# Patient Record
Sex: FEMALE | Race: ASIAN | NOT HISPANIC OR LATINO | Employment: FULL TIME | URBAN - METROPOLITAN AREA
[De-identification: names, ages, dates, MRNs, and addresses within clinical notes are randomized per-mention and may not be internally consistent; named-entity substitution may affect disease eponyms.]

---

## 2017-04-13 ENCOUNTER — HOSPITAL ENCOUNTER (OUTPATIENT)
Facility: MEDICAL CENTER | Age: 57
End: 2017-04-13
Attending: FAMILY MEDICINE
Payer: COMMERCIAL

## 2017-04-13 ENCOUNTER — HOSPITAL ENCOUNTER (OUTPATIENT)
Dept: LAB | Facility: MEDICAL CENTER | Age: 57
End: 2017-04-13
Payer: COMMERCIAL

## 2017-04-13 DIAGNOSIS — R73.01 IMPAIRED FASTING BLOOD SUGAR: ICD-10-CM

## 2017-04-13 DIAGNOSIS — I10 ESSENTIAL HYPERTENSION: ICD-10-CM

## 2017-04-13 DIAGNOSIS — D47.Z2 CASTLEMAN DISEASE (HCC): ICD-10-CM

## 2017-04-13 DIAGNOSIS — E78.5 DYSLIPIDEMIA: ICD-10-CM

## 2017-04-13 LAB
ALBUMIN SERPL BCP-MCNC: 4.9 G/DL (ref 3.2–4.9)
ALBUMIN/GLOB SERPL: 1.2 G/DL
ALP SERPL-CCNC: 139 U/L (ref 30–99)
ALT SERPL-CCNC: 22 U/L (ref 2–50)
ANION GAP SERPL CALC-SCNC: 6 MMOL/L (ref 0–11.9)
AST SERPL-CCNC: 22 U/L (ref 12–45)
BDY FAT % MEASURED: 31.6 %
BILIRUB SERPL-MCNC: 0.6 MG/DL (ref 0.1–1.5)
BP DIAS: 74 MMHG
BP SYS: 124 MMHG
BUN SERPL-MCNC: 11 MG/DL (ref 8–22)
CALCIUM SERPL-MCNC: 10.1 MG/DL (ref 8.5–10.5)
CHLORIDE SERPL-SCNC: 103 MMOL/L (ref 96–112)
CHOLEST SERPL-MCNC: 153 MG/DL (ref 100–199)
CHOLEST SERPL-MCNC: 154 MG/DL (ref 100–199)
CO2 SERPL-SCNC: 30 MMOL/L (ref 20–33)
CREAT SERPL-MCNC: 0.58 MG/DL (ref 0.5–1.4)
DIABETES HTDIA: NO
EST. AVERAGE GLUCOSE BLD GHB EST-MCNC: 117 MG/DL
EVENT NAME HTEVT: NORMAL
FASTING HTFAS: YES
GFR SERPL CREATININE-BSD FRML MDRD: >60 ML/MIN/1.73 M 2
GLOBULIN SER CALC-MCNC: 4 G/DL (ref 1.9–3.5)
GLUCOSE SERPL-MCNC: 105 MG/DL (ref 65–99)
GLUCOSE SERPL-MCNC: 105 MG/DL (ref 65–99)
HBA1C MFR BLD: 5.7 % (ref 0–5.6)
HDLC SERPL-MCNC: 55 MG/DL
HDLC SERPL-MCNC: 55 MG/DL
HYPERTENSION HTHYP: NO
LDLC SERPL CALC-MCNC: 72 MG/DL
LDLC SERPL CALC-MCNC: 73 MG/DL
POTASSIUM SERPL-SCNC: 4.5 MMOL/L (ref 3.6–5.5)
PROT SERPL-MCNC: 8.9 G/DL (ref 6–8.2)
SCREENING LOC CITY HTCIT: NORMAL
SCREENING LOC STATE HTSTA: NORMAL
SCREENING LOCATION HTLOC: NORMAL
SMOKING HTSMO: NO
SODIUM SERPL-SCNC: 139 MMOL/L (ref 135–145)
SUBSCRIBER ID HTSID: NORMAL
TRIGL SERPL-MCNC: 128 MG/DL (ref 0–149)
TRIGL SERPL-MCNC: 129 MG/DL (ref 0–149)

## 2017-04-13 PROCEDURE — 80061 LIPID PANEL: CPT | Mod: 91

## 2017-04-13 PROCEDURE — 80053 COMPREHEN METABOLIC PANEL: CPT

## 2017-04-13 PROCEDURE — 83036 HEMOGLOBIN GLYCOSYLATED A1C: CPT

## 2017-04-13 PROCEDURE — 80061 LIPID PANEL: CPT

## 2017-04-13 PROCEDURE — S5190 WELLNESS ASSESSMENT BY NONPH: HCPCS

## 2017-04-13 PROCEDURE — 36415 COLL VENOUS BLD VENIPUNCTURE: CPT

## 2017-04-13 PROCEDURE — 82947 ASSAY GLUCOSE BLOOD QUANT: CPT

## 2017-04-20 ENCOUNTER — OFFICE VISIT (OUTPATIENT)
Dept: MEDICAL GROUP | Facility: PHYSICIAN GROUP | Age: 57
End: 2017-04-20
Payer: COMMERCIAL

## 2017-04-20 VITALS
SYSTOLIC BLOOD PRESSURE: 132 MMHG | TEMPERATURE: 98.2 F | OXYGEN SATURATION: 97 % | DIASTOLIC BLOOD PRESSURE: 78 MMHG | HEART RATE: 73 BPM | WEIGHT: 117 LBS | HEIGHT: 61 IN | BODY MASS INDEX: 22.09 KG/M2 | RESPIRATION RATE: 14 BRPM

## 2017-04-20 DIAGNOSIS — D47.Z2 CASTLEMAN DISEASE (HCC): ICD-10-CM

## 2017-04-20 DIAGNOSIS — R73.01 IMPAIRED FASTING BLOOD SUGAR: ICD-10-CM

## 2017-04-20 DIAGNOSIS — I10 ESSENTIAL HYPERTENSION: ICD-10-CM

## 2017-04-20 DIAGNOSIS — E78.5 DYSLIPIDEMIA: ICD-10-CM

## 2017-04-20 PROCEDURE — 99214 OFFICE O/P EST MOD 30 MIN: CPT | Performed by: FAMILY MEDICINE

## 2017-04-20 RX ORDER — SIMVASTATIN 20 MG
TABLET ORAL
Qty: 90 TAB | Refills: 1 | Status: SHIPPED | OUTPATIENT
Start: 2017-04-20 | End: 2018-01-02 | Stop reason: SDUPTHER

## 2017-04-20 ASSESSMENT — PATIENT HEALTH QUESTIONNAIRE - PHQ9: CLINICAL INTERPRETATION OF PHQ2 SCORE: 0

## 2017-04-20 NOTE — MR AVS SNAPSHOT
"        Martha Apodacamaren   2017 1:00 PM   Office Visit   MRN: [id number]    Department:  [facility]   Dept Phone:  [phone]    Description:  Female : 1960   Provider:  Carol Juarez M.D.           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   [facility] (--)    [address]  [address]  [facility] [id number] [phone]           You will be receiving a confirmation call a few days before your appointment from our automated call confirmation system.              Problem List              ICD-10-CM Priority Class Noted - Resolved    HTN (hypertension) I10   Unknown - Present    Dyslipidemia E78.5   Unknown - Present    Impaired fasting blood sugar R73.01   2014 - Present    Dysarthria R47.1   Unknown - Present    Enlarged lymph nodes R59.9   2014 - Present    Castleman disease D47.Z2   2015 - Present    Essential hypertension I10   10/21/2015 - Present    Ataxia R27.0   10/21/2015 - Present      Health Maintenance        Date Due Completion Dates    MAMMOGRAM 2017, 2015, 2014, 2009, 2009, 2008, 2008    PAP SMEAR 2017    IMM DTaP/Tdap/Td Vaccine (2 - Td) 2024    COLONOSCOPY " 9/25/2024 9/25/2014            Current Immunizations     Hepatitis B Vaccine Non-Recombivax (Ped/Adol) 1/28/2000, 8/28/1999, 7/28/1999    Influenza TIV (IM) 4/22/2014  2:10 PM    Influenza Vaccine Quad Inj (Pf) 10/3/2016 10:10 AM, 10/5/2015 11:11 AM, 10/23/2014    Influenza Vaccine Quad Inj (Preserved) 10/3/2016, 10/5/2015    Tdap Vaccine 7/17/2014    Tuberculin Skin Test 4/30/2014 10:20 AM, 4/22/2014  2:10 PM      Below and/or attached are the medications your provider expects you to take. Review all of your home medications and newly ordered medications with your provider and/or pharmacist. Follow medication instructions as directed by your provider and/or pharmacist. Please keep your medication list with you and share with your provider. Update the information when medications are discontinued, doses are changed, or new medications (including over-the-counter products) are added; and carry medication information at all times in the event of emergency situations     Allergies:  No Known Allergies          Medications  Valid as of: April 20, 2017 -  1:19 PM    Generic Name Brand Name Tablet Size Instructions for use    Ascorbic Acid (Tab) ascorbic acid 500 MG Take 500 mg by mouth every day.        Aspirin (Chew Tab) ASA 81 MG Take 81 mg by mouth every day.        Cholecalciferol (Tab) cholecalciferol 1000 UNIT Take 2,000 Units by mouth every day.        NIFEdipine (TABLET SR 24 HR) ADALAT CC 60 MG TAKE ONE TABLET BY MOUTH ONCE DAILY        Omega-3 Fatty Acids (Cap) fish oil 1000 MG Take 1,000 mg by mouth 3 times a day, with meals.        Simvastatin (Tab) ZOCOR 20 MG TAKE ONE TABLET BY MOUTH ONCE DAILY IN THE EVENING        .                 Medicines prescribed today were sent to:     [facility] [address] [place] [address] [address]    Phone: [phone] Fax: [phone]    Open 24 Hours?: No      Medication refill instructions:       If your prescription  bottle indicates you have medication refills left, it is not necessary to call your provider’s office. Please contact your pharmacy and they will refill your medication.    If your prescription bottle indicates you do not have any refills left, you may request refills at any time through one of the following ways: The online [employer] system (except Urgent Care), by calling your provider’s office, or by asking your pharmacy to contact your provider’s office with a refill request. Medication refills are processed only during regular business hours and may not be available until the next business day. Your provider may request additional information or to have a follow-up visit with you prior to refilling your medication.   *Please Note: Medication refills are assigned a new Rx number when refilled electronically. Your pharmacy may indicate that no refills were authorized even though a new prescription for the same medication is available at the pharmacy. Please request the medicine by name with the pharmacy before contacting your provider for a refill.        Your To Do List     Future Labs/Procedures Complete By Expires    CBC WITH DIFFERENTIAL  As directed 4/21/2018    COMP METABOLIC PANEL  As directed 4/21/2018    LIPID PROFILE  As directed 4/21/2018         [employer] Access Code: [email or web address]  Expires: 5/20/2017 12:41 PM    [employer]  A secure, online tool to manage your health information     [employer]’s Mobile [employer]® is a secure, online tool that connects you to your personalized health information from the privacy of your home -- day or night - making it very easy for you to manage your healthcare. Once the activation process is completed, you can even access your medical information using the [employer]se justine, which is available for free in the Apple Justine store or Google Play store.     [employer] provides the following levels of access (as shown below):   My Chart Features   [facility] Primary Care Doctor  [facility]  Specialists [facility]  Urgent  Care Non-[facility]  Primary Care  Doctor   Email your healthcare team securely and privately 24/7 X X X    Manage appointments: schedule your next appointment; view details of past/upcoming appointments X      Request prescription refills. X      View recent personal medical records, including lab and immunizations X X X X   View health record, including health history, allergies, medications X X X X   Read reports about your outpatient visits, procedures, consult and ER notes X X X X   See your discharge summary, which is a recap of your hospital and/or ER visit that includes your diagnosis, lab results, and care plan. X X       How to register for [employer]  1. Go to  [email or web address].  2. Click on the Sign Up Now box, which takes you to the New Member Sign Up page. You will need to provide the following information:  a. Enter your [employer] Access Code exactly as it appears at the top of this page. (You will not need to use this code after you’ve completed the sign-up process. If you do not sign up before the expiration date, you must request a new code.)   b. Enter your date of birth.   c. Enter your home email address.   d. Click Submit, and follow the next screen’s instructions.  3. Create a [employer] ID. This will be your [employer] login ID and cannot be changed, so think of one that is secure and easy to remember.  4. Create a [employer] password. You can change your password at any time.  5. Enter your Password Reset Question and Answer. This can be used at a later time if you forget your password.   6. Enter your e-mail address. This allows you to receive e-mail notifications when new information is available in [employer]  7. Click Sign Up. You can now view your health information.    For assistance activating your [employer] account, call [phone]

## 2017-04-20 NOTE — PROGRESS NOTES
"Subjective:      Martha Oliver is a 57 y.o. female who presents with Follow-Up and Medication Refill            HPI     Patient returns for follow-up of her medical problems.    In terms of her shin, the blood pressure is better compared to last visit and this is now down to goal. She's had home blood pressure readings are in the 130s over 70s. She continues to take nifedipine.    For her dyslipidemia, she continues to take simvastatin. She denies any myalgias.    We continue to follow her for impaired fasting blood sugar. She manages this by watching her diet.    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 Her oncologist Dr. Dallas referred her to Dr. Ferguson, ENT specialist who recommended FNA. FNA was done in November 2016 neck and back no evidence of malignancy and consistent with reactive lymph node. She was supposed to have follow-up with her oncologist last December and will have a follow-up PET scan at that time. She has not been back to the oncologist.    Past medical history, past surgical history, family history reviewed-no changes    Social history reviewed-no changes    Allergies reviewed-no changes    Medications reviewed-no changes        ROS     Review of systems as per history of present illness, the rest are negative.       Objective:     /78 mmHg  Pulse 73  Temp(Src) 36.8 °C (98.2 °F)  Resp 14  Ht 1.549 m (5' 1\")  Wt 53.071 kg (117 lb)  BMI 22.12 kg/m2  SpO2 97%     Physical Exam     Examined alert, awake, oriented, not in distress    Neck-supple,  no thyromegaly, palpable enlarged lymph node 1 x 1 cm in size nontender left submandibular area  Lungs-clear to auscultation, no rales, no wheezes  Heart-regular rate and rhythm, no murmur  Extremities-no edema, clubbing, cyanosis     Hospital Outpatient Visit on 04/13/2017   Component Date Value   • " Hypertension? 04/13/2017 No    • Height 04/13/2017 61    • Weight 04/13/2017 112    • Body Fat % 04/13/2017 31.6    • Systolic BP 04/13/2017 124*   • Diastolic BP 04/13/2017 74    • Fasting? 04/13/2017 Yes    • Cholesterol,Tot 04/13/2017 154    • Triglycerides 04/13/2017 129    • HDL 04/13/2017 55    • LDL 04/13/2017 73    • Glucose 04/13/2017 105*   • Subscriber ID 04/13/2017 [facility]    • Event Name 04/13/2017 Patient Visit    • Location of Screening 04/13/2017 [place]    • City 04/13/2017 [place]    • State 04/13/2017 [place]    • Tobacco Use? 04/13/2017 No    • Diabetes? 04/13/2017 No    Hospital Outpatient Visit on 04/13/2017   Component Date Value   • Cholesterol,Tot 04/13/2017 153    • Triglycerides 04/13/2017 128    • HDL 04/13/2017 55    • LDL 04/13/2017 72    • Co2 04/13/2017 30    • Glucose 04/13/2017 105*   • Bun 04/13/2017 11    • Creatinine 04/13/2017 0.58    • Calcium 04/13/2017 10.1    • AST(SGOT) 04/13/2017 22    • ALT(SGPT) 04/13/2017 22    • Alkaline Phosphatase 04/13/2017 139*   • Total Bilirubin 04/13/2017 0.6    • Albumin 04/13/2017 4.9    • Total Protein 04/13/2017 8.9*   • Globulin 04/13/2017 4.0*   • A-G Ratio 04/13/2017 1.2    • Sodium 04/13/2017 139    • Potassium 04/13/2017 4.5    • Chloride 04/13/2017 103    • Anion Gap 04/13/2017 6.0    • Glycohemoglobin 04/13/2017 5.7*   • Est Avg Glucose 04/13/2017 117    • GFR If  04/13/2017 >60    • GFR If Non  [employer]meri* 04/13/2017 >60       Assessment/Plan:     1. Essential hypertension  Controlled on nifedipine.  - LIPID PROFILE; Future  - COMP METABOLIC PANEL; Future  - CBC WITH DIFFERENTIAL; Future  - NIFEdipine (ADALAT CC) 60 MG CR tablet; TAKE ONE TABLET BY MOUTH ONCE DAILY  Dispense: 90 Tab; Refill: 1    2. Dyslipidemia   All atTARGET on simvastatin.  - LIPID PROFILE; Future  - COMP METABOLIC PANEL; Future  - CBC WITH DIFFERENTIAL; Future  - simvastatin (ZOCOR) 20 MG Tab; TAKE ONE TABLET BY MOUTH ONCE DAILY IN THE EVENING  Dispense: 90  Tab; Refill: 1    3. Impaired fasting blood sugar  Fasting blood sugar is still borderline elevated but lower than before. She still has increased risk for diabetes so she will continue to avoid sweets and decrease the carbohydrates.  - LIPID PROFILE; Future  - COMP METABOLIC PANEL; Future  - CBC WITH DIFFERENTIAL; Future    4. Castleman disease  FNA of suspicious enlarged lymph node left side of the neck came back no evidence of malignancy and consistent with reactive lymph node. Advise the patient to call her oncologist office because she will need a follow-up PET scan. She was supposed to have this done last December.  - LIPID PROFILE; Future  - COMP METABOLIC PANEL; Future  - CBC WITH DIFFERENTIAL; Future    We will do a GYN exam/Pap smear when she returns in 6 months.    Please note that this dictation was created using voice recognition software. I have worked with consultants from the vendor as well as technical experts from [facility] to optimize the interface. I have made every reasonable attempt to correct obvious errors, but I expect that there are errors of grammar and possibly content I did not discover before finalizing the note.

## 2017-07-19 ENCOUNTER — HOSPITAL ENCOUNTER (OUTPATIENT)
Dept: RADIOLOGY | Facility: MEDICAL CENTER | Age: 57
End: 2017-07-19
Attending: FAMILY MEDICINE
Payer: COMMERCIAL

## 2017-07-19 ENCOUNTER — TELEPHONE (OUTPATIENT)
Dept: MEDICAL GROUP | Facility: PHYSICIAN GROUP | Age: 57
End: 2017-07-19

## 2017-07-19 DIAGNOSIS — Z12.31 VISIT FOR SCREENING MAMMOGRAM: ICD-10-CM

## 2017-07-19 PROCEDURE — 77063 BREAST TOMOSYNTHESIS BI: CPT

## 2017-07-19 NOTE — TELEPHONE ENCOUNTER
Phone Number Called: [phone] (home)     Message: LVM to call back.     Left Message for patient to call back: yes

## 2017-07-19 NOTE — TELEPHONE ENCOUNTER
----- Message from Carol Juarez M.D. sent at 7/19/2017  2:53 PM PDT -----  Mammogram within normal limits.

## 2017-09-15 ENCOUNTER — HOSPITAL ENCOUNTER (OUTPATIENT)
Facility: MEDICAL CENTER | Age: 57
End: 2017-09-15
Attending: PREVENTIVE MEDICINE
Payer: COMMERCIAL

## 2017-09-15 ENCOUNTER — EH NON-PROVIDER (OUTPATIENT)
Dept: OCCUPATIONAL MEDICINE | Facility: CLINIC | Age: 57
End: 2017-09-15

## 2017-09-15 DIAGNOSIS — Z29.89 NEED FOR ISOLATION: ICD-10-CM

## 2017-09-15 DIAGNOSIS — Z02.89 ENCOUNTER FOR OCCUPATIONAL HEALTH EXAMINATION: ICD-10-CM

## 2017-09-15 PROCEDURE — 94375 RESPIRATORY FLOW VOLUME LOOP: CPT

## 2017-09-15 PROCEDURE — 86480 TB TEST CELL IMMUN MEASURE: CPT | Performed by: PREVENTIVE MEDICINE

## 2017-09-19 LAB
M TB TUBERC IFN-G BLD QL: NEGATIVE
M TB TUBERC IFN-G/MITOGEN IGNF BLD: 0.02
M TB TUBERC IGNF/MITOGEN IGNF CONTROL: 10.57 [IU]/ML
MITOGEN IGNF BCKGRD COR BLD-ACNC: 0.05 [IU]/ML

## 2017-10-03 ENCOUNTER — IMMUNIZATION (OUTPATIENT)
Dept: OCCUPATIONAL MEDICINE | Facility: CLINIC | Age: 57
End: 2017-10-03

## 2017-10-03 DIAGNOSIS — Z23 NEED FOR VACCINATION: ICD-10-CM

## 2017-10-03 PROCEDURE — 90686 IIV4 VACC NO PRSV 0.5 ML IM: CPT | Performed by: PREVENTIVE MEDICINE

## 2017-10-17 ENCOUNTER — HOSPITAL ENCOUNTER (OUTPATIENT)
Dept: LAB | Facility: MEDICAL CENTER | Age: 57
End: 2017-10-17
Attending: FAMILY MEDICINE
Payer: COMMERCIAL

## 2017-10-17 DIAGNOSIS — D47.Z2 CASTLEMAN DISEASE (HCC): ICD-10-CM

## 2017-10-17 DIAGNOSIS — R73.01 IMPAIRED FASTING BLOOD SUGAR: ICD-10-CM

## 2017-10-17 DIAGNOSIS — E78.5 DYSLIPIDEMIA: ICD-10-CM

## 2017-10-17 DIAGNOSIS — I10 ESSENTIAL HYPERTENSION: ICD-10-CM

## 2017-10-17 LAB
ALBUMIN SERPL BCP-MCNC: 4.6 G/DL (ref 3.2–4.9)
ALBUMIN/GLOB SERPL: 1.2 G/DL
ALP SERPL-CCNC: 154 U/L (ref 30–99)
ALT SERPL-CCNC: 23 U/L (ref 2–50)
ANION GAP SERPL CALC-SCNC: 7 MMOL/L (ref 0–11.9)
AST SERPL-CCNC: 25 U/L (ref 12–45)
BASOPHILS # BLD AUTO: 0.7 % (ref 0–1.8)
BASOPHILS # BLD: 0.04 K/UL (ref 0–0.12)
BILIRUB SERPL-MCNC: 0.5 MG/DL (ref 0.1–1.5)
BUN SERPL-MCNC: 12 MG/DL (ref 8–22)
CALCIUM SERPL-MCNC: 10 MG/DL (ref 8.5–10.5)
CHLORIDE SERPL-SCNC: 101 MMOL/L (ref 96–112)
CHOLEST SERPL-MCNC: 146 MG/DL (ref 100–199)
CO2 SERPL-SCNC: 30 MMOL/L (ref 20–33)
CREAT SERPL-MCNC: 0.51 MG/DL (ref 0.5–1.4)
EOSINOPHIL # BLD AUTO: 0.26 K/UL (ref 0–0.51)
EOSINOPHIL NFR BLD: 4.8 % (ref 0–6.9)
ERYTHROCYTE [DISTWIDTH] IN BLOOD BY AUTOMATED COUNT: 43.5 FL (ref 35.9–50)
GFR SERPL CREATININE-BSD FRML MDRD: >60 ML/MIN/1.73 M 2
GLOBULIN SER CALC-MCNC: 3.7 G/DL (ref 1.9–3.5)
GLUCOSE SERPL-MCNC: 91 MG/DL (ref 65–99)
HCT VFR BLD AUTO: 44.1 % (ref 37–47)
HDLC SERPL-MCNC: 51 MG/DL
HGB BLD-MCNC: 14.7 G/DL (ref 12–16)
IMM GRANULOCYTES # BLD AUTO: 0.01 K/UL (ref 0–0.11)
IMM GRANULOCYTES NFR BLD AUTO: 0.2 % (ref 0–0.9)
LDLC SERPL CALC-MCNC: 66 MG/DL
LYMPHOCYTES # BLD AUTO: 1.9 K/UL (ref 1–4.8)
LYMPHOCYTES NFR BLD: 35.1 % (ref 22–41)
MCH RBC QN AUTO: 29.5 PG (ref 27–33)
MCHC RBC AUTO-ENTMCNC: 33.3 G/DL (ref 33.6–35)
MCV RBC AUTO: 88.6 FL (ref 81.4–97.8)
MONOCYTES # BLD AUTO: 0.53 K/UL (ref 0–0.85)
MONOCYTES NFR BLD AUTO: 9.8 % (ref 0–13.4)
NEUTROPHILS # BLD AUTO: 2.67 K/UL (ref 2–7.15)
NEUTROPHILS NFR BLD: 49.4 % (ref 44–72)
NRBC # BLD AUTO: 0 K/UL
NRBC BLD AUTO-RTO: 0 /100 WBC
PLATELET # BLD AUTO: 299 K/UL (ref 164–446)
PMV BLD AUTO: 10 FL (ref 9–12.9)
POTASSIUM SERPL-SCNC: 4.5 MMOL/L (ref 3.6–5.5)
PROT SERPL-MCNC: 8.3 G/DL (ref 6–8.2)
RBC # BLD AUTO: 4.98 M/UL (ref 4.2–5.4)
SODIUM SERPL-SCNC: 138 MMOL/L (ref 135–145)
TRIGL SERPL-MCNC: 143 MG/DL (ref 0–149)
WBC # BLD AUTO: 5.4 K/UL (ref 4.8–10.8)

## 2017-10-17 PROCEDURE — 80053 COMPREHEN METABOLIC PANEL: CPT

## 2017-10-17 PROCEDURE — 85025 COMPLETE CBC W/AUTO DIFF WBC: CPT

## 2017-10-17 PROCEDURE — 80061 LIPID PANEL: CPT

## 2017-10-17 PROCEDURE — 36415 COLL VENOUS BLD VENIPUNCTURE: CPT

## 2017-10-31 ENCOUNTER — TELEPHONE (OUTPATIENT)
Dept: MEDICAL GROUP | Facility: PHYSICIAN GROUP | Age: 57
End: 2017-10-31

## 2017-10-31 NOTE — TELEPHONE ENCOUNTER
ESTABLISHED PATIENT PRE-VISIT PLANNING     Note: Patient will not be contacted if there is no indication to call.     1.  Reviewed notes from the last few office visits within the medical group: Yes    2.  If any orders were placed at last visit or intended to be done for this visit (i.e. 6 mos follow-up), do we have Results/Consult Notes?        •  Labs - Labs ordered, completed on 10/17/17 and results are in chart.   Note: If patient appointment is for lab review and patient did not complete labs,                check with provider if OK to reschedule patient until labs completed.       •  Imaging - Imaging ordered, completed and results are in chart.       •  Referrals - No referrals were ordered at last office visit.    3. Is this appointment scheduled as a Hospital Follow-Up? No    4.  Immunizations were updated in [employer] using WebIZ?: Yes       •  Web [employer] Recommendations: HEPATITIS A , HEPATITIS B, MMR , TD and VARICELLA (Chicken Pox)     5.  Patient is due for the following Health Maintenance Topics:   Health Maintenance Due   Topic Date Due   • PAP SMEAR  07/17/2017       - Patient has completed FLU, HEPATITIS B and TDAP Immunization(s) per WebIZ. Chart has been updated.      6.  Patient was NOT informed to arrive 15 min prior to their scheduled appointment and bring in their medication bottles.

## 2017-11-01 ENCOUNTER — OFFICE VISIT (OUTPATIENT)
Dept: MEDICAL GROUP | Facility: PHYSICIAN GROUP | Age: 57
End: 2017-11-01
Payer: COMMERCIAL

## 2017-11-01 ENCOUNTER — HOSPITAL ENCOUNTER (OUTPATIENT)
Facility: MEDICAL CENTER | Age: 57
End: 2017-11-01
Attending: FAMILY MEDICINE
Payer: COMMERCIAL

## 2017-11-01 VITALS
WEIGHT: 116 LBS | DIASTOLIC BLOOD PRESSURE: 74 MMHG | SYSTOLIC BLOOD PRESSURE: 132 MMHG | OXYGEN SATURATION: 95 % | HEIGHT: 61 IN | BODY MASS INDEX: 21.9 KG/M2 | TEMPERATURE: 97.2 F | HEART RATE: 92 BPM | RESPIRATION RATE: 16 BRPM

## 2017-11-01 DIAGNOSIS — R04.0 EPISTAXIS: ICD-10-CM

## 2017-11-01 DIAGNOSIS — R73.01 IMPAIRED FASTING BLOOD SUGAR: ICD-10-CM

## 2017-11-01 DIAGNOSIS — Z11.51 SCREENING FOR HUMAN PAPILLOMAVIRUS (HPV): ICD-10-CM

## 2017-11-01 DIAGNOSIS — Z01.419 ENCOUNTER FOR ROUTINE GYNECOLOGICAL EXAMINATION WITH PAPANICOLAOU SMEAR OF CERVIX: ICD-10-CM

## 2017-11-01 DIAGNOSIS — E78.5 DYSLIPIDEMIA: ICD-10-CM

## 2017-11-01 DIAGNOSIS — I10 ESSENTIAL HYPERTENSION: ICD-10-CM

## 2017-11-01 DIAGNOSIS — D47.Z2 CASTLEMAN DISEASE (HCC): ICD-10-CM

## 2017-11-01 PROCEDURE — 88175 CYTOPATH C/V AUTO FLUID REDO: CPT

## 2017-11-01 PROCEDURE — 87624 HPV HI-RISK TYP POOLED RSLT: CPT

## 2017-11-01 PROCEDURE — 99000 SPECIMEN HANDLING OFFICE-LAB: CPT | Performed by: FAMILY MEDICINE

## 2017-11-01 PROCEDURE — 99396 PREV VISIT EST AGE 40-64: CPT | Performed by: FAMILY MEDICINE

## 2017-11-01 ASSESSMENT — PAIN SCALES - GENERAL: PAINLEVEL: NO PAIN

## 2017-11-02 ENCOUNTER — TELEPHONE (OUTPATIENT)
Dept: MEDICAL GROUP | Facility: PHYSICIAN GROUP | Age: 57
End: 2017-11-02

## 2017-11-02 LAB
CYTOLOGY REG CYTOL: NORMAL
HPV HR 12 DNA CVX QL NAA+PROBE: NEGATIVE
HPV16 DNA SPEC QL NAA+PROBE: NEGATIVE
HPV18 DNA SPEC QL NAA+PROBE: NEGATIVE
SPECIMEN SOURCE: NORMAL

## 2017-11-02 NOTE — PROGRESS NOTES
Subjective:      Martha Oliver is a 57 y.o. female who presents with Annual Exam            HPI     Patient is here for routine GYN exam/Pap smear. Her last Pap smear was in  and was normal. No history of abnormal Pap smears. No history of STDs. Patient is  3 para 3. She has been in menopause. Her last mammogram was in  and was within normal limits. She already had colonoscopy in 9/15. Flu shot was done on 10/3/17. Tdap was received in 2014.    In terms of her hypertension, this is under control on nifedipine.    For her dyslipidemia, she continues to take simvastatin. She denies myalgias.    We follow her for impaired fasting blood sugar. She manages this by watching her diet.    In terms of her couple months the disease, she has not been back to her oncologist with the last visit and 2016. She is supposed to have yearly PET scan for surveillance purposes. She has not felt any swollen glands or nodes.    She said she has been having nose bleeds from both nostrils 6 times already in the last 2 weeks. They resolve spontaneously with pressure.    I reviewed the following    Past Medical History:   Diagnosis Date   • Ataxia     no neurologic etiology found   • Dysarthria     no neurologic etiology found   • Dyslipidemia    • HTN (hypertension)    • Hypertension         Past Surgical History:   Procedure Laterality Date   • LYMPH NODE EXCISION  2014    Performed by Ирина Arcos M.D. at SURGERY SAME DAY [facility] ORS   • COLONOSCOPY      internal and external hemorrhoids   • TUBAL COAGULATION LAPAROSCOPIC BILATERAL         No Known Allergies    Current Outpatient Prescriptions   Medication Sig Dispense Refill   • simvastatin (ZOCOR) 20 MG Tab TAKE ONE TABLET BY MOUTH ONCE DAILY IN THE EVENING 90 Tab 1   • NIFEdipine (ADALAT CC) 60 MG CR tablet TAKE ONE TABLET BY MOUTH ONCE DAILY 90 Tab 1   • Omega-3 Fatty Acids (FISH OIL) 1000 MG CAPS capsule Take 1,000 mg by mouth 3 times a day,  "with meals.     • ascorbic acid (ASCORBIC ACID) 500 MG TABS Take 500 mg by mouth every day.     • vitamin D (CHOLECALCIFEROL) 1000 UNIT TABS Take 2,000 Units by mouth every day.     • aspirin (ASA) 81 MG CHEW Take 81 mg by mouth every day.       No current facility-administered medications for this visit.         Family History   Problem Relation Age of Onset   • Hypertension Mother    • Hyperlipidemia Mother    • Hypertension Sister    • Hyperlipidemia Sister    • Hypertension Sister    • Hyperlipidemia Sister        Social History     Social History   • Marital status:      Spouse name: N/A   • Number of children: 3   • Years of education: N/A     Occupational History   • sample handling [employer]     Social History Main Topics   • Smoking status: Never Smoker   • Smokeless tobacco: Never Used   • Alcohol use 0.0 oz/week      Comment: occasional   • Drug use: No   • Sexual activity: Yes     Partners: Male     Birth control/ protection: Surgical     Other Topics Concern   • Not on file     Social History Narrative   • No narrative on file        ROS     Review of systems as per history of present illness, the rest are negative.     Objective:     /74   Pulse 92   Temp 36.2 °C (97.2 °F)   Resp 16   Ht 1.549 m (5' 1\")   Wt 52.6 kg (116 lb)   SpO2 95%   Breastfeeding? No   BMI 21.92 kg/m²      Physical Exam   Constitutional: She is oriented to person, place, and time. She appears well-developed and well-nourished. No distress.   HENT:   Head: Normocephalic and atraumatic.   Right Ear: External ear normal.   Left Ear: External ear normal.   Nose: Nose normal.   Mouth/Throat: Oropharynx is clear and moist. No oropharyngeal exudate.   Eyes: Conjunctivae and EOM are normal. Pupils are equal, round, and reactive to light. Right eye exhibits no discharge. Left eye exhibits no discharge. No scleral icterus.   Neck: Normal range of motion. Neck supple. No tracheal deviation present. No thyromegaly " present.   Cardiovascular: Normal rate, regular rhythm and normal heart sounds.  Exam reveals no gallop.    No murmur heard.  Pulmonary/Chest: Effort normal and breath sounds normal. No stridor. No respiratory distress. She has no wheezes. She has no rales. Right breast exhibits no inverted nipple, no mass, no nipple discharge, no skin change and no tenderness. Left breast exhibits no inverted nipple, no mass, no nipple discharge, no skin change and no tenderness. Breasts are symmetrical.   Abdominal: Soft. Bowel sounds are normal. She exhibits no distension and no mass. There is no tenderness. There is no rebound and no guarding. Hernia confirmed negative in the right inguinal area and confirmed negative in the left inguinal area.   Genitourinary: Vagina normal and uterus normal. Rectal exam shows no internal hemorrhoid, no fissure, no mass, no tenderness, anal tone normal and guaiac negative stool. No breast tenderness, discharge or bleeding. No labial fusion. There is no rash, tenderness, lesion or injury on the right labia. There is no rash, tenderness, lesion or injury on the left labia. Uterus is not deviated, not enlarged, not fixed and not tender. Cervix exhibits no motion tenderness, no discharge and no friability. Right adnexum displays no mass, no tenderness and no fullness. Left adnexum displays no mass, no tenderness and no fullness. No erythema, tenderness or bleeding in the vagina. No foreign body in the vagina. No signs of injury around the vagina. No vaginal discharge found.   Musculoskeletal: Normal range of motion. She exhibits no edema or tenderness.   Lymphadenopathy:     She has no cervical adenopathy.        Right: No inguinal adenopathy present.        Left: No inguinal adenopathy present.   Neurological: She is alert and oriented to person, place, and time. She displays normal reflexes. No cranial nerve deficit. She exhibits normal muscle tone. Coordination normal.   Skin: Skin is warm. No  rash noted. She is not diaphoretic. No erythema. No pallor.   Psychiatric: She has a normal mood and affect. Her behavior is normal. Thought content normal.      Results for MARIA ANTONIA RICE (MRN [id number]) as of 11/2/2017 06:50   Ref. Range 7/28/2016 10:00 10/17/2017 10:04   WBC Latest Ref Range: 4.8 - 10.8 K/uL 5.5 5.4   RBC Latest Ref Range: 4.20 - 5.40 M/uL 4.89 4.98   Hemoglobin Latest Ref Range: 12.0 - 16.0 g/dL 14.3 14.7   Hematocrit Latest Ref Range: 37.0 - 47.0 % 43.0 44.1   MCV Latest Ref Range: 81.4 - 97.8 fL 87.9 88.6   MCH Latest Ref Range: 27.0 - 33.0 pg 29.2 29.5   MCHC Latest Ref Range: 33.6 - 35.0 g/dL 33.3 (L) 33.3 (L)   RDW Latest Ref Range: 35.9 - 50.0 fL 41.5 43.5   Platelet Count Latest Ref Range: 164 - 446 K/uL 276 299   MPV Latest Ref Range: 9.0 - 12.9 fL 10.2 10.0   Neutrophils-Polys Latest Ref Range: 44.00 - 72.00 % 52.90 49.40   Neutrophils (Absolute) Latest Ref Range: 2.00 - 7.15 K/uL 2.91 2.67   Lymphocytes Latest Ref Range: 22.00 - 41.00 % 33.10 35.10   Lymphs (Absolute) Latest Ref Range: 1.00 - 4.80 K/uL 1.82 1.90   Monocytes Latest Ref Range: 0.00 - 13.40 % 8.50 9.80   Monos (Absolute) Latest Ref Range: 0.00 - 0.85 K/uL 0.47 0.53   Eosinophils Latest Ref Range: 0.00 - 6.90 % 4.00 4.80   Eos (Absolute) Latest Ref Range: 0.00 - 0.51 K/uL 0.22 0.26   Basophils Latest Ref Range: 0.00 - 1.80 % 1.30 0.70   Baso (Absolute) Latest Ref Range: 0.00 - 0.12 K/uL 0.07 0.04   Immature Granulocytes Latest Ref Range: 0.00 - 0.90 % 0.20 0.20   Immature Granulocytes (abs) Latest Ref Range: 0.00 - 0.11 K/uL 0.01 0.01   Nucleated RBC Latest Units: /100 WBC 0.00 0.00   NRBC (Absolute) Latest Units: K/uL 0.00 0.00     Results for MARIA ANTONIA RICE (MRN [id number]) as of 11/2/2017 06:50   Ref. Range 4/13/2017 11:40 10/17/2017 10:04   Sodium Latest Ref Range: 135 - 145 mmol/L  138   Potassium Latest Ref Range: 3.6 - 5.5 mmol/L  4.5   Chloride Latest Ref Range: 96 - 112 mmol/L  101   Co2 Latest Ref Range: 20 - 33  mmol/L  30   Anion Gap Latest Ref Range: 0.0 - 11.9   7.0   Glucose Latest Ref Range: 65 - 99 mg/dL 105 (H) 91   Bun Latest Ref Range: 8 - 22 mg/dL  12   Creatinine Latest Ref Range: 0.50 - 1.40 mg/dL  0.51   GFR If  Latest Ref Range: >60 mL/min/1.73 m 2  >60   GFR If Non  Latest Ref Range: >60 mL/min/1.73 m 2  >60   Calcium Latest Ref Range: 8.5 - 10.5 mg/dL  10.0   AST(SGOT) Latest Ref Range: 12 - 45 U/L  25   ALT(SGPT) Latest Ref Range: 2 - 50 U/L  23   Alkaline Phosphatase Latest Ref Range: 30 - 99 U/L  154 (H)   Total Bilirubin Latest Ref Range: 0.1 - 1.5 mg/dL  0.5   Albumin Latest Ref Range: 3.2 - 4.9 g/dL  4.6   Total Protein Latest Ref Range: 6.0 - 8.2 g/dL  8.3 (H)   Globulin Latest Ref Range: 1.9 - 3.5 g/dL  3.7 (H)   A-G Ratio Latest Units: g/dL  1.2   Cholesterol,Tot Latest Ref Range: 100 - 199 mg/dL 154 146   Triglycerides Latest Ref Range: 0 - 149 mg/dL 129 143   HDL Latest Ref Range: >=40 mg/dL 55 51   LDL Latest Ref Range: <100 mg/dL 73 66               Assessment/Plan:     1. Encounter for routine gynecological examination with Papanicolaou smear of cervix  Complete exam was done. Pap smear was done. The specimen was packaged and sent to the lab. Up-to-date with immunizations, mammogram, colonoscopy.  - THINPREP PAP WITH HPV; Future    2. Screening for human papillomavirus (HPV) screening  Screening HPV was done.  - THINPREP PAP WITH HPV; Future    3. Essential hypertension  Controlled on her medication.  - LIPID PROFILE; Future  - COMP METABOLIC PANEL; Future  - HEMOGLOBIN A1C; Future    4. Dyslipidemia  All at target on simvastatin.  - LIPID PROFILE; Future  - COMP METABOLIC PANEL; Future  - HEMOGLOBIN A1C; Future    5. Impaired fasting blood sugar  Blood sugars normal at this time and she will continue to manage this by watching her diet.  - LIPID PROFILE; Future  - COMP METABOLIC PANEL; Future  - HEMOGLOBIN A1C; Future    6. Castleman disease (CMS-HCC)  CBC  within normal limits. She still has elevation of the alkaline phosphatase most likely from Castleman's disease. Advised to make an appointment for follow-up with her oncologist.    7. Epistaxis  CBC within normal limits per chest normal platelet count. Most likely from the dryness due to our dry weather. Advised humidifier. Advised over-the-counter nasal saline spray.      Please note that this dictation was created using voice recognition software. I have worked with consultants from the vendor as well as technical experts from [facility]  [employer] to optimize the interface. I have made every reasonable attempt to correct obvious errors, but I expect that there are errors of grammar and possibly content I did not discover before finalizing the note.

## 2017-11-03 NOTE — TELEPHONE ENCOUNTER
----- Message from Carol Juarez M.D. sent at 11/2/2017  5:57 PM PDT -----  Pap smear came back no evidence of malignancy. HPV test which is the test for the virus that can cause cervical cancer came back negative.

## 2018-02-28 ENCOUNTER — HOSPITAL ENCOUNTER (OUTPATIENT)
Dept: LAB | Facility: MEDICAL CENTER | Age: 58
End: 2018-02-28
Payer: COMMERCIAL

## 2018-02-28 LAB
BDY FAT % MEASURED: 32.8 %
BP DIAS: 77 MMHG
BP SYS: 137 MMHG
CHOLEST SERPL-MCNC: 142 MG/DL (ref 100–199)
DIABETES HTDIA: NO
EVENT NAME HTEVT: NORMAL
FASTING HTFAS: YES
GLUCOSE SERPL-MCNC: 97 MG/DL (ref 65–99)
HDLC SERPL-MCNC: 51 MG/DL
HYPERTENSION HTHYP: YES
LDLC SERPL CALC-MCNC: 68 MG/DL
SCREENING LOC CITY HTCIT: NORMAL
SCREENING LOC STATE HTSTA: NORMAL
SCREENING LOCATION HTLOC: NORMAL
SMOKING HTSMO: NO
SUBSCRIBER ID HTSID: NORMAL
TRIGL SERPL-MCNC: 115 MG/DL (ref 0–149)

## 2018-02-28 PROCEDURE — 36415 COLL VENOUS BLD VENIPUNCTURE: CPT

## 2018-02-28 PROCEDURE — 80061 LIPID PANEL: CPT

## 2018-02-28 PROCEDURE — S5190 WELLNESS ASSESSMENT BY NONPH: HCPCS

## 2018-02-28 PROCEDURE — 82947 ASSAY GLUCOSE BLOOD QUANT: CPT

## 2018-03-01 ENCOUNTER — TELEPHONE (OUTPATIENT)
Dept: MEDICAL GROUP | Facility: PHYSICIAN GROUP | Age: 58
End: 2018-03-01

## 2018-03-01 NOTE — TELEPHONE ENCOUNTER
----- Message from Carol Juarez M.D. sent at 3/1/2018  6:33 AM PST -----  [facility] blood work came back normal blood sugar and normal cholesterol panel.

## 2018-03-01 NOTE — TELEPHONE ENCOUNTER
VOICEMAIL  1. Caller Name: Martha Oliver                        Call Back Number: [phone] (home) [phone] (work)      2. Message: Called and left patient a message to call back to get lab results. LM     3. Patient approves office to leave a detailed voicemail/MyChart message: N\A

## 2018-04-25 ENCOUNTER — HOSPITAL ENCOUNTER (OUTPATIENT)
Dept: LAB | Facility: MEDICAL CENTER | Age: 58
End: 2018-04-25
Attending: FAMILY MEDICINE
Payer: COMMERCIAL

## 2018-04-25 DIAGNOSIS — I10 ESSENTIAL HYPERTENSION: ICD-10-CM

## 2018-04-25 DIAGNOSIS — E78.5 DYSLIPIDEMIA: ICD-10-CM

## 2018-04-25 DIAGNOSIS — R73.01 IMPAIRED FASTING BLOOD SUGAR: ICD-10-CM

## 2018-04-25 LAB
ALBUMIN SERPL BCP-MCNC: 4.7 G/DL (ref 3.2–4.9)
ALBUMIN/GLOB SERPL: 1.3 G/DL
ALP SERPL-CCNC: 156 U/L (ref 30–99)
ALT SERPL-CCNC: 25 U/L (ref 2–50)
ANION GAP SERPL CALC-SCNC: 9 MMOL/L (ref 0–11.9)
AST SERPL-CCNC: 23 U/L (ref 12–45)
BILIRUB SERPL-MCNC: 0.6 MG/DL (ref 0.1–1.5)
BUN SERPL-MCNC: 15 MG/DL (ref 8–22)
CALCIUM SERPL-MCNC: 9.7 MG/DL (ref 8.5–10.5)
CHLORIDE SERPL-SCNC: 105 MMOL/L (ref 96–112)
CHOLEST SERPL-MCNC: 135 MG/DL (ref 100–199)
CO2 SERPL-SCNC: 28 MMOL/L (ref 20–33)
CREAT SERPL-MCNC: 0.61 MG/DL (ref 0.5–1.4)
EST. AVERAGE GLUCOSE BLD GHB EST-MCNC: 134 MG/DL
GLOBULIN SER CALC-MCNC: 3.6 G/DL (ref 1.9–3.5)
GLUCOSE SERPL-MCNC: 99 MG/DL (ref 65–99)
HBA1C MFR BLD: 6.3 % (ref 0–5.6)
HDLC SERPL-MCNC: 57 MG/DL
LDLC SERPL CALC-MCNC: 64 MG/DL
POTASSIUM SERPL-SCNC: 3.7 MMOL/L (ref 3.6–5.5)
PROT SERPL-MCNC: 8.3 G/DL (ref 6–8.2)
SODIUM SERPL-SCNC: 142 MMOL/L (ref 135–145)
TRIGL SERPL-MCNC: 69 MG/DL (ref 0–149)

## 2018-04-25 PROCEDURE — 80053 COMPREHEN METABOLIC PANEL: CPT

## 2018-04-25 PROCEDURE — 36415 COLL VENOUS BLD VENIPUNCTURE: CPT

## 2018-04-25 PROCEDURE — 80061 LIPID PANEL: CPT

## 2018-04-25 PROCEDURE — 83036 HEMOGLOBIN GLYCOSYLATED A1C: CPT

## 2018-05-02 ENCOUNTER — OFFICE VISIT (OUTPATIENT)
Dept: MEDICAL GROUP | Facility: PHYSICIAN GROUP | Age: 58
End: 2018-05-02
Payer: COMMERCIAL

## 2018-05-02 VITALS
HEART RATE: 73 BPM | DIASTOLIC BLOOD PRESSURE: 80 MMHG | SYSTOLIC BLOOD PRESSURE: 130 MMHG | TEMPERATURE: 98.6 F | OXYGEN SATURATION: 97 % | WEIGHT: 118.61 LBS | BODY MASS INDEX: 22.39 KG/M2 | HEIGHT: 61 IN

## 2018-05-02 DIAGNOSIS — I10 ESSENTIAL HYPERTENSION: ICD-10-CM

## 2018-05-02 DIAGNOSIS — E78.5 DYSLIPIDEMIA: ICD-10-CM

## 2018-05-02 DIAGNOSIS — R73.01 IMPAIRED FASTING BLOOD SUGAR: ICD-10-CM

## 2018-05-02 DIAGNOSIS — D47.Z2 CASTLEMAN DISEASE (HCC): ICD-10-CM

## 2018-05-02 PROCEDURE — 99214 OFFICE O/P EST MOD 30 MIN: CPT | Performed by: FAMILY MEDICINE

## 2018-05-02 RX ORDER — SIMVASTATIN 20 MG
TABLET ORAL
Qty: 90 TAB | Refills: 1 | Status: SHIPPED | OUTPATIENT
Start: 2018-05-02 | End: 2018-11-29 | Stop reason: SDUPTHER

## 2018-05-02 ASSESSMENT — PATIENT HEALTH QUESTIONNAIRE - PHQ9: CLINICAL INTERPRETATION OF PHQ2 SCORE: 0

## 2018-05-02 NOTE — PROGRESS NOTES
"Subjective:      Martha Oliver is a 58 y.o. female who presents with Follow-Up; Referral Needed (Dr. Dallas); and Medication Refill (simvastatin)            HPI     Returns for follow-up of her medical problems.    Blood pressure is under control on nifedipine.    She continues to take simvastatin for dyslipidemia without myalgias. Blood work was done before this visit.    She continues to manage her impaired fasting blood sugar by watching her diet. She also did a follow-up blood work.    For Castleman's disease, she has not been back to see her oncologist Dr. Dallas's in September 2016. She was supposed to have a follow-up CAT scan for surveillance of her disease last July 2017 which she has not done. She has not felt any lymph nodes anywhere in her body.    Past medical history, past surgical history, family history reviewed-no changes    Social history reviewed-no changes    Allergies reviewed-no changes    Medications reviewed-no changes    ROS    As per history of present illness, the rest are negative.     Objective:     /80   Pulse 73   Temp 37 °C (98.6 °F)   Ht 1.549 m (5' 1\")   Wt 53.8 kg (118 lb 9.7 oz)   SpO2 97%   BMI 22.41 kg/m²      Physical Exam     Examined alert, awake, oriented, not in distress    Neck-supple, no lymphadenopathy, no thyromegaly  Lungs-clear to auscultation, no rales, no wheezes  Heart-regular rate and rhythm, no murmur  Extremities-no edema, clubbing, cyanosis       Results for MARTHA OLIVER (MRN [id number]) as of 5/2/2018 14:03   Ref. Range 2/28/2018 10:55 4/25/2018 10:24   Sodium Latest Ref Range: 135 - 145 mmol/L  142   Potassium Latest Ref Range: 3.6 - 5.5 mmol/L  3.7   Chloride Latest Ref Range: 96 - 112 mmol/L  105   Co2 Latest Ref Range: 20 - 33 mmol/L  28   Anion Gap Latest Ref Range: 0.0 - 11.9   9.0   Glucose Latest Ref Range: 65 - 99 mg/dL 97 99   Bun Latest Ref Range: 8 - 22 mg/dL  15   Creatinine Latest Ref Range: 0.50 - 1.40 mg/dL  0.61   GFR If African " American Latest Ref Range: >60 mL/min/1.73 m 2  >60   GFR If Non  Latest Ref Range: >60 mL/min/1.73 m 2  >60   Calcium Latest Ref Range: 8.5 - 10.5 mg/dL  9.7   AST(SGOT) Latest Ref Range: 12 - 45 U/L  23   ALT(SGPT) Latest Ref Range: 2 - 50 U/L  25   Alkaline Phosphatase Latest Ref Range: 30 - 99 U/L  156 (H)   Total Bilirubin Latest Ref Range: 0.1 - 1.5 mg/dL  0.6   Albumin Latest Ref Range: 3.2 - 4.9 g/dL  4.7   Total Protein Latest Ref Range: 6.0 - 8.2 g/dL  8.3 (H)   Globulin Latest Ref Range: 1.9 - 3.5 g/dL  3.6 (H)   A-G Ratio Latest Units: g/dL  1.3   Glycohemoglobin Latest Ref Range: 0.0 - 5.6 %  6.3 (H)   Estim. Avg Glu Latest Units: mg/dL  134   Cholesterol,Tot Latest Ref Range: 100 - 199 mg/dL 142 135   Triglycerides Latest Ref Range: 0 - 149 mg/dL 115 69   HDL Latest Ref Range: >=40 mg/dL 51 57   LDL Latest Ref Range: <100 mg/dL 68 64        Assessment/Plan:     1. Essential hypertension  Controlled on nifedipine.  - LIPID PROFILE; Future  - COMP METABOLIC PANEL; Future  - HEMOGLOBIN A1C; Future  - CBC WITH DIFFERENTIAL; Future    2. Dyslipidemia  All at target on simvastatin.  - LIPID PROFILE; Future  - COMP METABOLIC PANEL; Future  - HEMOGLOBIN A1C; Future  - CBC WITH DIFFERENTIAL; Future    3. Impaired fasting blood sugar  Fasting blood sugar is normal but hemoglobin A1c is slightly elevated putting her at risk for diabetes. She will continue to avoid sweets and decrease the carbohydrates. Advised regular exercises.  - LIPID PROFILE; Future  - COMP METABOLIC PANEL; Future  - HEMOGLOBIN A1C; Future  - CBC WITH DIFFERENTIAL; Future    4. Castleman disease (HCC)  New referral placed to Dr. Dallas, hematology/oncology for follow-up. Currently without symptoms and without any lymphadenopathy.  - REFERRAL TO HEMATOLOGY ONCOLOGY Referral to? [facility]      Please note that this dictation was created using voice recognition software. I have worked with consultants from the vendor  as well as technical experts from [facility] to optimize the interface. I have made every reasonable attempt to correct obvious errors, but I expect that there are errors of grammar and possibly content I did not discover before finalizing the note.

## 2018-07-06 ENCOUNTER — HOSPITAL ENCOUNTER (OUTPATIENT)
Dept: RADIOLOGY | Facility: MEDICAL CENTER | Age: 58
End: 2018-07-06
Attending: INTERNAL MEDICINE
Payer: COMMERCIAL

## 2018-07-06 DIAGNOSIS — D47.Z2 ANGIOLYMPHOID HYPERPLASIA (HCC): ICD-10-CM

## 2018-07-06 PROCEDURE — A9552 F18 FDG: HCPCS

## 2018-07-27 ENCOUNTER — HOSPITAL ENCOUNTER (OUTPATIENT)
Dept: RADIOLOGY | Facility: MEDICAL CENTER | Age: 58
End: 2018-07-27
Attending: FAMILY MEDICINE
Payer: COMMERCIAL

## 2018-07-27 ENCOUNTER — TELEPHONE (OUTPATIENT)
Dept: MEDICAL GROUP | Facility: PHYSICIAN GROUP | Age: 58
End: 2018-07-27

## 2018-07-27 DIAGNOSIS — Z12.31 VISIT FOR SCREENING MAMMOGRAM: ICD-10-CM

## 2018-07-27 PROCEDURE — 77067 SCR MAMMO BI INCL CAD: CPT

## 2018-07-27 NOTE — LETTER
August 8, 2018        Martha Apodacaud  [address] [place] [address]        Dear Martha:    Our office was unable to reach you by phone.  Please review your recent results below and contact us if you have any questions.        Sincerely,        Carol Juarez M.D.    Electronically Signed      ----- Message from Carol Juarez M.D. sent at 7/27/2018  4:37 PM PDT -----  Mammogram came back no evidence of malignancy.  Continue yearly screening mammogram.

## 2018-07-28 NOTE — TELEPHONE ENCOUNTER
----- Message from Carol Juarez M.D. sent at 7/27/2018  4:37 PM PDT -----  Mammogram came back no evidence of malignancy.  Continue yearly screening mammogram.

## 2018-08-26 ENCOUNTER — EH NON-PROVIDER (OUTPATIENT)
Dept: OCCUPATIONAL MEDICINE | Facility: CLINIC | Age: 58
End: 2018-08-26

## 2018-08-26 DIAGNOSIS — Z02.89 ENCOUNTER FOR OCCUPATIONAL HEALTH ASSESSMENT: ICD-10-CM

## 2018-08-29 ENCOUNTER — HOSPITAL ENCOUNTER (OUTPATIENT)
Facility: MEDICAL CENTER | Age: 58
End: 2018-08-29
Attending: PREVENTIVE MEDICINE
Payer: COMMERCIAL

## 2018-08-31 LAB
M TB TUBERC IFN-G BLD QL: NEGATIVE
M TB TUBERC IFN-G/MITOGEN IGNF BLD: -0
M TB TUBERC IGNF/MITOGEN IGNF CONTROL: 43.19 [IU]/ML
MITOGEN IGNF BCKGRD COR BLD-ACNC: 0.08 [IU]/ML

## 2018-09-02 ENCOUNTER — DOCUMENTATION (OUTPATIENT)
Dept: OCCUPATIONAL MEDICINE | Facility: CLINIC | Age: 58
End: 2018-09-02

## 2018-09-20 ENCOUNTER — EH NON-PROVIDER (OUTPATIENT)
Dept: OCCUPATIONAL MEDICINE | Facility: CLINIC | Age: 58
End: 2018-09-20

## 2018-09-20 DIAGNOSIS — Z02.89 ENCOUNTER FOR OCCUPATIONAL HEALTH EXAMINATION INVOLVING RESPIRATOR: Primary | ICD-10-CM

## 2018-09-20 PROCEDURE — 94375 RESPIRATORY FLOW VOLUME LOOP: CPT | Performed by: PREVENTIVE MEDICINE

## 2018-09-24 ENCOUNTER — IMMUNIZATION (OUTPATIENT)
Dept: OCCUPATIONAL MEDICINE | Facility: CLINIC | Age: 58
End: 2018-09-24

## 2018-09-24 DIAGNOSIS — Z23 NEED FOR VACCINATION: ICD-10-CM

## 2018-09-24 PROCEDURE — 90686 IIV4 VACC NO PRSV 0.5 ML IM: CPT | Performed by: PREVENTIVE MEDICINE

## 2018-11-16 ENCOUNTER — HOSPITAL ENCOUNTER (OUTPATIENT)
Dept: LAB | Facility: MEDICAL CENTER | Age: 58
End: 2018-11-16
Attending: FAMILY MEDICINE
Payer: COMMERCIAL

## 2018-11-16 DIAGNOSIS — I10 ESSENTIAL HYPERTENSION: ICD-10-CM

## 2018-11-16 DIAGNOSIS — R73.01 IMPAIRED FASTING BLOOD SUGAR: ICD-10-CM

## 2018-11-16 DIAGNOSIS — E78.5 DYSLIPIDEMIA: ICD-10-CM

## 2018-11-16 LAB
ALBUMIN SERPL BCP-MCNC: 4.8 G/DL (ref 3.2–4.9)
ALBUMIN/GLOB SERPL: 1.3 G/DL
ALP SERPL-CCNC: 143 U/L (ref 30–99)
ALT SERPL-CCNC: 26 U/L (ref 2–50)
ANION GAP SERPL CALC-SCNC: 7 MMOL/L (ref 0–11.9)
AST SERPL-CCNC: 25 U/L (ref 12–45)
BASOPHILS # BLD AUTO: 1.6 % (ref 0–1.8)
BASOPHILS # BLD: 0.09 K/UL (ref 0–0.12)
BILIRUB SERPL-MCNC: 0.5 MG/DL (ref 0.1–1.5)
BUN SERPL-MCNC: 12 MG/DL (ref 8–22)
CALCIUM SERPL-MCNC: 10.3 MG/DL (ref 8.5–10.5)
CHLORIDE SERPL-SCNC: 100 MMOL/L (ref 96–112)
CHOLEST SERPL-MCNC: 157 MG/DL (ref 100–199)
CO2 SERPL-SCNC: 31 MMOL/L (ref 20–33)
CREAT SERPL-MCNC: 0.58 MG/DL (ref 0.5–1.4)
EOSINOPHIL # BLD AUTO: 0.18 K/UL (ref 0–0.51)
EOSINOPHIL NFR BLD: 3.1 % (ref 0–6.9)
ERYTHROCYTE [DISTWIDTH] IN BLOOD BY AUTOMATED COUNT: 43.6 FL (ref 35.9–50)
EST. AVERAGE GLUCOSE BLD GHB EST-MCNC: 140 MG/DL
FASTING STATUS PATIENT QL REPORTED: NORMAL
GLOBULIN SER CALC-MCNC: 3.7 G/DL (ref 1.9–3.5)
GLUCOSE SERPL-MCNC: 101 MG/DL (ref 65–99)
HBA1C MFR BLD: 6.5 % (ref 0–5.6)
HCT VFR BLD AUTO: 44.4 % (ref 37–47)
HDLC SERPL-MCNC: 57 MG/DL
HGB BLD-MCNC: 15 G/DL (ref 12–16)
IMM GRANULOCYTES # BLD AUTO: 0.01 K/UL (ref 0–0.11)
IMM GRANULOCYTES NFR BLD AUTO: 0.2 % (ref 0–0.9)
LDLC SERPL CALC-MCNC: 73 MG/DL
LYMPHOCYTES # BLD AUTO: 1.63 K/UL (ref 1–4.8)
LYMPHOCYTES NFR BLD: 28.4 % (ref 22–41)
MCH RBC QN AUTO: 29.9 PG (ref 27–33)
MCHC RBC AUTO-ENTMCNC: 33.8 G/DL (ref 33.6–35)
MCV RBC AUTO: 88.6 FL (ref 81.4–97.8)
MONOCYTES # BLD AUTO: 0.55 K/UL (ref 0–0.85)
MONOCYTES NFR BLD AUTO: 9.6 % (ref 0–13.4)
NEUTROPHILS # BLD AUTO: 3.27 K/UL (ref 2–7.15)
NEUTROPHILS NFR BLD: 57.1 % (ref 44–72)
NRBC # BLD AUTO: 0 K/UL
NRBC BLD-RTO: 0 /100 WBC
PLATELET # BLD AUTO: 305 K/UL (ref 164–446)
PMV BLD AUTO: 10 FL (ref 9–12.9)
POTASSIUM SERPL-SCNC: 4 MMOL/L (ref 3.6–5.5)
PROT SERPL-MCNC: 8.5 G/DL (ref 6–8.2)
RBC # BLD AUTO: 5.01 M/UL (ref 4.2–5.4)
SODIUM SERPL-SCNC: 138 MMOL/L (ref 135–145)
TRIGL SERPL-MCNC: 137 MG/DL (ref 0–149)
WBC # BLD AUTO: 5.7 K/UL (ref 4.8–10.8)

## 2018-11-16 PROCEDURE — 85025 COMPLETE CBC W/AUTO DIFF WBC: CPT

## 2018-11-16 PROCEDURE — 80061 LIPID PANEL: CPT

## 2018-11-16 PROCEDURE — 83036 HEMOGLOBIN GLYCOSYLATED A1C: CPT

## 2018-11-16 PROCEDURE — 80053 COMPREHEN METABOLIC PANEL: CPT

## 2018-11-16 PROCEDURE — 36415 COLL VENOUS BLD VENIPUNCTURE: CPT

## 2018-11-29 ENCOUNTER — OFFICE VISIT (OUTPATIENT)
Dept: MEDICAL GROUP | Facility: PHYSICIAN GROUP | Age: 58
End: 2018-11-29
Payer: COMMERCIAL

## 2018-11-29 VITALS
DIASTOLIC BLOOD PRESSURE: 80 MMHG | HEIGHT: 61 IN | TEMPERATURE: 98.5 F | SYSTOLIC BLOOD PRESSURE: 146 MMHG | HEART RATE: 88 BPM | BODY MASS INDEX: 22.98 KG/M2 | OXYGEN SATURATION: 95 % | WEIGHT: 121.69 LBS

## 2018-11-29 DIAGNOSIS — I10 ESSENTIAL HYPERTENSION: ICD-10-CM

## 2018-11-29 DIAGNOSIS — E78.5 DYSLIPIDEMIA: ICD-10-CM

## 2018-11-29 DIAGNOSIS — D47.Z2 CASTLEMAN DISEASE (HCC): ICD-10-CM

## 2018-11-29 DIAGNOSIS — R73.01 IMPAIRED FASTING BLOOD SUGAR: ICD-10-CM

## 2018-11-29 PROCEDURE — 99214 OFFICE O/P EST MOD 30 MIN: CPT | Performed by: FAMILY MEDICINE

## 2018-11-29 RX ORDER — SIMVASTATIN 20 MG
TABLET ORAL
Qty: 90 TAB | Refills: 1 | Status: SHIPPED | OUTPATIENT
Start: 2018-11-29 | End: 2019-07-01 | Stop reason: SDUPTHER

## 2018-11-29 NOTE — PROGRESS NOTES
"Subjective:      Martha Oliver is a 58 y.o. female who presents with Hypertension        HPI:    Patient is being followed for hypertension for which she is on Nifedipine. She denies any side effects with this medication.  Today her blood pressure slightly elevated but previously under control.    Her cholesterol is controlled with Simvastatin without any side effects. Patient is feeling well overall. She does not report any associated symptoms or complaints at this time. No complaints of shortness of breath, chest pain, vision changes, or dizziness.    We follow her for impaired fasting blood sugar and she manages this by watching her diet.  Blood work was done before this visit.    Patient has history of Castleman's disease for which she follows with Dr. Dallas (hematology/oncology). She had a PET scan in July 2018 which showed new uptake of small lymph nodes in the neck and supraclavicular area. Rest of lymph nodes were stable. She states she has another CT evaluation scheduled on 12/28/18.  She denies any B symptoms.          Past medical history, past surgical history, family history reviewed-no changes    Social history reviewed-no changes    Allergies reviewed-no changes    Medications reviewed-no changes     ROS:  As per the HPI as shown above, the rest are negative.       Objective:     /80 (BP Location: Left arm, Patient Position: Sitting, BP Cuff Size: Adult)   Pulse 88   Temp 36.9 °C (98.5 °F) (Temporal)   Ht 1.549 m (5' 1\")   Wt 55.2 kg (121 lb 11.1 oz)   SpO2 95%   BMI 22.99 kg/m²     Physical Exam  Examined alert, awake, oriented, not in distress    Neck-supple, no lymphadenopathy, no thyromegaly  Lungs-clear to auscultation, no rales, no wheezes  Heart-regular rate and rhythm, no murmur  Extremities-no edema, clubbing, cyanosis      Labs:  Hospital Outpatient Visit on 11/16/2018   Component Date Value Ref Range Status   • Cholesterol,Tot 11/16/2018 157  100 - 199 mg/dL Final   • " Triglycerides 11/16/2018 137  0 - 149 mg/dL Final   • HDL 11/16/2018 57  >=40 mg/dL Final   • LDL 11/16/2018 73  <100 mg/dL Final   • Sodium 11/16/2018 138  135 - 145 mmol/L Final   • Potassium 11/16/2018 4.0  3.6 - 5.5 mmol/L Final   • Chloride 11/16/2018 100  96 - 112 mmol/L Final   • Co2 11/16/2018 31  20 - 33 mmol/L Final   • Anion Gap 11/16/2018 7.0  0.0 - 11.9 Final   • Glucose 11/16/2018 101* 65 - 99 mg/dL Final   • Bun 11/16/2018 12  8 - 22 mg/dL Final   • Creatinine 11/16/2018 0.58  0.50 - 1.40 mg/dL Final   • Calcium 11/16/2018 10.3  8.5 - 10.5 mg/dL Final   • AST(SGOT) 11/16/2018 25  12 - 45 U/L Final   • ALT(SGPT) 11/16/2018 26  2 - 50 U/L Final   • Alkaline Phosphatase 11/16/2018 143* 30 - 99 U/L Final   • Total Bilirubin 11/16/2018 0.5  0.1 - 1.5 mg/dL Final   • Albumin 11/16/2018 4.8  3.2 - 4.9 g/dL Final   • Total Protein 11/16/2018 8.5* 6.0 - 8.2 g/dL Final   • Globulin 11/16/2018 3.7* 1.9 - 3.5 g/dL Final   • A-G Ratio 11/16/2018 1.3  g/dL Final   • Glycohemoglobin 11/16/2018 6.5* 0.0 - 5.6 % Final    Comment: Increased risk for diabetes:  5.7 -6.4%  Diabetes:  >6.4%  Glycemic control for adults with diabetes:  <7.0%  The above interpretations are per ADA guidelines.  Diagnosis  of diabetes mellitus on the basis of elevated Hemoglobin A1c  should be confirmed by repeating the Hb A1c test.     • Est Avg Glucose 11/16/2018 140  mg/dL Final    Comment: The eAG calculation is based on the A1c-Derived Daily Glucose  (ADAG) study.  See the ADA's website for additional information.     • WBC 11/16/2018 5.7  4.8 - 10.8 K/uL Final   • RBC 11/16/2018 5.01  4.20 - 5.40 M/uL Final   • Hemoglobin 11/16/2018 15.0  12.0 - 16.0 g/dL Final   • Hematocrit 11/16/2018 44.4  37.0 - 47.0 % Final   • MCV 11/16/2018 88.6  81.4 - 97.8 fL Final   • MCH 11/16/2018 29.9  27.0 - 33.0 pg Final   • MCHC 11/16/2018 33.8  33.6 - 35.0 g/dL Final   • RDW 11/16/2018 43.6  35.9 - 50.0 fL Final   • Platelet Count 11/16/2018 305  164 -  446 K/uL Final   • MPV 11/16/2018 10.0  9.0 - 12.9 fL Final   • Neutrophils-Polys 11/16/2018 57.10  44.00 - 72.00 % Final   • Lymphocytes 11/16/2018 28.40  22.00 - 41.00 % Final   • Monocytes 11/16/2018 9.60  0.00 - 13.40 % Final   • Eosinophils 11/16/2018 3.10  0.00 - 6.90 % Final   • Basophils 11/16/2018 1.60  0.00 - 1.80 % Final   • Immature Granulocytes 11/16/2018 0.20  0.00 - 0.90 % Final   • Nucleated RBC 11/16/2018 0.00  /100 WBC Final   • Neutrophils (Absolute) 11/16/2018 3.27  2.00 - 7.15 K/uL Final    Includes immature neutrophils, if present.   • Lymphs (Absolute) 11/16/2018 1.63  1.00 - 4.80 K/uL Final   • Monos (Absolute) 11/16/2018 0.55  0.00 - 0.85 K/uL Final   • Eos (Absolute) 11/16/2018 0.18  0.00 - 0.51 K/uL Final   • Baso (Absolute) 11/16/2018 0.09  0.00 - 0.12 K/uL Final   • Immature Granulocytes (abs) 11/16/2018 0.01  0.00 - 0.11 K/uL Final   • NRBC (Absolute) 11/16/2018 0.00  K/uL Final   • Fasting Status 11/16/2018 Fasting   Final   • GFR If  11/16/2018 >60  >60 mL/min/1.73 m 2 Final   • GFR If Non  11/16/2018 >60  >60 mL/min/1.73 m 2 Final             Assessment/Plan:   1. Essential hypertension  -.  Blood pressure is slightly elevated this visit but previously under control.  We will keep her on the same dose of nifedipine but advised to monitor blood pressure at home and she will let me know if blood pressure runs 130/80 or higher consistently so we can adjust the dose.  - Refilling Nifedipine. Continue current plan of care.   - Labs ordered  - Lipid Profile; Future  - COMP METABOLIC PANEL; Future  - HEMOGLOBIN A1C; Future  - NIFEdipine (ADALAT CC) 60 MG CR tablet; TAKE 1 TABLET BY MOUTH ONCE DAILY  Dispense: 90 Tab; Refill: 1    2. Dyslipidemia  All at target on Simvastatin. Continue medication. Ordering labs.  - Lipid Profile; Future  - COMP METABOLIC PANEL; Future  - HEMOGLOBIN A1C; Future  - simvastatin (ZOCOR) 20 MG Tab; TAKE ONE TABLET BY MOUTH ONCE  DAILY IN THE EVENING  Dispense: 90 Tab; Refill: 1    3. Impaired fasting blood sugar  Fasting blood sugar borderline elevated but nondiabetic level.  Hemoglobin A1c however is already in the diabetic level at 6.5.  This is the first time that the hemoglobin A1c went up this high.  Advised to work harder on avoidance of sweets, decreasing the carbs, regular exercise and weight loss.  I will not label her as diabetic it unless we have to blood work results that is consistent with diabetes.  Recheck blood work next visit.  - Lipid Profile; Future  - COMP METABOLIC PANEL; Future  - HEMOGLOBIN A1C; Future    4. Castleman disease (HCC)  Continue follow-up with Dr. Dallas.  She has PET scan that she will due this coming December and we will follow along.  Currently asymptomatic.  - Lipid Profile; Future  - COMP METABOLIC PANEL; Future  - HEMOGLOBIN A1C; Future         I, Beck Ceja (Scribe), am scribing for, and in the presence of, Carol Juarez MD     Electronically signed by: Beck Ceja (Myrnaibe), 11/29/2018    I, Carol Juarez MD personally performed the services described in this documentation, as scribed by Beck Ceja in my presence, and it is both accurate and complete.

## 2018-12-28 ENCOUNTER — HOSPITAL ENCOUNTER (OUTPATIENT)
Dept: RADIOLOGY | Facility: MEDICAL CENTER | Age: 58
End: 2018-12-28
Attending: INTERNAL MEDICINE
Payer: COMMERCIAL

## 2018-12-28 DIAGNOSIS — D47.Z2 CASTLEMAN DISEASE (HCC): ICD-10-CM

## 2018-12-28 PROCEDURE — A9552 F18 FDG: HCPCS

## 2019-04-26 ENCOUNTER — HOSPITAL ENCOUNTER (OUTPATIENT)
Dept: LAB | Facility: MEDICAL CENTER | Age: 59
End: 2019-04-26
Payer: COMMERCIAL

## 2019-04-26 ENCOUNTER — HOSPITAL ENCOUNTER (OUTPATIENT)
Dept: LAB | Facility: MEDICAL CENTER | Age: 59
End: 2019-04-26
Attending: FAMILY MEDICINE
Payer: COMMERCIAL

## 2019-04-26 DIAGNOSIS — E78.5 DYSLIPIDEMIA: ICD-10-CM

## 2019-04-26 DIAGNOSIS — D47.Z2 CASTLEMAN DISEASE (HCC): ICD-10-CM

## 2019-04-26 DIAGNOSIS — R73.01 IMPAIRED FASTING BLOOD SUGAR: ICD-10-CM

## 2019-04-26 DIAGNOSIS — I10 ESSENTIAL HYPERTENSION: ICD-10-CM

## 2019-04-26 LAB
ALBUMIN SERPL BCP-MCNC: 4.8 G/DL (ref 3.2–4.9)
ALBUMIN/GLOB SERPL: 1.7 G/DL
ALP SERPL-CCNC: 108 U/L (ref 30–99)
ALT SERPL-CCNC: 25 U/L (ref 2–50)
ANION GAP SERPL CALC-SCNC: 8 MMOL/L (ref 0–11.9)
AST SERPL-CCNC: 21 U/L (ref 12–45)
BDY FAT % MEASURED: 29 %
BILIRUB SERPL-MCNC: 0.5 MG/DL (ref 0.1–1.5)
BP DIAS: 69 MMHG
BP SYS: 135 MMHG
BUN SERPL-MCNC: 15 MG/DL (ref 8–22)
CALCIUM SERPL-MCNC: 9.5 MG/DL (ref 8.5–10.5)
CHLORIDE SERPL-SCNC: 106 MMOL/L (ref 96–112)
CHOLEST SERPL-MCNC: 120 MG/DL (ref 100–199)
CHOLEST SERPL-MCNC: 121 MG/DL (ref 100–199)
CO2 SERPL-SCNC: 28 MMOL/L (ref 20–33)
CREAT SERPL-MCNC: 0.57 MG/DL (ref 0.5–1.4)
DIABETES HTDIA: NO
EST. AVERAGE GLUCOSE BLD GHB EST-MCNC: 137 MG/DL
EVENT NAME HTEVT: NORMAL
FASTING HTFAS: YES
FASTING STATUS PATIENT QL REPORTED: NORMAL
FASTING STATUS PATIENT QL REPORTED: NORMAL
GLOBULIN SER CALC-MCNC: 2.9 G/DL (ref 1.9–3.5)
GLUCOSE SERPL-MCNC: 101 MG/DL (ref 65–99)
GLUCOSE SERPL-MCNC: 103 MG/DL (ref 65–99)
HBA1C MFR BLD: 6.4 % (ref 0–5.6)
HDLC SERPL-MCNC: 48 MG/DL
HDLC SERPL-MCNC: 49 MG/DL
HYPERTENSION HTHYP: YES
LDLC SERPL CALC-MCNC: 54 MG/DL
LDLC SERPL CALC-MCNC: 55 MG/DL
POTASSIUM SERPL-SCNC: 3.6 MMOL/L (ref 3.6–5.5)
PROT SERPL-MCNC: 7.7 G/DL (ref 6–8.2)
SCREENING LOC CITY HTCIT: NORMAL
SCREENING LOC STATE HTSTA: NORMAL
SCREENING LOCATION HTLOC: NORMAL
SMOKING HTSMO: NO
SODIUM SERPL-SCNC: 142 MMOL/L (ref 135–145)
SUBSCRIBER ID HTSID: NORMAL
TRIGL SERPL-MCNC: 87 MG/DL (ref 0–149)
TRIGL SERPL-MCNC: 89 MG/DL (ref 0–149)

## 2019-04-26 PROCEDURE — 80061 LIPID PANEL: CPT | Mod: 91

## 2019-04-26 PROCEDURE — 82947 ASSAY GLUCOSE BLOOD QUANT: CPT

## 2019-04-26 PROCEDURE — 80053 COMPREHEN METABOLIC PANEL: CPT

## 2019-04-26 PROCEDURE — 36415 COLL VENOUS BLD VENIPUNCTURE: CPT

## 2019-04-26 PROCEDURE — S5190 WELLNESS ASSESSMENT BY NONPH: HCPCS

## 2019-04-26 PROCEDURE — 80061 LIPID PANEL: CPT

## 2019-04-26 PROCEDURE — 83036 HEMOGLOBIN GLYCOSYLATED A1C: CPT

## 2019-04-30 NOTE — PROGRESS NOTES
Offsite mask fit event   normal sinus rhythm, Normal axis, Normal OH interval and QRS complex. There are no acute ischemic ST or T-wave changes.

## 2019-05-13 ENCOUNTER — OFFICE VISIT (OUTPATIENT)
Dept: MEDICAL GROUP | Facility: PHYSICIAN GROUP | Age: 59
End: 2019-05-13
Payer: COMMERCIAL

## 2019-05-13 VITALS
SYSTOLIC BLOOD PRESSURE: 124 MMHG | WEIGHT: 111.77 LBS | TEMPERATURE: 98.9 F | HEIGHT: 61 IN | HEART RATE: 82 BPM | DIASTOLIC BLOOD PRESSURE: 60 MMHG | OXYGEN SATURATION: 95 % | BODY MASS INDEX: 21.1 KG/M2

## 2019-05-13 DIAGNOSIS — I10 ESSENTIAL HYPERTENSION: ICD-10-CM

## 2019-05-13 DIAGNOSIS — R73.01 IMPAIRED FASTING BLOOD SUGAR: ICD-10-CM

## 2019-05-13 DIAGNOSIS — E78.5 DYSLIPIDEMIA: ICD-10-CM

## 2019-05-13 DIAGNOSIS — D47.Z2 CASTLEMAN DISEASE (HCC): ICD-10-CM

## 2019-05-13 PROCEDURE — 99214 OFFICE O/P EST MOD 30 MIN: CPT | Performed by: FAMILY MEDICINE

## 2019-05-13 ASSESSMENT — PATIENT HEALTH QUESTIONNAIRE - PHQ9: CLINICAL INTERPRETATION OF PHQ2 SCORE: 0

## 2019-05-13 NOTE — PROGRESS NOTES
"Subjective:      Martha Oliver is a 59 y.o. female who presents with Hypertension and Medication Refill (simvastatin,ADALAT)        HPI:    Patient is here for follow-up of her medical problems.    Essential hypertension  Patient continues to take Nifedipine 60 mg without side effects. Her blood pressure is 124/60 in the office today. Her blood pressure has been running in the 120s-130s/70s-80s at home.    Dyslipidemia  She takes simvastatin 20 mg. She denies any myalgias.    Impaired fasting blood sugar  We follow her for impaired fasting blood sugar. She manages this by watching her diet. She has stopped eating rice and now eats cauliflower rice. She lost 10 pounds since November 2018.    Castleman disease  Patient has history of Castleman's disease diagnosed in 2014 with radiation treatments in 2015. This is followed by Dr. Dallas, Hematology. Her PET scan from 12/28/2018 demonstrated slight progression of disease in the neck with increase in size and number of several level 2 and level 3 nodes predominantly which demonstrate increased uptake. The normal-sized supraclavicular nodes no longer demonstrate uptake. Patient underwent biopsies of neck lymph node performed by Dr. Kirkpatrick, which were negative for any malignancy. Per patient, Dr. Dallas told her that she will not require any treatments or a repeat PET scan. She is scheduled for follow up in September of 2018, but intends to reschedule for October.    Past medical history, past surgical history, family history reviewed-no changes    Social history reviewed-no changes    Allergies reviewed-no changes    Medications reviewed-no changes    ROS:  As per the HPI as shown above, the rest are negative.       Objective:     /60 (BP Location: Left arm, Patient Position: Sitting, BP Cuff Size: Adult)   Pulse 82   Temp 37.2 °C (98.9 °F) (Temporal)   Ht 1.549 m (5' 1\")   Wt 50.7 kg (111 lb 12.4 oz)   SpO2 95%   BMI 21.12 kg/m²     Physical " Exam    Examined alert, awake, oriented, not in distress    Neck-supple, no lymphadenopathy, no thyromegaly  Lungs-clear to auscultation, no rales, no wheezes  Heart-regular rate and rhythm, no murmur  Extremities-no edema, clubbing, cyanosis     Labs:  No visits with results within 1 Week(s) from this visit.   Latest known visit with results is:   Hospital Outpatient Visit on 04/26/2019   Component Date Value Ref Range Status   • Cholesterol,Tot 04/26/2019 120  100 - 199 mg/dL Final   • Triglycerides 04/26/2019 87  0 - 149 mg/dL Final   • HDL 04/26/2019 48  >=40 mg/dL Final   • LDL 04/26/2019 55  <100 mg/dL Final   • Glucose 04/26/2019 103* 65 - 99 mg/dL Final   • Fasting? 04/26/2019 Yes   Final   • Hypertension? 04/26/2019 Yes   Final   • Height 04/26/2019 61  in Final   • Weight 04/26/2019 108  lbs Final   • Body Fat % 04/26/2019 29.0  % Final   • Systolic BP 04/26/2019 135* <120 mmHg Final   • Diastolic BP 04/26/2019 69  <80 mmHg Final   • Fasting Status 04/26/2019 Fasting   Final   • Subscriber ID 04/26/2019 [facility]   Final   • Event Name 04/26/2019 Patient Visit   Final   • Location of Screening 04/26/2019 [facility]   Final   • City 04/26/2019 [place]   Final   • State 04/26/2019 [place]   Final   • Tobacco Use? 04/26/2019 No   Final   • Diabetes? 04/26/2019 No   Final          Assessment/Plan:     1. Essential hypertension  Well controlled on current medications. Continue current regimen.  - Lipid Profile; Future  - Comp Metabolic Panel; Future  - HEMOGLOBIN A1C; Future  - CBC WITH DIFFERENTIAL; Future    2. Dyslipidemia  Lipid panel is all within normal limits. Continue same regimen.  - Lipid Profile; Future  - Comp Metabolic Panel; Future  - HEMOGLOBIN A1C; Future  - CBC WITH DIFFERENTIAL; Future    3. Impaired fasting blood sugar  Hemoglobin A1c is improved from 6.5 to 6.4 on most recent blood work. This is still  prediabetic range. I advised to make diet changes to improve the glucose levels. Advised to avoid sweets,  decrease the carbs, exercise regularly and keep a healthy weight.  - Lipid Profile; Future  - Comp Metabolic Panel; Future  - HEMOGLOBIN A1C; Future  - CBC WITH DIFFERENTIAL; Future    4. Castleman disease (HCC)  Stable. PET scan from 12/28/2018 demonstrated slight progression of disease in the neck with increase in size and number of several level 2 and level 3 nodes predominantly which demonstrate increased uptake.  Stable to slight improvement of right hemipelvis disease.  The normal-sized supraclavicular nodes no longer demonstrate uptake. The record provided is incomplete. I requested her full visit note from her last visit with Dr. Dallas.  - Lipid Profile; Future  - Comp Metabolic Panel; Future  - HEMOGLOBIN A1C; Future  - CBC WITH DIFFERENTIAL; Future    I, Franko Peterson (Scribe), am scribing for, and in the presence of, Carol Juarez MD     Electronically signed by: Franko Peterson (Scribe), 5/13/2019    I, Carol Juarez MD personally performed the services described in this documentation, as scribed by Franko Peterson in my presence, and it is both accurate and complete.

## 2019-05-13 NOTE — LETTER
[facility]  Carol Juarez M.D.  [address]  [place] [address]  Fax: [phone]   Authorization for Release/Disclosure of   Protected Health Information   Name: MARTHA OLIVER : 1960 SSN: [id number]   Address: [address] [place] [address]   [place] [address] Phone:    [phone] (home) [phone] (work)   I authorize the entity listed below to release/disclose the PHI below to:   [facility]/Carol Juarez M.D. and Carol Juarez M.D.   Provider or Entity Name:    Dr. Dallas-hematology/oncology   Address   City, [facility], [facility]   Phone:      Fax:     Reason for request: continuity of care   Information to be released:    [  ] LAST COLONOSCOPY,  including any PATH REPORT and follow-up  [  ] LAST FIT/COLOGUARD RESULT [  ] LAST DEXA  [  ] LAST MAMMOGRAM  [  ] LAST PAP  [  ] LAST LABS [  ] RETINA EXAM REPORT  [  ] IMMUNIZATION RECORDS  [  ] Release all info      [  ] Check here and initial the line next to each item to release ALL health information INCLUDING  _____ Care and treatment for drug and / or alcohol abuse  _____ HIV testing, infection status, or AIDS  _____ Genetic Testing    DATES OF SERVICE OR TIME PERIOD TO BE DISCLOSED: _____________  I understand and acknowledge that:  * This Authorization may be revoked at any time by you in writing, except if your health information has already been used or disclosed.  * Your health information that will be used or disclosed as a result of you signing this authorization could be re-disclosed by the recipient. If this occurs, your re-disclosed health information may no longer be protected by State or Federal laws.  * You may refuse to sign this Authorization. Your refusal will not affect your ability to obtain treatment.  * This Authorization becomes effective upon signing and will  on (date) __________.      If no date is indicated, this Authorization will  one (1) year from the signature date.    Name: Martha Oliver    Signature:   Date:      5/13/2019       PLEASE FAX REQUESTED RECORDS BACK TO: [phone]

## 2019-07-01 DIAGNOSIS — E78.5 DYSLIPIDEMIA: ICD-10-CM

## 2019-07-01 RX ORDER — SIMVASTATIN 20 MG
TABLET ORAL
Qty: 90 TAB | Refills: 0 | Status: SHIPPED | OUTPATIENT
Start: 2019-07-01 | End: 2019-10-01 | Stop reason: SDUPTHER

## 2019-08-21 ENCOUNTER — HOSPITAL ENCOUNTER (OUTPATIENT)
Dept: RADIOLOGY | Facility: MEDICAL CENTER | Age: 59
End: 2019-08-21
Attending: FAMILY MEDICINE
Payer: COMMERCIAL

## 2019-08-21 DIAGNOSIS — Z12.31 VISIT FOR SCREENING MAMMOGRAM: ICD-10-CM

## 2019-08-21 PROCEDURE — 77063 BREAST TOMOSYNTHESIS BI: CPT

## 2019-08-27 DIAGNOSIS — I10 ESSENTIAL HYPERTENSION: ICD-10-CM

## 2019-09-02 NOTE — PATIENT INSTRUCTIONS
Patient instructions given regarding labs, x-rays, medications, referral and followup appointment.   [phone]

## 2019-09-16 ENCOUNTER — EH NON-PROVIDER (OUTPATIENT)
Dept: OCCUPATIONAL MEDICINE | Facility: CLINIC | Age: 59
End: 2019-09-16

## 2019-09-16 ENCOUNTER — HOSPITAL ENCOUNTER (OUTPATIENT)
Facility: MEDICAL CENTER | Age: 59
End: 2019-09-16
Attending: PREVENTIVE MEDICINE
Payer: COMMERCIAL

## 2019-09-16 DIAGNOSIS — Z02.89 ENCOUNTER FOR OCCUPATIONAL HEALTH EXAMINATION: Primary | ICD-10-CM

## 2019-09-16 PROCEDURE — 86480 TB TEST CELL IMMUN MEASURE: CPT | Performed by: PREVENTIVE MEDICINE

## 2019-09-18 ENCOUNTER — DOCUMENTATION (OUTPATIENT)
Dept: OCCUPATIONAL MEDICINE | Facility: CLINIC | Age: 59
End: 2019-09-18

## 2019-09-18 LAB
GAMMA INTERFERON BACKGROUND BLD IA-ACNC: 0.05 IU/ML
M TB IFN-G BLD-IMP: NEGATIVE
M TB IFN-G CD4+ BCKGRND COR BLD-ACNC: 0.02 IU/ML
MITOGEN IGNF BCKGRD COR BLD-ACNC: >10 IU/ML
QFT TB2 - NIL TBQ2: 0.04 IU/ML

## 2019-09-25 ENCOUNTER — IMMUNIZATION (OUTPATIENT)
Dept: OCCUPATIONAL MEDICINE | Facility: CLINIC | Age: 59
End: 2019-09-25

## 2019-09-25 DIAGNOSIS — Z23 NEED FOR VACCINATION: ICD-10-CM

## 2019-10-06 PROCEDURE — 90686 IIV4 VACC NO PRSV 0.5 ML IM: CPT | Performed by: NURSE PRACTITIONER

## 2019-10-22 ENCOUNTER — EH NON-PROVIDER (OUTPATIENT)
Dept: OCCUPATIONAL MEDICINE | Facility: CLINIC | Age: 59
End: 2019-10-22

## 2019-10-22 DIAGNOSIS — Z02.89 ENCOUNTER FOR OCCUPATIONAL HEALTH EXAMINATION INVOLVING RESPIRATOR: ICD-10-CM

## 2019-10-22 PROCEDURE — 94375 RESPIRATORY FLOW VOLUME LOOP: CPT | Performed by: NURSE PRACTITIONER

## 2019-11-16 ENCOUNTER — HOSPITAL ENCOUNTER (OUTPATIENT)
Dept: LAB | Facility: MEDICAL CENTER | Age: 59
End: 2019-11-16
Attending: FAMILY MEDICINE
Payer: COMMERCIAL

## 2019-11-16 DIAGNOSIS — R73.01 IMPAIRED FASTING BLOOD SUGAR: ICD-10-CM

## 2019-11-16 DIAGNOSIS — I10 ESSENTIAL HYPERTENSION: ICD-10-CM

## 2019-11-16 DIAGNOSIS — D47.Z2 CASTLEMAN DISEASE (HCC): ICD-10-CM

## 2019-11-16 DIAGNOSIS — E78.5 DYSLIPIDEMIA: ICD-10-CM

## 2019-11-16 LAB
ALBUMIN SERPL BCP-MCNC: 5 G/DL (ref 3.2–4.9)
ALBUMIN/GLOB SERPL: 1.5 G/DL
ALP SERPL-CCNC: 118 U/L (ref 30–99)
ALT SERPL-CCNC: 27 U/L (ref 2–50)
ANION GAP SERPL CALC-SCNC: 8 MMOL/L (ref 0–11.9)
AST SERPL-CCNC: 23 U/L (ref 12–45)
BASOPHILS # BLD AUTO: 1.6 % (ref 0–1.8)
BASOPHILS # BLD: 0.07 K/UL (ref 0–0.12)
BILIRUB SERPL-MCNC: 0.5 MG/DL (ref 0.1–1.5)
BUN SERPL-MCNC: 15 MG/DL (ref 8–22)
CALCIUM SERPL-MCNC: 9.8 MG/DL (ref 8.5–10.5)
CHLORIDE SERPL-SCNC: 105 MMOL/L (ref 96–112)
CHOLEST SERPL-MCNC: 135 MG/DL (ref 100–199)
CO2 SERPL-SCNC: 29 MMOL/L (ref 20–33)
CREAT SERPL-MCNC: 0.58 MG/DL (ref 0.5–1.4)
EOSINOPHIL # BLD AUTO: 0.21 K/UL (ref 0–0.51)
EOSINOPHIL NFR BLD: 4.8 % (ref 0–6.9)
ERYTHROCYTE [DISTWIDTH] IN BLOOD BY AUTOMATED COUNT: 42.6 FL (ref 35.9–50)
EST. AVERAGE GLUCOSE BLD GHB EST-MCNC: 128 MG/DL
GLOBULIN SER CALC-MCNC: 3.4 G/DL (ref 1.9–3.5)
GLUCOSE SERPL-MCNC: 104 MG/DL (ref 65–99)
HBA1C MFR BLD: 6.1 % (ref 0–5.6)
HCT VFR BLD AUTO: 43.9 % (ref 37–47)
HDLC SERPL-MCNC: 56 MG/DL
HGB BLD-MCNC: 14.6 G/DL (ref 12–16)
IMM GRANULOCYTES # BLD AUTO: 0.01 K/UL (ref 0–0.11)
IMM GRANULOCYTES NFR BLD AUTO: 0.2 % (ref 0–0.9)
LDLC SERPL CALC-MCNC: 64 MG/DL
LYMPHOCYTES # BLD AUTO: 1.45 K/UL (ref 1–4.8)
LYMPHOCYTES NFR BLD: 32.8 % (ref 22–41)
MCH RBC QN AUTO: 29.3 PG (ref 27–33)
MCHC RBC AUTO-ENTMCNC: 33.3 G/DL (ref 33.6–35)
MCV RBC AUTO: 88.2 FL (ref 81.4–97.8)
MONOCYTES # BLD AUTO: 0.45 K/UL (ref 0–0.85)
MONOCYTES NFR BLD AUTO: 10.2 % (ref 0–13.4)
NEUTROPHILS # BLD AUTO: 2.23 K/UL (ref 2–7.15)
NEUTROPHILS NFR BLD: 50.4 % (ref 44–72)
NRBC # BLD AUTO: 0 K/UL
NRBC BLD-RTO: 0 /100 WBC
PLATELET # BLD AUTO: 272 K/UL (ref 164–446)
PMV BLD AUTO: 10.2 FL (ref 9–12.9)
POTASSIUM SERPL-SCNC: 4 MMOL/L (ref 3.6–5.5)
PROT SERPL-MCNC: 8.4 G/DL (ref 6–8.2)
RBC # BLD AUTO: 4.98 M/UL (ref 4.2–5.4)
SODIUM SERPL-SCNC: 142 MMOL/L (ref 135–145)
TRIGL SERPL-MCNC: 74 MG/DL (ref 0–149)
WBC # BLD AUTO: 4.4 K/UL (ref 4.8–10.8)

## 2019-11-16 PROCEDURE — 36415 COLL VENOUS BLD VENIPUNCTURE: CPT

## 2019-11-16 PROCEDURE — 85025 COMPLETE CBC W/AUTO DIFF WBC: CPT

## 2019-11-16 PROCEDURE — 80061 LIPID PANEL: CPT

## 2019-11-16 PROCEDURE — 80053 COMPREHEN METABOLIC PANEL: CPT

## 2019-11-16 PROCEDURE — 83036 HEMOGLOBIN GLYCOSYLATED A1C: CPT

## 2019-11-25 ENCOUNTER — OFFICE VISIT (OUTPATIENT)
Dept: MEDICAL GROUP | Facility: PHYSICIAN GROUP | Age: 59
End: 2019-11-25
Payer: COMMERCIAL

## 2019-11-25 ENCOUNTER — HOSPITAL ENCOUNTER (OUTPATIENT)
Dept: RADIOLOGY | Facility: MEDICAL CENTER | Age: 59
End: 2019-11-25
Attending: INTERNAL MEDICINE
Payer: COMMERCIAL

## 2019-11-25 VITALS
HEIGHT: 61 IN | TEMPERATURE: 98.2 F | DIASTOLIC BLOOD PRESSURE: 70 MMHG | WEIGHT: 106.26 LBS | OXYGEN SATURATION: 95 % | BODY MASS INDEX: 20.06 KG/M2 | SYSTOLIC BLOOD PRESSURE: 120 MMHG | HEART RATE: 80 BPM

## 2019-11-25 DIAGNOSIS — Z11.59 NEED FOR HEPATITIS C SCREENING TEST: ICD-10-CM

## 2019-11-25 DIAGNOSIS — E78.5 DYSLIPIDEMIA: ICD-10-CM

## 2019-11-25 DIAGNOSIS — I10 ESSENTIAL HYPERTENSION: ICD-10-CM

## 2019-11-25 DIAGNOSIS — R73.01 IMPAIRED FASTING BLOOD SUGAR: ICD-10-CM

## 2019-11-25 DIAGNOSIS — D47.Z2 CASTLEMAN'S DISEASE (HCC): ICD-10-CM

## 2019-11-25 DIAGNOSIS — D47.Z2 CASTLEMAN DISEASE (HCC): ICD-10-CM

## 2019-11-25 PROCEDURE — A9552 F18 FDG: HCPCS

## 2019-11-25 PROCEDURE — 99214 OFFICE O/P EST MOD 30 MIN: CPT | Performed by: FAMILY MEDICINE

## 2019-11-25 NOTE — PROGRESS NOTES
"Subjective:      Martha Oliver is a 59 y.o. female who presents with Hypertension and Medication Refill (adalat, simvastatin)        HPI:    The patient is here for follow up on her chronic medical problems and medication refill.    Essential Hypertension  She takes Nifedipine 60 mg for her hypertension without any side effects. Blood pressure in the office today is within goal at 120/70.     Dyslipidemia  She has been taking Simvastatin 20 mg as prescribed for her dyslipidemia without myalgias or other side effects. Blood work was done before this visit.    Impaired fasting blood sugar  She continues to manage this through her own efforts.    Castleman disease  Patient has a history of Castleman's disease. She was diagnosed in 2014 and underwent radiation treatments in 2015. Patient had a PET scan done earlier this morning which showed slight improvement lymph nodes in her neck and right groin with decreased FDG uptake.  Additionally she has follow up with Dr. Dallas (hematology) on 12/12/19. Denies any new lumps or swollen glands.    Past medical history, past surgical history, family history reviewed-no changes    Social history reviewed-no changes    Allergies reviewed-no changes    Medications reviewed-no changes    ROS:  As per the HPI as shown above, the rest are negative.       Objective:     /70 (BP Location: Left arm, Patient Position: Sitting, BP Cuff Size: Adult)   Pulse 80   Temp 36.8 °C (98.2 °F) (Temporal)   Ht 1.549 m (5' 1\")   Wt 48.2 kg (106 lb 4.2 oz)   SpO2 95%   BMI 20.08 kg/m²     Physical Exam  Examined alert, awake, oriented, not in distress    Neck-supple, no lymphadenopathy, no thyromegaly  Lungs-clear to auscultation, no rales, no wheezes  Heart-regular rate and rhythm, no murmur  Extremities-no edema, clubbing, cyanosis     Labs:  Hospital Outpatient Visit on 11/16/2019   Component Date Value Ref Range Status   • WBC 11/16/2019 4.4* 4.8 - 10.8 K/uL Final   • RBC 11/16/2019 " 4.98  4.20 - 5.40 M/uL Final   • Hemoglobin 11/16/2019 14.6  12.0 - 16.0 g/dL Final   • Hematocrit 11/16/2019 43.9  37.0 - 47.0 % Final   • MCV 11/16/2019 88.2  81.4 - 97.8 fL Final   • MCH 11/16/2019 29.3  27.0 - 33.0 pg Final   • MCHC 11/16/2019 33.3* 33.6 - 35.0 g/dL Final   • RDW 11/16/2019 42.6  35.9 - 50.0 fL Final   • Platelet Count 11/16/2019 272  164 - 446 K/uL Final   • MPV 11/16/2019 10.2  9.0 - 12.9 fL Final   • Neutrophils-Polys 11/16/2019 50.40  44.00 - 72.00 % Final   • Lymphocytes 11/16/2019 32.80  22.00 - 41.00 % Final   • Monocytes 11/16/2019 10.20  0.00 - 13.40 % Final   • Eosinophils 11/16/2019 4.80  0.00 - 6.90 % Final   • Basophils 11/16/2019 1.60  0.00 - 1.80 % Final   • Immature Granulocytes 11/16/2019 0.20  0.00 - 0.90 % Final   • Nucleated RBC 11/16/2019 0.00  /100 WBC Final   • Neutrophils (Absolute) 11/16/2019 2.23  2.00 - 7.15 K/uL Final    Includes immature neutrophils, if present.   • Lymphs (Absolute) 11/16/2019 1.45  1.00 - 4.80 K/uL Final   • Monos (Absolute) 11/16/2019 0.45  0.00 - 0.85 K/uL Final   • Eos (Absolute) 11/16/2019 0.21  0.00 - 0.51 K/uL Final   • Baso (Absolute) 11/16/2019 0.07  0.00 - 0.12 K/uL Final   • Immature Granulocytes (abs) 11/16/2019 0.01  0.00 - 0.11 K/uL Final   • NRBC (Absolute) 11/16/2019 0.00  K/uL Final   • Glycohemoglobin 11/16/2019 6.1* 0.0 - 5.6 % Final    Comment: Increased risk for diabetes:  5.7 -6.4%  Diabetes:  >6.4%  Glycemic control for adults with diabetes:  <7.0%  The above interpretations are per ADA guidelines.  Diagnosis  of diabetes mellitus on the basis of elevated Hemoglobin A1c  should be confirmed by repeating the Hb A1c test.     • Est Avg Glucose 11/16/2019 128  mg/dL Final    Comment: The eAG calculation is based on the A1c-Derived Daily Glucose  (ADAG) study.  See the ADA's website for additional information.     • Sodium 11/16/2019 142  135 - 145 mmol/L Final   • Potassium 11/16/2019 4.0  3.6 - 5.5 mmol/L Final   • Chloride  11/16/2019 105  96 - 112 mmol/L Final   • Co2 11/16/2019 29  20 - 33 mmol/L Final   • Anion Gap 11/16/2019 8.0  0.0 - 11.9 Final   • Glucose 11/16/2019 104* 65 - 99 mg/dL Final   • Bun 11/16/2019 15  8 - 22 mg/dL Final   • Creatinine 11/16/2019 0.58  0.50 - 1.40 mg/dL Final   • Calcium 11/16/2019 9.8  8.5 - 10.5 mg/dL Final   • AST(SGOT) 11/16/2019 23  12 - 45 U/L Final   • ALT(SGPT) 11/16/2019 27  2 - 50 U/L Final   • Alkaline Phosphatase 11/16/2019 118* 30 - 99 U/L Final   • Total Bilirubin 11/16/2019 0.5  0.1 - 1.5 mg/dL Final   • Albumin 11/16/2019 5.0* 3.2 - 4.9 g/dL Final   • Total Protein 11/16/2019 8.4* 6.0 - 8.2 g/dL Final   • Globulin 11/16/2019 3.4  1.9 - 3.5 g/dL Final   • A-G Ratio 11/16/2019 1.5  g/dL Final   • Cholesterol,Tot 11/16/2019 135  100 - 199 mg/dL Final   • Triglycerides 11/16/2019 74  0 - 149 mg/dL Final   • HDL 11/16/2019 56  >=40 mg/dL Final   • LDL 11/16/2019 64  <100 mg/dL Final   • GFR If  11/16/2019 >60  >60 mL/min/1.73 m 2 Final   • GFR If Non  11/16/2019 >60  >60 mL/min/1.73 m 2 Final          Assessment/Plan:     1. Essential hypertension  Blood pressure is stable and within goal on current medications. We will continue the current dosages. I have advised her to avoid salty foods and exercise regularly.  - Lipid Profile; Future  - Comp Metabolic Panel; Future  - HEMOGLOBIN A1C; Future  - HEP C VIRUS ANTIBODY; Future  - CBC WITH DIFFERENTIAL; Future    2. Dyslipidemia  Her lipid panel values were all within goal. Stable on current medication dosage. I have advised the patient to increase her exercise regimen and avoid fatty foods. Labs have been ordered for the next follow up visit.   - Lipid Profile; Future  - Comp Metabolic Panel; Future  - HEMOGLOBIN A1C; Future  - HEP C VIRUS ANTIBODY; Future  - CBC WITH DIFFERENTIAL; Future    3. Impaired fasting blood sugar  This continues to be at prediabetic level with hemoglobin A1c of 6.1.  She will continue  to manage this with avoidance of sweets, decreasing the carbs, regular exercise and keeping a healthy weight.  - Lipid Profile; Future  - Comp Metabolic Panel; Future  - HEMOGLOBIN A1C; Future  - HEP C VIRUS ANTIBODY; Future  - CBC WITH DIFFERENTIAL; Future    4. Castleman disease (HCC)  I discussed the results of her PET scan this morning which showed slight improvement of the lymph nodes in the neck and the right inguinal area with decrease in the FDG uptake. Additionally I advised the Dr. Dallas will go more into depth with her on these results at her follow up on December.   - Lipid Profile; Future  - Comp Metabolic Panel; Future  - HEMOGLOBIN A1C; Future  - HEP C VIRUS ANTIBODY; Future  - CBC WITH DIFFERENTIAL; Future    5. Need for hepatitis C screening test  Because the patient was born between 1943 and 1963 the CDC recommends a one time hepatitis C screening. The patient agrees to have this completed.  - Lipid Profile; Future  - Comp Metabolic Panel; Future  - HEMOGLOBIN A1C; Future  - HEP C VIRUS ANTIBODY; Future  - CBC WITH DIFFERENTIAL; Future      IMinda (Too), am scribing for, and in the presence of, Carol Juarez MD     Electronically signed by: Minda Jeffries (Too), 11/25/2019    I, Carol Juarez MD personally performed the services described in this documentation, as scribed by Minda Jeffries in my presence, and it is both accurate and complete.

## 2020-01-22 ENCOUNTER — OFFICE VISIT (OUTPATIENT)
Dept: MEDICAL GROUP | Facility: PHYSICIAN GROUP | Age: 60
End: 2020-01-22
Payer: COMMERCIAL

## 2020-01-22 ENCOUNTER — HOSPITAL ENCOUNTER (OUTPATIENT)
Dept: LAB | Facility: MEDICAL CENTER | Age: 60
End: 2020-01-22
Attending: FAMILY MEDICINE
Payer: COMMERCIAL

## 2020-01-22 VITALS
BODY MASS INDEX: 20.81 KG/M2 | WEIGHT: 110.23 LBS | TEMPERATURE: 97.7 F | OXYGEN SATURATION: 98 % | DIASTOLIC BLOOD PRESSURE: 70 MMHG | HEIGHT: 61 IN | SYSTOLIC BLOOD PRESSURE: 120 MMHG | HEART RATE: 89 BPM

## 2020-01-22 DIAGNOSIS — R04.0 RECURRENT EPISTAXIS: ICD-10-CM

## 2020-01-22 DIAGNOSIS — R04.0 EPISTAXIS: ICD-10-CM

## 2020-01-22 LAB
ALBUMIN SERPL BCP-MCNC: 4.6 G/DL (ref 3.2–4.9)
ALBUMIN/GLOB SERPL: 1.3 G/DL
ALP SERPL-CCNC: 116 U/L (ref 30–99)
ALT SERPL-CCNC: 26 U/L (ref 2–50)
ANION GAP SERPL CALC-SCNC: 9 MMOL/L (ref 0–11.9)
APTT PPP: 31.6 SEC (ref 24.7–36)
AST SERPL-CCNC: 24 U/L (ref 12–45)
BASOPHILS # BLD AUTO: 0.9 % (ref 0–1.8)
BASOPHILS # BLD: 0.04 K/UL (ref 0–0.12)
BILIRUB SERPL-MCNC: 0.3 MG/DL (ref 0.1–1.5)
BUN SERPL-MCNC: 11 MG/DL (ref 8–22)
CALCIUM SERPL-MCNC: 9.8 MG/DL (ref 8.5–10.5)
CHLORIDE SERPL-SCNC: 104 MMOL/L (ref 96–112)
CO2 SERPL-SCNC: 29 MMOL/L (ref 20–33)
CREAT SERPL-MCNC: 0.62 MG/DL (ref 0.5–1.4)
EOSINOPHIL # BLD AUTO: 0.09 K/UL (ref 0–0.51)
EOSINOPHIL NFR BLD: 2 % (ref 0–6.9)
ERYTHROCYTE [DISTWIDTH] IN BLOOD BY AUTOMATED COUNT: 46.2 FL (ref 35.9–50)
GLOBULIN SER CALC-MCNC: 3.5 G/DL (ref 1.9–3.5)
GLUCOSE SERPL-MCNC: 99 MG/DL (ref 65–99)
HCT VFR BLD AUTO: 34.9 % (ref 37–47)
HGB BLD-MCNC: 11.4 G/DL (ref 12–16)
IMM GRANULOCYTES # BLD AUTO: 0.01 K/UL (ref 0–0.11)
IMM GRANULOCYTES NFR BLD AUTO: 0.2 % (ref 0–0.9)
INR PPP: 0.9 (ref 0.87–1.13)
LYMPHOCYTES # BLD AUTO: 0.92 K/UL (ref 1–4.8)
LYMPHOCYTES NFR BLD: 20.6 % (ref 22–41)
MCH RBC QN AUTO: 30.2 PG (ref 27–33)
MCHC RBC AUTO-ENTMCNC: 32.7 G/DL (ref 33.6–35)
MCV RBC AUTO: 92.3 FL (ref 81.4–97.8)
MONOCYTES # BLD AUTO: 0.42 K/UL (ref 0–0.85)
MONOCYTES NFR BLD AUTO: 9.4 % (ref 0–13.4)
NEUTROPHILS # BLD AUTO: 2.99 K/UL (ref 2–7.15)
NEUTROPHILS NFR BLD: 66.9 % (ref 44–72)
NRBC # BLD AUTO: 0 K/UL
NRBC BLD-RTO: 0 /100 WBC
PLATELET # BLD AUTO: 364 K/UL (ref 164–446)
PMV BLD AUTO: 10.4 FL (ref 9–12.9)
POTASSIUM SERPL-SCNC: 3.3 MMOL/L (ref 3.6–5.5)
PROT SERPL-MCNC: 8.1 G/DL (ref 6–8.2)
PROTHROMBIN TIME: 12.3 SEC (ref 12–14.6)
RBC # BLD AUTO: 3.78 M/UL (ref 4.2–5.4)
SODIUM SERPL-SCNC: 142 MMOL/L (ref 135–145)
WBC # BLD AUTO: 4.5 K/UL (ref 4.8–10.8)

## 2020-01-22 PROCEDURE — 80053 COMPREHEN METABOLIC PANEL: CPT

## 2020-01-22 PROCEDURE — 36415 COLL VENOUS BLD VENIPUNCTURE: CPT

## 2020-01-22 PROCEDURE — 85730 THROMBOPLASTIN TIME PARTIAL: CPT

## 2020-01-22 PROCEDURE — 85025 COMPLETE CBC W/AUTO DIFF WBC: CPT

## 2020-01-22 PROCEDURE — 85610 PROTHROMBIN TIME: CPT

## 2020-01-22 PROCEDURE — 99213 OFFICE O/P EST LOW 20 MIN: CPT | Performed by: FAMILY MEDICINE

## 2020-01-22 ASSESSMENT — PATIENT HEALTH QUESTIONNAIRE - PHQ9: CLINICAL INTERPRETATION OF PHQ2 SCORE: 0

## 2020-01-22 NOTE — PROGRESS NOTES
"Subjective:      Martha Oliver is a 60 y.o. female who presents with Epistaxis    HPI:    Recurrent epistaxis  The patient is here with acute complaints of epistaxis. She states that these have been ongoing for the past 4 weeks. These typically occur out of her left nostril and occasionally from the right. These worsened last week and began occurring almost every day. Her last episode was this morning from the left and only lasted for about 2 minutes. She usually has epistaxis at work and occasionally at home. They occur for about 20 minutes at home and about 30 minutes at work. They begin randomly without any sneezing or nose blowing.  She pinches her nose to stop the bleeding. She has been using a humidifier at home, and she has been applying Vaseline to her nose. She has called into work twice in the past week for this and has left work early once, and she would like [place] paperwork to be completed.     She also has a history of Castleman disease diagnosed in 2014. She has previously undergone radiation treatments in 2015. Her most recent PET scan done in November 2019 showed overall slight improvement and partial response to therapy with similar size of bilateral neck lymph nodes and right pelvic lymph nodes demonstrating slight decrease in the FDG uptake. She is followed by Dr. Dallas for this issue. Castleman's has the potential for a low platelet count, but her prior lab results have not shown evidence of this.       Past medical history, past surgical history, family history reviewed-no changes    Social history reviewed-no changes    Allergies reviewed-no changes    Medications reviewed-no changes      ROS:  As per the HPI as shown above, the rest are negative.       Objective:     /70 (BP Location: Left arm, Patient Position: Sitting, BP Cuff Size: Adult)   Pulse 89   Temp 36.5 °C (97.7 °F) (Temporal)   Ht 1.549 m (5' 1\")   Wt 50 kg (110 lb 3.7 oz)   SpO2 98%   BMI 20.83 kg/m²     Physical Exam "     Examined alert, awake, oriented, not in distress  Nose-Very small amount of blood in the left nostril  Neck-supple, no lymphadenopathy, no thyromegaly  Lungs-clear to auscultation, no rales, no wheezes  Heart-regular rate and rhythm, no murmur  Extremities-no edema, clubbing, cyanosis  Skin- mild pallor of the skin, conjunctiva, palms, and nailbeds     Assessment/Plan:     1. Recurrent epistaxis  This is a new issue that has been ongoing for the past 1 month. She has been having recurrent epistaxis with the episodes becoming almost daily for the past week. We will evaluate if she has any bleeding disorder related to her Castleman's disease. We will check her CBC for platelet count and PT/PTT to rule out other bleeding disorders. We will also make sure she is not anemic. We will check CMP to rule out liver or kidney disease. In the meantime, we will have her do Ponaris nasal spray to lubricate the nose and continue with the humidifier every night while sleeping. Advised against nose blowing or nose picking. ER precautions discussed including bleeding for greater than 30 minutes. Consider ENT referral depending on the blood work results.   - CBC WITH DIFFERENTIAL; Future  - Comp Metabolic Panel; Future  - Prothrombin Time; Future  - APTT; Future       IAlex (Scribe), am scribing for, and in the presence of, Carol Juarez MD     Electronically signed by: Alex Mott (Scribe), 1/22/2020    ICarol MD personally performed the services described in this documentation, as scribed by Alex Mott in my presence, and it is both accurate and complete.

## 2020-01-30 ENCOUNTER — TELEPHONE (OUTPATIENT)
Dept: MEDICAL GROUP | Facility: PHYSICIAN GROUP | Age: 60
End: 2020-01-30

## 2020-01-30 NOTE — TELEPHONE ENCOUNTER
Pt  came in to drop off [facility] paperwork for  to fill out. He stated that  knows he is dropping it off and asked if he could get a phone call once it is faxed . Thank you. Please call pt with any questions.  I left the paperwork on Matthieu desk.

## 2020-05-02 ENCOUNTER — HOSPITAL ENCOUNTER (OUTPATIENT)
Dept: LAB | Facility: MEDICAL CENTER | Age: 60
End: 2020-05-02
Attending: FAMILY MEDICINE
Payer: COMMERCIAL

## 2020-05-02 ENCOUNTER — HOSPITAL ENCOUNTER (OUTPATIENT)
Dept: LAB | Facility: MEDICAL CENTER | Age: 60
End: 2020-05-02
Payer: COMMERCIAL

## 2020-05-02 DIAGNOSIS — R73.01 IMPAIRED FASTING BLOOD SUGAR: ICD-10-CM

## 2020-05-02 DIAGNOSIS — Z11.59 NEED FOR HEPATITIS C SCREENING TEST: ICD-10-CM

## 2020-05-02 DIAGNOSIS — I10 ESSENTIAL HYPERTENSION: ICD-10-CM

## 2020-05-02 DIAGNOSIS — D47.Z2 CASTLEMAN DISEASE (HCC): ICD-10-CM

## 2020-05-02 DIAGNOSIS — E78.5 DYSLIPIDEMIA: ICD-10-CM

## 2020-05-02 LAB
ALBUMIN SERPL BCP-MCNC: 5 G/DL (ref 3.2–4.9)
ALBUMIN/GLOB SERPL: 1.4 G/DL
ALP SERPL-CCNC: 146 U/L (ref 30–99)
ALT SERPL-CCNC: 33 U/L (ref 2–50)
ANION GAP SERPL CALC-SCNC: 14 MMOL/L (ref 7–16)
AST SERPL-CCNC: 32 U/L (ref 12–45)
BASOPHILS # BLD AUTO: 1.1 % (ref 0–1.8)
BASOPHILS # BLD: 0.05 K/UL (ref 0–0.12)
BDY FAT % MEASURED: 28.5 %
BILIRUB SERPL-MCNC: 0.5 MG/DL (ref 0.1–1.5)
BP DIAS: 78 MMHG
BP SYS: 136 MMHG
BUN SERPL-MCNC: 12 MG/DL (ref 8–22)
CALCIUM SERPL-MCNC: 9.6 MG/DL (ref 8.5–10.5)
CHLORIDE SERPL-SCNC: 101 MMOL/L (ref 96–112)
CHOLEST SERPL-MCNC: 149 MG/DL (ref 100–199)
CHOLEST SERPL-MCNC: 149 MG/DL (ref 100–199)
CO2 SERPL-SCNC: 26 MMOL/L (ref 20–33)
CREAT SERPL-MCNC: 0.53 MG/DL (ref 0.5–1.4)
DIABETES HTDIA: NO
EOSINOPHIL # BLD AUTO: 0.14 K/UL (ref 0–0.51)
EOSINOPHIL NFR BLD: 3.1 % (ref 0–6.9)
ERYTHROCYTE [DISTWIDTH] IN BLOOD BY AUTOMATED COUNT: 43.3 FL (ref 35.9–50)
EVENT NAME HTEVT: NORMAL
FASTING HTFAS: YES
FASTING STATUS PATIENT QL REPORTED: NORMAL
GLOBULIN SER CALC-MCNC: 3.5 G/DL (ref 1.9–3.5)
GLUCOSE SERPL-MCNC: 109 MG/DL (ref 65–99)
GLUCOSE SERPL-MCNC: 110 MG/DL (ref 65–99)
HCT VFR BLD AUTO: 47.9 % (ref 37–47)
HCV AB SER QL: NORMAL
HDLC SERPL-MCNC: 65 MG/DL
HDLC SERPL-MCNC: 66 MG/DL
HGB BLD-MCNC: 15.3 G/DL (ref 12–16)
HYPERTENSION HTHYP: YES
IMM GRANULOCYTES # BLD AUTO: 0.01 K/UL (ref 0–0.11)
IMM GRANULOCYTES NFR BLD AUTO: 0.2 % (ref 0–0.9)
LDLC SERPL CALC-MCNC: 69 MG/DL
LDLC SERPL CALC-MCNC: 71 MG/DL
LYMPHOCYTES # BLD AUTO: 1.54 K/UL (ref 1–4.8)
LYMPHOCYTES NFR BLD: 33.7 % (ref 22–41)
MCH RBC QN AUTO: 28.3 PG (ref 27–33)
MCHC RBC AUTO-ENTMCNC: 31.9 G/DL (ref 33.6–35)
MCV RBC AUTO: 88.7 FL (ref 81.4–97.8)
MONOCYTES # BLD AUTO: 0.5 K/UL (ref 0–0.85)
MONOCYTES NFR BLD AUTO: 10.9 % (ref 0–13.4)
NEUTROPHILS # BLD AUTO: 2.33 K/UL (ref 2–7.15)
NEUTROPHILS NFR BLD: 51 % (ref 44–72)
NRBC # BLD AUTO: 0 K/UL
NRBC BLD-RTO: 0 /100 WBC
PLATELET # BLD AUTO: 283 K/UL (ref 164–446)
PMV BLD AUTO: 10.5 FL (ref 9–12.9)
POTASSIUM SERPL-SCNC: 4 MMOL/L (ref 3.6–5.5)
PROT SERPL-MCNC: 8.5 G/DL (ref 6–8.2)
RBC # BLD AUTO: 5.4 M/UL (ref 4.2–5.4)
SCREENING LOC CITY HTCIT: NORMAL
SCREENING LOC STATE HTSTA: NORMAL
SCREENING LOCATION HTLOC: NORMAL
SMOKING HTSMO: NO
SODIUM SERPL-SCNC: 141 MMOL/L (ref 135–145)
SUBSCRIBER ID HTSID: NORMAL
TRIGL SERPL-MCNC: 67 MG/DL (ref 0–149)
TRIGL SERPL-MCNC: 68 MG/DL (ref 0–149)
WBC # BLD AUTO: 4.6 K/UL (ref 4.8–10.8)

## 2020-05-02 PROCEDURE — 80053 COMPREHEN METABOLIC PANEL: CPT

## 2020-05-02 PROCEDURE — S5190 WELLNESS ASSESSMENT BY NONPH: HCPCS

## 2020-05-02 PROCEDURE — 36415 COLL VENOUS BLD VENIPUNCTURE: CPT

## 2020-05-02 PROCEDURE — 80061 LIPID PANEL: CPT | Mod: 91

## 2020-05-02 PROCEDURE — 82947 ASSAY GLUCOSE BLOOD QUANT: CPT

## 2020-05-02 PROCEDURE — 83036 HEMOGLOBIN GLYCOSYLATED A1C: CPT

## 2020-05-02 PROCEDURE — 86803 HEPATITIS C AB TEST: CPT

## 2020-05-02 PROCEDURE — 85025 COMPLETE CBC W/AUTO DIFF WBC: CPT

## 2020-05-02 PROCEDURE — 80061 LIPID PANEL: CPT

## 2020-05-03 LAB
EST. AVERAGE GLUCOSE BLD GHB EST-MCNC: 134 MG/DL
HBA1C MFR BLD: 6.3 % (ref 0–5.6)

## 2020-05-14 ENCOUNTER — OFFICE VISIT (OUTPATIENT)
Dept: MEDICAL GROUP | Facility: PHYSICIAN GROUP | Age: 60
End: 2020-05-14
Payer: COMMERCIAL

## 2020-05-14 VITALS
OXYGEN SATURATION: 97 % | TEMPERATURE: 97.2 F | RESPIRATION RATE: 16 BRPM | SYSTOLIC BLOOD PRESSURE: 130 MMHG | DIASTOLIC BLOOD PRESSURE: 68 MMHG | BODY MASS INDEX: 20.54 KG/M2 | HEART RATE: 88 BPM | HEIGHT: 61 IN | WEIGHT: 108.8 LBS

## 2020-05-14 DIAGNOSIS — E78.5 DYSLIPIDEMIA: ICD-10-CM

## 2020-05-14 DIAGNOSIS — R73.01 IMPAIRED FASTING GLUCOSE: ICD-10-CM

## 2020-05-14 DIAGNOSIS — I10 ESSENTIAL HYPERTENSION: ICD-10-CM

## 2020-05-14 DIAGNOSIS — D47.Z2 CASTLEMAN DISEASE (HCC): ICD-10-CM

## 2020-05-14 PROCEDURE — 99214 OFFICE O/P EST MOD 30 MIN: CPT | Performed by: FAMILY MEDICINE

## 2020-05-14 ASSESSMENT — FIBROSIS 4 INDEX: FIB4 SCORE: 1.14

## 2020-05-15 NOTE — PROGRESS NOTES
"Subjective:      Martha Oliver is a 60 y.o. female who presents with Hypertension (follow up )            HPI     Patient is here for follow-up of her medical problems.    In terms of her hypertension, she continues to take nifedipine with good control of her blood pressure without side effects.    For her dyslipidemia, she continues to take simvastatin without myalgias.  Blood work was done before this visit.    We follow her for impaired fasting blood sugar and she manages this by watching her diet.  She also did a follow-up blood work before this visit.    She continues to follow-up with her hematologist/oncologist for Castleman's disease.  This was diagnosed in 2014 and she received radiation treatment in 2015.  Her last visit with Dr. Dallas was in December 2019.  She had a PET scan for surveillance in November 2019 which showed slight improvement and no evidence of new disease.  She said she was supposed to see Dr. Dallas's assistant this month but she did not go because she wants to wait when the COVID-19 problem settles down.  She has not felt any new lymph nodes.    Past medical history, past surgical history, family history reviewed-no changes    Social history reviewed-no changes    Allergies reviewed-no changes    Medications reviewed-no changes    ROS     As per HPI, the rest are negative.       Objective:     /68 (BP Location: Left arm, Patient Position: Sitting)   Pulse 88   Temp 36.2 °C (97.2 °F) (Temporal)   Resp 16   Ht 1.549 m (5' 1\")   Wt 49.4 kg (108 lb 12.8 oz)   SpO2 97%   BMI 20.56 kg/m²      Physical Exam     Examined alert, awake, oriented, not in distress    Neck-supple, no lymphadenopathy, no thyromegaly  Lungs-clear to auscultation, no rales, no wheezes  Heart-regular rate and rhythm, no murmur  Extremities-no edema, clubbing, cyanosis       Hospital Outpatient Visit on 05/02/2020   Component Date Value Ref Range Status   • Cholesterol,Tot 05/02/2020 149  100 - 199 mg/dL " Final   • Triglycerides 05/02/2020 67  0 - 149 mg/dL Final   • HDL 05/02/2020 65  >=40 mg/dL Final   • LDL 05/02/2020 71  <100 mg/dL Final   • Sodium 05/02/2020 141  135 - 145 mmol/L Final   • Potassium 05/02/2020 4.0  3.6 - 5.5 mmol/L Final   • Chloride 05/02/2020 101  96 - 112 mmol/L Final   • Co2 05/02/2020 26  20 - 33 mmol/L Final   • Anion Gap 05/02/2020 14.0  7.0 - 16.0 Final   • Glucose 05/02/2020 109* 65 - 99 mg/dL Final   • Bun 05/02/2020 12  8 - 22 mg/dL Final   • Creatinine 05/02/2020 0.53  0.50 - 1.40 mg/dL Final   • Calcium 05/02/2020 9.6  8.5 - 10.5 mg/dL Final   • AST(SGOT) 05/02/2020 32  12 - 45 U/L Final   • ALT(SGPT) 05/02/2020 33  2 - 50 U/L Final   • Alkaline Phosphatase 05/02/2020 146* 30 - 99 U/L Final   • Total Bilirubin 05/02/2020 0.5  0.1 - 1.5 mg/dL Final   • Albumin 05/02/2020 5.0* 3.2 - 4.9 g/dL Final   • Total Protein 05/02/2020 8.5* 6.0 - 8.2 g/dL Final   • Globulin 05/02/2020 3.5  1.9 - 3.5 g/dL Final   • A-G Ratio 05/02/2020 1.4  g/dL Final   • Glycohemoglobin 05/02/2020 6.3* 0.0 - 5.6 % Final    Comment: Increased risk for diabetes:  5.7 -6.4%  Diabetes:  >6.4%  Glycemic control for adults with diabetes:  <7.0%  The above interpretations are per ADA guidelines.  Diagnosis  of diabetes mellitus on the basis of elevated Hemoglobin A1c  should be confirmed by repeating the Hb A1c test.     • Est Avg Glucose 05/02/2020 134  mg/dL Final    Comment: The eAG calculation is based on the A1c-Derived Daily Glucose  (ADAG) study.  See the ADA's website for additional information.     • Hepatitis C Antibody 05/02/2020 Non-Reactive  Non-Reactive Final    Comment: Antibodies to HCV were not detected.  The Roche anti-HCV is a diagnostic test for the qualitative determination of  antibodies to the hepatitis C virus in human serum and plasma. The results  should be used and interpreted only in the context of the overall clinical  picture. A negative test result does not exclude the possibility of  exposure  to hepatitis C virus.  Note: Assay performance characteristics have not been established in  populations of immunocompromised or immunosuppressed patients.     • WBC 05/02/2020 4.6* 4.8 - 10.8 K/uL Final   • RBC 05/02/2020 5.40  4.20 - 5.40 M/uL Final   • Hemoglobin 05/02/2020 15.3  12.0 - 16.0 g/dL Final   • Hematocrit 05/02/2020 47.9* 37.0 - 47.0 % Final   • MCV 05/02/2020 88.7  81.4 - 97.8 fL Final   • MCH 05/02/2020 28.3  27.0 - 33.0 pg Final   • MCHC 05/02/2020 31.9* 33.6 - 35.0 g/dL Final   • RDW 05/02/2020 43.3  35.9 - 50.0 fL Final   • Platelet Count 05/02/2020 283  164 - 446 K/uL Final   • MPV 05/02/2020 10.5  9.0 - 12.9 fL Final   • Neutrophils-Polys 05/02/2020 51.00  44.00 - 72.00 % Final   • Lymphocytes 05/02/2020 33.70  22.00 - 41.00 % Final   • Monocytes 05/02/2020 10.90  0.00 - 13.40 % Final   • Eosinophils 05/02/2020 3.10  0.00 - 6.90 % Final   • Basophils 05/02/2020 1.10  0.00 - 1.80 % Final   • Immature Granulocytes 05/02/2020 0.20  0.00 - 0.90 % Final   • Nucleated RBC 05/02/2020 0.00  /100 WBC Final   • Neutrophils (Absolute) 05/02/2020 2.33  2.00 - 7.15 K/uL Final    Includes immature neutrophils, if present.   • Lymphs (Absolute) 05/02/2020 1.54  1.00 - 4.80 K/uL Final   • Monos (Absolute) 05/02/2020 0.50  0.00 - 0.85 K/uL Final   • Eos (Absolute) 05/02/2020 0.14  0.00 - 0.51 K/uL Final   • Baso (Absolute) 05/02/2020 0.05  0.00 - 0.12 K/uL Final   • Immature Granulocytes (abs) 05/02/2020 0.01  0.00 - 0.11 K/uL Final   • NRBC (Absolute) 05/02/2020 0.00  K/uL Final   • Fasting Status 05/02/2020 Fasting   Final   • GFR If  05/02/2020 >60  >60 mL/min/1.73 m 2 Final   • GFR If Non  05/02/2020 >60  >60 mL/min/1.73 m 2 Final          Assessment/Plan:       1. Essential hypertension  Controlled on nifedipine.  - Lipid Profile; Future  - Comp Metabolic Panel; Future  - HEMOGLOBIN A1C; Future  - CBC WITH DIFFERENTIAL; Future    2. Dyslipidemia  Cholesterol panel  all at target.  Continue simvastatin.  - Lipid Profile; Future  - Comp Metabolic Panel; Future  - HEMOGLOBIN A1C; Future  - CBC WITH DIFFERENTIAL; Future    3. Impaired fasting blood sugar  Fasting blood sugar and hemoglobin A1c are still borderline elevated consistent with prediabetes.  Continue to avoid the sweets and decrease the carbs so this will not become full-blown diabetes.  - Lipid Profile; Future  - Comp Metabolic Panel; Future  - HEMOGLOBIN A1C; Future  - CBC WITH DIFFERENTIAL; Future    4. Castleman disease (HCC)  Her last PET scan showed improvement and no evidence of new disease.  Continue follow-up with Dr. Dallas and advised to make an appointment for June.  We will follow along.  - Lipid Profile; Future  - Comp Metabolic Panel; Future  - HEMOGLOBIN A1C; Future  - CBC WITH DIFFERENTIAL; Future      Please note that this dictation was created using voice recognition software. I have worked with consultants from the vendor as well as technical experts from [facility]  [employer] to optimize the interface. I have made every reasonable attempt to correct obvious errors, but I expect that there are errors of grammar and possibly content I did not discover before finalizing the note.

## 2020-08-22 ENCOUNTER — HOSPITAL ENCOUNTER (OUTPATIENT)
Dept: RADIOLOGY | Facility: MEDICAL CENTER | Age: 60
End: 2020-08-22
Attending: FAMILY MEDICINE
Payer: COMMERCIAL

## 2020-08-22 DIAGNOSIS — Z12.31 VISIT FOR SCREENING MAMMOGRAM: ICD-10-CM

## 2020-08-22 PROCEDURE — 77067 SCR MAMMO BI INCL CAD: CPT

## 2020-08-24 DIAGNOSIS — I10 ESSENTIAL HYPERTENSION: ICD-10-CM

## 2020-09-21 ENCOUNTER — IMMUNIZATION (OUTPATIENT)
Dept: OCCUPATIONAL MEDICINE | Facility: CLINIC | Age: 60
End: 2020-09-21

## 2020-09-21 DIAGNOSIS — Z23 NEED FOR VACCINATION: ICD-10-CM

## 2020-09-23 ENCOUNTER — HOSPITAL ENCOUNTER (OUTPATIENT)
Facility: MEDICAL CENTER | Age: 60
End: 2020-09-23
Attending: PREVENTIVE MEDICINE
Payer: COMMERCIAL

## 2020-09-23 ENCOUNTER — NON-PROVIDER VISIT (OUTPATIENT)
Dept: OCCUPATIONAL MEDICINE | Facility: CLINIC | Age: 60
End: 2020-09-23

## 2020-09-23 DIAGNOSIS — Z02.1 PRE-EMPLOYMENT HEALTH SCREENING EXAMINATION: ICD-10-CM

## 2020-09-23 PROCEDURE — 86480 TB TEST CELL IMMUN MEASURE: CPT | Performed by: NURSE PRACTITIONER

## 2020-09-24 PROCEDURE — 90686 IIV4 VACC NO PRSV 0.5 ML IM: CPT | Performed by: PREVENTIVE MEDICINE

## 2020-09-25 LAB
GAMMA INTERFERON BACKGROUND BLD IA-ACNC: 0.12 IU/ML
M TB IFN-G BLD-IMP: NEGATIVE
M TB IFN-G CD4+ BCKGRND COR BLD-ACNC: -0.02 IU/ML
MITOGEN IGNF BCKGRD COR BLD-ACNC: >10 IU/ML
QFT TB2 - NIL TBQ2: -0.01 IU/ML

## 2020-09-27 DIAGNOSIS — E78.5 DYSLIPIDEMIA: ICD-10-CM

## 2020-09-28 RX ORDER — SIMVASTATIN 20 MG
TABLET ORAL
Qty: 90 TAB | Refills: 1 | Status: SHIPPED | OUTPATIENT
Start: 2020-09-28 | End: 2021-04-05

## 2020-11-07 ENCOUNTER — HOSPITAL ENCOUNTER (OUTPATIENT)
Dept: LAB | Facility: MEDICAL CENTER | Age: 60
End: 2020-11-07
Attending: FAMILY MEDICINE
Payer: COMMERCIAL

## 2020-11-07 DIAGNOSIS — D47.Z2 CASTLEMAN DISEASE (HCC): ICD-10-CM

## 2020-11-07 DIAGNOSIS — R73.01 IMPAIRED FASTING GLUCOSE: ICD-10-CM

## 2020-11-07 DIAGNOSIS — I10 ESSENTIAL HYPERTENSION: ICD-10-CM

## 2020-11-07 DIAGNOSIS — E78.5 DYSLIPIDEMIA: ICD-10-CM

## 2020-11-07 LAB
ALBUMIN SERPL BCP-MCNC: 4.9 G/DL (ref 3.2–4.9)
ALBUMIN/GLOB SERPL: 1.5 G/DL
ALP SERPL-CCNC: 134 U/L (ref 30–99)
ALT SERPL-CCNC: 23 U/L (ref 2–50)
ANION GAP SERPL CALC-SCNC: 9 MMOL/L (ref 7–16)
AST SERPL-CCNC: 21 U/L (ref 12–45)
BASOPHILS # BLD AUTO: 1.2 % (ref 0–1.8)
BASOPHILS # BLD: 0.04 K/UL (ref 0–0.12)
BILIRUB SERPL-MCNC: 0.5 MG/DL (ref 0.1–1.5)
BUN SERPL-MCNC: 11 MG/DL (ref 8–22)
CALCIUM SERPL-MCNC: 9.7 MG/DL (ref 8.5–10.5)
CHLORIDE SERPL-SCNC: 100 MMOL/L (ref 96–112)
CHOLEST SERPL-MCNC: 140 MG/DL (ref 100–199)
CO2 SERPL-SCNC: 29 MMOL/L (ref 20–33)
CREAT SERPL-MCNC: 0.49 MG/DL (ref 0.5–1.4)
EOSINOPHIL # BLD AUTO: 0.12 K/UL (ref 0–0.51)
EOSINOPHIL NFR BLD: 3.6 % (ref 0–6.9)
ERYTHROCYTE [DISTWIDTH] IN BLOOD BY AUTOMATED COUNT: 42.3 FL (ref 35.9–50)
EST. AVERAGE GLUCOSE BLD GHB EST-MCNC: 117 MG/DL
FASTING STATUS PATIENT QL REPORTED: NORMAL
GLOBULIN SER CALC-MCNC: 3.3 G/DL (ref 1.9–3.5)
GLUCOSE SERPL-MCNC: 103 MG/DL (ref 65–99)
HBA1C MFR BLD: 5.7 % (ref 0–5.6)
HCT VFR BLD AUTO: 46 % (ref 37–47)
HDLC SERPL-MCNC: 63 MG/DL
HGB BLD-MCNC: 15 G/DL (ref 12–16)
IMM GRANULOCYTES # BLD AUTO: 0.01 K/UL (ref 0–0.11)
IMM GRANULOCYTES NFR BLD AUTO: 0.3 % (ref 0–0.9)
LDLC SERPL CALC-MCNC: 62 MG/DL
LYMPHOCYTES # BLD AUTO: 1.05 K/UL (ref 1–4.8)
LYMPHOCYTES NFR BLD: 31.5 % (ref 22–41)
MCH RBC QN AUTO: 29 PG (ref 27–33)
MCHC RBC AUTO-ENTMCNC: 32.6 G/DL (ref 33.6–35)
MCV RBC AUTO: 89 FL (ref 81.4–97.8)
MONOCYTES # BLD AUTO: 0.37 K/UL (ref 0–0.85)
MONOCYTES NFR BLD AUTO: 11.1 % (ref 0–13.4)
NEUTROPHILS # BLD AUTO: 1.74 K/UL (ref 2–7.15)
NEUTROPHILS NFR BLD: 52.3 % (ref 44–72)
NRBC # BLD AUTO: 0 K/UL
NRBC BLD-RTO: 0 /100 WBC
PLATELET # BLD AUTO: 292 K/UL (ref 164–446)
PMV BLD AUTO: 10 FL (ref 9–12.9)
POTASSIUM SERPL-SCNC: 3.6 MMOL/L (ref 3.6–5.5)
PROT SERPL-MCNC: 8.2 G/DL (ref 6–8.2)
RBC # BLD AUTO: 5.17 M/UL (ref 4.2–5.4)
SODIUM SERPL-SCNC: 138 MMOL/L (ref 135–145)
TRIGL SERPL-MCNC: 77 MG/DL (ref 0–149)
WBC # BLD AUTO: 3.3 K/UL (ref 4.8–10.8)

## 2020-11-07 PROCEDURE — 85025 COMPLETE CBC W/AUTO DIFF WBC: CPT

## 2020-11-07 PROCEDURE — 36415 COLL VENOUS BLD VENIPUNCTURE: CPT

## 2020-11-07 PROCEDURE — 80053 COMPREHEN METABOLIC PANEL: CPT

## 2020-11-07 PROCEDURE — 80061 LIPID PANEL: CPT

## 2020-11-07 PROCEDURE — 83036 HEMOGLOBIN GLYCOSYLATED A1C: CPT

## 2020-11-23 ENCOUNTER — OFFICE VISIT (OUTPATIENT)
Dept: MEDICAL GROUP | Facility: PHYSICIAN GROUP | Age: 60
End: 2020-11-23
Payer: COMMERCIAL

## 2020-11-23 VITALS
SYSTOLIC BLOOD PRESSURE: 130 MMHG | HEIGHT: 61 IN | WEIGHT: 106.7 LBS | OXYGEN SATURATION: 97 % | TEMPERATURE: 97.6 F | HEART RATE: 86 BPM | BODY MASS INDEX: 20.15 KG/M2 | DIASTOLIC BLOOD PRESSURE: 70 MMHG

## 2020-11-23 DIAGNOSIS — E78.5 DYSLIPIDEMIA: ICD-10-CM

## 2020-11-23 DIAGNOSIS — I10 ESSENTIAL HYPERTENSION: ICD-10-CM

## 2020-11-23 DIAGNOSIS — D47.Z2 CASTLEMAN DISEASE (HCC): ICD-10-CM

## 2020-11-23 DIAGNOSIS — R73.01 IMPAIRED FASTING GLUCOSE: ICD-10-CM

## 2020-11-23 PROCEDURE — 99214 OFFICE O/P EST MOD 30 MIN: CPT | Performed by: FAMILY MEDICINE

## 2020-11-23 ASSESSMENT — FIBROSIS 4 INDEX: FIB4 SCORE: 0.87

## 2020-11-23 NOTE — PROGRESS NOTES
"Subjective:      Martha Oliver is a 60 y.o. female who presents with Hypertension and Medication Refill (nifedipine)            HPI     Patient returns for follow-up of her medical problems.    In terms of her hypertension, this is under control on nifedipine without side effects.  She said home blood pressures are running in the 130s over 70s.    She continues to take simvastatin for dyslipidemia without myalgias.  Blood work was done before this visit.    We follow her for impaired fasting blood sugar and she manages this by watching her diet.    In terms of consult months, the last time she was seen by her hematologist oncologist was in September 2020.  She was recommended to have an updated PET scan.  The last PET scan was in November 2019 that showed overall slight improvement and partial response to therapy with similar size of bilateral neck lymph nodes and right pelvic lymph nodes demonstrating slight decrease in the FDG uptake and no abnormal areas of increased uptake.  Patient denies any new palpable lymph nodes in her body.    She already got the flu shot this fall.  She needs the Shingrix but we are out of the vaccine.  She wants to hold off next visit.  She also is due for GYN exam/Pap smear this month.  We will do the GYN exam/Pap smear next visit.    Past medical history, past surgical history, family history reviewed-no changes    Social history reviewed-no changes    Allergies reviewed-no changes    Medications reviewed-no changes    ROS     As per HPI, the rest are negative.       Objective:     /60 (BP Location: Left arm, Patient Position: Sitting, BP Cuff Size: Adult)   Pulse 86   Temp 36.4 °C (97.6 °F) (Temporal)   Ht 1.549 m (5' 1\")   Wt 48.4 kg (106 lb 11.2 oz)   SpO2 97%   BMI 20.16 kg/m²      Physical Exam     Examined alert, awake, oriented, not in distress    Neck-supple, no lymphadenopathy, no thyromegaly  Lungs-clear to auscultation, no rales, no wheezes  Heart-regular rate " and rhythm, no murmur  Extremities-no edema, clubbing, cyanosis       Hospital Outpatient Visit on 11/07/2020   Component Date Value Ref Range Status   • WBC 11/07/2020 3.3* 4.8 - 10.8 K/uL Final   • RBC 11/07/2020 5.17  4.20 - 5.40 M/uL Final   • Hemoglobin 11/07/2020 15.0  12.0 - 16.0 g/dL Final   • Hematocrit 11/07/2020 46.0  37.0 - 47.0 % Final   • MCV 11/07/2020 89.0  81.4 - 97.8 fL Final   • MCH 11/07/2020 29.0  27.0 - 33.0 pg Final   • MCHC 11/07/2020 32.6* 33.6 - 35.0 g/dL Final   • RDW 11/07/2020 42.3  35.9 - 50.0 fL Final   • Platelet Count 11/07/2020 292  164 - 446 K/uL Final   • MPV 11/07/2020 10.0  9.0 - 12.9 fL Final   • Neutrophils-Polys 11/07/2020 52.30  44.00 - 72.00 % Final   • Lymphocytes 11/07/2020 31.50  22.00 - 41.00 % Final   • Monocytes 11/07/2020 11.10  0.00 - 13.40 % Final   • Eosinophils 11/07/2020 3.60  0.00 - 6.90 % Final   • Basophils 11/07/2020 1.20  0.00 - 1.80 % Final   • Immature Granulocytes 11/07/2020 0.30  0.00 - 0.90 % Final   • Nucleated RBC 11/07/2020 0.00  /100 WBC Final   • Neutrophils (Absolute) 11/07/2020 1.74* 2.00 - 7.15 K/uL Final    Includes immature neutrophils, if present.   • Lymphs (Absolute) 11/07/2020 1.05  1.00 - 4.80 K/uL Final   • Monos (Absolute) 11/07/2020 0.37  0.00 - 0.85 K/uL Final   • Eos (Absolute) 11/07/2020 0.12  0.00 - 0.51 K/uL Final   • Baso (Absolute) 11/07/2020 0.04  0.00 - 0.12 K/uL Final   • Immature Granulocytes (abs) 11/07/2020 0.01  0.00 - 0.11 K/uL Final   • NRBC (Absolute) 11/07/2020 0.00  K/uL Final   • Glycohemoglobin 11/07/2020 5.7* 0.0 - 5.6 % Final    Comment: Increased risk for diabetes:  5.7 -6.4%  Diabetes:  >6.4%  Glycemic control for adults with diabetes:  <7.0%  The above interpretations are per ADA guidelines.  Diagnosis  of diabetes mellitus on the basis of elevated Hemoglobin A1c  should be confirmed by repeating the Hb A1c test.     • Est Avg Glucose 11/07/2020 117  mg/dL Final    Comment: The eAG calculation is based on the  A1c-Derived Daily Glucose  (ADAG) study.  See the ADA's website for additional information.     • Sodium 11/07/2020 138  135 - 145 mmol/L Final   • Potassium 11/07/2020 3.6  3.6 - 5.5 mmol/L Final   • Chloride 11/07/2020 100  96 - 112 mmol/L Final   • Co2 11/07/2020 29  20 - 33 mmol/L Final   • Anion Gap 11/07/2020 9.0  7.0 - 16.0 Final   • Glucose 11/07/2020 103* 65 - 99 mg/dL Final   • Bun 11/07/2020 11  8 - 22 mg/dL Final   • Creatinine 11/07/2020 0.49* 0.50 - 1.40 mg/dL Final   • Calcium 11/07/2020 9.7  8.5 - 10.5 mg/dL Final   • AST(SGOT) 11/07/2020 21  12 - 45 U/L Final   • ALT(SGPT) 11/07/2020 23  2 - 50 U/L Final   • Alkaline Phosphatase 11/07/2020 134* 30 - 99 U/L Final   • Total Bilirubin 11/07/2020 0.5  0.1 - 1.5 mg/dL Final   • Albumin 11/07/2020 4.9  3.2 - 4.9 g/dL Final   • Total Protein 11/07/2020 8.2  6.0 - 8.2 g/dL Final   • Globulin 11/07/2020 3.3  1.9 - 3.5 g/dL Final   • A-G Ratio 11/07/2020 1.5  g/dL Final   • Cholesterol,Tot 11/07/2020 140  100 - 199 mg/dL Final   • Triglycerides 11/07/2020 77  0 - 149 mg/dL Final   • HDL 11/07/2020 63  >=40 mg/dL Final   • LDL 11/07/2020 62  <100 mg/dL Final   • Fasting Status 11/07/2020 Fasting   Final   • GFR If  11/07/2020 >60  >60 mL/min/1.73 m 2 Final   • GFR If Non  11/07/2020 >60  >60 mL/min/1.73 m 2 Final          Assessment/Plan:        1. Essential hypertension  Controlled on her medication.  - Lipid Profile; Future  - Comp Metabolic Panel; Future  - HEMOGLOBIN A1C; Future  - CBC WITH DIFFERENTIAL; Future    2. Dyslipidemia  All at target on her cholesterol medication.  - Lipid Profile; Future  - Comp Metabolic Panel; Future  - HEMOGLOBIN A1C; Future  - CBC WITH DIFFERENTIAL; Future    3. Impaired fasting blood sugar  Fasting blood sugar still borderline elevated including the hemoglobin A1c consistent with prediabetes.  Patient will continue to avoid sweets and decrease the carbs.  Recheck blood work next visit.  -  Lipid Profile; Future  - Comp Metabolic Panel; Future  - HEMOGLOBIN A1C; Future  - CBC WITH DIFFERENTIAL; Future    4. Castleman disease (HCC)  Her hematologist oncologist wants her to have an updated PET scan.  She said this is not set up yet.  She has a follow-up appointment with her hematologist oncologist in March 2021.  Advised to call the hematology/oncologist office regarding order for the PET scan.  Her WBC count is lower than her baseline.  Patient denies any B symptoms.  We will do follow-up CBC next visit.  She will also have follow-up blood work through her hematologist/oncologist.  - Lipid Profile; Future  - Comp Metabolic Panel; Future  - HEMOGLOBIN A1C; Future  - CBC WITH DIFFERENTIAL; Future    Follow-up in 6 months.      Please note that this dictation was created using voice recognition software. I have worked with consultants from the vendor as well as technical experts from [facility]  [employer] to optimize the interface. I have made every reasonable attempt to correct obvious errors, but I expect that there are errors of grammar and possibly content I did not discover before finalizing the note.

## 2020-12-20 DIAGNOSIS — Z23 NEED FOR VACCINATION: ICD-10-CM

## 2021-01-12 ENCOUNTER — TELEPHONE (OUTPATIENT)
Dept: MEDICAL GROUP | Facility: PHYSICIAN GROUP | Age: 61
End: 2021-01-12

## 2021-01-12 NOTE — TELEPHONE ENCOUNTER
PAtient called and wanted to know if it was safe for her to get the Covid Vaccine.  She stated that one of the side affects is Swollen lymph nodes and she stated that she has that condition.  Please advise

## 2021-01-12 NOTE — TELEPHONE ENCOUNTER
Okay to get the COVID-19 vaccine.  If she develops swollen lymph nodes, this should clear in few weeks to a month.  If ever she developed swollen lymph nodes that do not go away in a month then she will need to let me know.

## 2021-01-12 NOTE — TELEPHONE ENCOUNTER
Phone Number Called: [phone] (home)     Call outcome: Left detailed message for patient. Informed to call back with any additional questions.    Message: and "[employer] message sent

## 2021-01-18 ENCOUNTER — TELEPHONE (OUTPATIENT)
Dept: MEDICAL GROUP | Facility: PHYSICIAN GROUP | Age: 61
End: 2021-01-18

## 2021-02-06 ENCOUNTER — IMMUNIZATION (OUTPATIENT)
Dept: FAMILY PLANNING/WOMEN'S HEALTH CLINIC | Facility: IMMUNIZATION CENTER | Age: 61
End: 2021-02-06
Attending: FAMILY MEDICINE
Payer: COMMERCIAL

## 2021-02-06 DIAGNOSIS — Z23 ENCOUNTER FOR VACCINATION: Primary | ICD-10-CM

## 2021-02-06 DIAGNOSIS — Z23 NEED FOR VACCINATION: ICD-10-CM

## 2021-02-06 PROCEDURE — 0001A PFIZER SARS-COV-2 VACCINE: CPT

## 2021-02-06 PROCEDURE — 91300 PFIZER SARS-COV-2 VACCINE: CPT

## 2021-02-27 ENCOUNTER — IMMUNIZATION (OUTPATIENT)
Dept: FAMILY PLANNING/WOMEN'S HEALTH CLINIC | Facility: IMMUNIZATION CENTER | Age: 61
End: 2021-02-27
Payer: COMMERCIAL

## 2021-02-27 DIAGNOSIS — Z23 ENCOUNTER FOR VACCINATION: Primary | ICD-10-CM

## 2021-02-27 PROCEDURE — 91300 PFIZER SARS-COV-2 VACCINE: CPT

## 2021-02-27 PROCEDURE — 0002A PFIZER SARS-COV-2 VACCINE: CPT

## 2021-03-04 ENCOUNTER — EH NON-PROVIDER (OUTPATIENT)
Dept: OCCUPATIONAL MEDICINE | Facility: CLINIC | Age: 61
End: 2021-03-04

## 2021-03-04 DIAGNOSIS — Z02.89 ENCOUNTER FOR OCCUPATIONAL HEALTH EXAMINATION INVOLVING RESPIRATOR: ICD-10-CM

## 2021-03-04 PROCEDURE — 94375 RESPIRATORY FLOW VOLUME LOOP: CPT | Performed by: PREVENTIVE MEDICINE

## 2021-03-16 ENCOUNTER — HOSPITAL ENCOUNTER (OUTPATIENT)
Dept: RADIOLOGY | Facility: MEDICAL CENTER | Age: 61
End: 2021-03-16
Attending: INTERNAL MEDICINE
Payer: COMMERCIAL

## 2021-03-16 DIAGNOSIS — D47.Z2 ANGIOLYMPHOID HYPERPLASIA (HCC): ICD-10-CM

## 2021-03-16 PROCEDURE — A9552 F18 FDG: HCPCS

## 2021-04-04 DIAGNOSIS — E78.5 DYSLIPIDEMIA: ICD-10-CM

## 2021-04-05 RX ORDER — SIMVASTATIN 20 MG
TABLET ORAL
Qty: 90 TABLET | Refills: 1 | Status: SHIPPED | OUTPATIENT
Start: 2021-04-05 | End: 2021-10-04

## 2021-05-22 ENCOUNTER — HOSPITAL ENCOUNTER (OUTPATIENT)
Dept: LAB | Facility: MEDICAL CENTER | Age: 61
End: 2021-05-22
Attending: FAMILY MEDICINE
Payer: COMMERCIAL

## 2021-05-22 DIAGNOSIS — I10 ESSENTIAL HYPERTENSION: ICD-10-CM

## 2021-05-22 DIAGNOSIS — D47.Z2 CASTLEMAN DISEASE (HCC): ICD-10-CM

## 2021-05-22 DIAGNOSIS — E78.5 DYSLIPIDEMIA: ICD-10-CM

## 2021-05-22 DIAGNOSIS — R73.01 IMPAIRED FASTING GLUCOSE: ICD-10-CM

## 2021-05-22 LAB
ALBUMIN SERPL BCP-MCNC: 4.9 G/DL (ref 3.2–4.9)
ALBUMIN/GLOB SERPL: 1.3 G/DL
ALP SERPL-CCNC: 159 U/L (ref 30–99)
ALT SERPL-CCNC: 24 U/L (ref 2–50)
ANION GAP SERPL CALC-SCNC: 11 MMOL/L (ref 7–16)
AST SERPL-CCNC: 23 U/L (ref 12–45)
BASOPHILS # BLD AUTO: 1.1 % (ref 0–1.8)
BASOPHILS # BLD: 0.05 K/UL (ref 0–0.12)
BILIRUB SERPL-MCNC: 0.6 MG/DL (ref 0.1–1.5)
BUN SERPL-MCNC: 21 MG/DL (ref 8–22)
CALCIUM SERPL-MCNC: 9.6 MG/DL (ref 8.5–10.5)
CHLORIDE SERPL-SCNC: 103 MMOL/L (ref 96–112)
CHOLEST SERPL-MCNC: 141 MG/DL (ref 100–199)
CO2 SERPL-SCNC: 28 MMOL/L (ref 20–33)
CREAT SERPL-MCNC: 0.5 MG/DL (ref 0.5–1.4)
EOSINOPHIL # BLD AUTO: 0.18 K/UL (ref 0–0.51)
EOSINOPHIL NFR BLD: 4.1 % (ref 0–6.9)
ERYTHROCYTE [DISTWIDTH] IN BLOOD BY AUTOMATED COUNT: 42.9 FL (ref 35.9–50)
EST. AVERAGE GLUCOSE BLD GHB EST-MCNC: 126 MG/DL
FASTING STATUS PATIENT QL REPORTED: NORMAL
GLOBULIN SER CALC-MCNC: 3.9 G/DL (ref 1.9–3.5)
GLUCOSE SERPL-MCNC: 103 MG/DL (ref 65–99)
HBA1C MFR BLD: 6 % (ref 4–5.6)
HCT VFR BLD AUTO: 47.9 % (ref 37–47)
HDLC SERPL-MCNC: 59 MG/DL
HGB BLD-MCNC: 15.9 G/DL (ref 12–16)
IMM GRANULOCYTES # BLD AUTO: 0.01 K/UL (ref 0–0.11)
IMM GRANULOCYTES NFR BLD AUTO: 0.2 % (ref 0–0.9)
LDLC SERPL CALC-MCNC: 66 MG/DL
LYMPHOCYTES # BLD AUTO: 1.4 K/UL (ref 1–4.8)
LYMPHOCYTES NFR BLD: 31.8 % (ref 22–41)
MCH RBC QN AUTO: 30.2 PG (ref 27–33)
MCHC RBC AUTO-ENTMCNC: 33.2 G/DL (ref 33.6–35)
MCV RBC AUTO: 90.9 FL (ref 81.4–97.8)
MONOCYTES # BLD AUTO: 0.45 K/UL (ref 0–0.85)
MONOCYTES NFR BLD AUTO: 10.2 % (ref 0–13.4)
NEUTROPHILS # BLD AUTO: 2.31 K/UL (ref 2–7.15)
NEUTROPHILS NFR BLD: 52.6 % (ref 44–72)
NRBC # BLD AUTO: 0 K/UL
NRBC BLD-RTO: 0 /100 WBC
PLATELET # BLD AUTO: 300 K/UL (ref 164–446)
PMV BLD AUTO: 9.9 FL (ref 9–12.9)
POTASSIUM SERPL-SCNC: 4.5 MMOL/L (ref 3.6–5.5)
PROT SERPL-MCNC: 8.8 G/DL (ref 6–8.2)
RBC # BLD AUTO: 5.27 M/UL (ref 4.2–5.4)
SODIUM SERPL-SCNC: 142 MMOL/L (ref 135–145)
TRIGL SERPL-MCNC: 82 MG/DL (ref 0–149)
WBC # BLD AUTO: 4.4 K/UL (ref 4.8–10.8)

## 2021-05-22 PROCEDURE — 80053 COMPREHEN METABOLIC PANEL: CPT

## 2021-05-22 PROCEDURE — 36415 COLL VENOUS BLD VENIPUNCTURE: CPT

## 2021-05-22 PROCEDURE — 83036 HEMOGLOBIN GLYCOSYLATED A1C: CPT

## 2021-05-22 PROCEDURE — 85025 COMPLETE CBC W/AUTO DIFF WBC: CPT

## 2021-05-22 PROCEDURE — 80061 LIPID PANEL: CPT

## 2021-05-25 ENCOUNTER — HOSPITAL ENCOUNTER (OUTPATIENT)
Facility: MEDICAL CENTER | Age: 61
End: 2021-05-25
Attending: FAMILY MEDICINE
Payer: COMMERCIAL

## 2021-05-25 ENCOUNTER — OFFICE VISIT (OUTPATIENT)
Dept: MEDICAL GROUP | Facility: PHYSICIAN GROUP | Age: 61
End: 2021-05-25
Payer: COMMERCIAL

## 2021-05-25 VITALS
HEIGHT: 61 IN | DIASTOLIC BLOOD PRESSURE: 70 MMHG | SYSTOLIC BLOOD PRESSURE: 120 MMHG | OXYGEN SATURATION: 98 % | BODY MASS INDEX: 20.4 KG/M2 | WEIGHT: 108.03 LBS | HEART RATE: 70 BPM | TEMPERATURE: 97.4 F

## 2021-05-25 DIAGNOSIS — D47.Z2 CASTLEMAN DISEASE (HCC): ICD-10-CM

## 2021-05-25 DIAGNOSIS — I10 ESSENTIAL HYPERTENSION: ICD-10-CM

## 2021-05-25 DIAGNOSIS — Z11.51 SCREENING FOR HUMAN PAPILLOMAVIRUS (HPV): ICD-10-CM

## 2021-05-25 DIAGNOSIS — Z01.419 ENCOUNTER FOR ROUTINE GYNECOLOGICAL EXAMINATION WITH PAPANICOLAOU SMEAR OF CERVIX: ICD-10-CM

## 2021-05-25 DIAGNOSIS — R73.01 IMPAIRED FASTING GLUCOSE: ICD-10-CM

## 2021-05-25 DIAGNOSIS — Z23 NEED FOR SHINGLES VACCINE: ICD-10-CM

## 2021-05-25 DIAGNOSIS — E78.5 DYSLIPIDEMIA: ICD-10-CM

## 2021-05-25 PROCEDURE — 90750 HZV VACC RECOMBINANT IM: CPT | Performed by: FAMILY MEDICINE

## 2021-05-25 PROCEDURE — 90471 IMMUNIZATION ADMIN: CPT | Performed by: FAMILY MEDICINE

## 2021-05-25 PROCEDURE — 88175 CYTOPATH C/V AUTO FLUID REDO: CPT

## 2021-05-25 PROCEDURE — 99000 SPECIMEN HANDLING OFFICE-LAB: CPT | Performed by: FAMILY MEDICINE

## 2021-05-25 PROCEDURE — 87624 HPV HI-RISK TYP POOLED RSLT: CPT

## 2021-05-25 PROCEDURE — 99396 PREV VISIT EST AGE 40-64: CPT | Mod: 25 | Performed by: FAMILY MEDICINE

## 2021-05-25 ASSESSMENT — PATIENT HEALTH QUESTIONNAIRE - PHQ9: CLINICAL INTERPRETATION OF PHQ2 SCORE: 0

## 2021-05-25 ASSESSMENT — FIBROSIS 4 INDEX: FIB4 SCORE: 0.92

## 2021-05-25 NOTE — PROGRESS NOTES
Subjective:      Martha Oliver is a 61 y.o. female who presents with Annual Exam (pap and gyn) and Medication Refill (Adalat)            HPI     Patient is here for routine GYN exam/Pap smear.  Her last Pap smear was in 2017 that came back no evidence of malignancy and negative HPV.  Patient is  3 para 3.  No history of abnormal Pap smears.  No history of STDs.  She is postmenopausal.  The last mammogram was in 2020 that came back no evidence of malignancy.  Up-to-date with Tdap, she completed her COVID-19 vaccination.  She is due for Shingrix.  Her last colonoscopy was in  that showed internal and external hemorrhoids with another colonoscopy recommended in 10 years.    She is also here for follow-up of hypertension.  Blood pressure is under control on nifedipine.    She continues to take simvastatin for dyslipidemia without myalgias.  Blood work was done before this visit.    She continues to manage her impaired fasting blood glucose by watching her diet.  She also did follow-up blood work before this visit.    For her Castleman's disease, she had a recent PET scan in 2021 that showed stable right external iliac lymph nodes and mild increase in activity within multiple bilateral jugular chain lymph nodes indicating disease progression.  Mild SUVs are similar.  She thinks that the increase in the activity in the jugular chain of lymph nodes may be from the Covid shot that she got observe low-salt diet exercise and weight loss 2021.  Her hematologist/oncologist Dr. Dallas will see her in 6 months and we will do another PET scan in 8 months.    I reviewed the following    Past Medical History:   Diagnosis Date   • Ataxia     no neurologic etiology found   • Dysarthria     no neurologic etiology found   • Dyslipidemia    • HTN (hypertension)    • Hypertension         Past Surgical History:   Procedure Laterality Date   • LYMPH NODE EXCISION  2014    Performed by Ирина  LYNDON Arcos M.D. at SURGERY SAME DAY [facility]   • COLONOSCOPY  9/14    internal and external hemorrhoids   • TUBAL COAGULATION LAPAROSCOPIC BILATERAL         No Known Allergies    Current Outpatient Medications   Medication Sig Dispense Refill   • simvastatin (ZOCOR) 20 MG Tab TAKE 1 TABLET BY MOUTH ONCE DAILY IN THE EVENING 90 tablet 1   • NIFEdipine (ADALAT CC) 60 MG CR tablet Take 1 tablet by mouth once daily 90 tablet 1   • Omega-3 Fatty Acids (FISH OIL) 1000 MG CAPS capsule Take 1,000 mg by mouth 3 times a day, with meals.     • ascorbic acid (ASCORBIC ACID) 500 MG TABS Take 500 mg by mouth every day.     • vitamin D (CHOLECALCIFEROL) 1000 UNIT TABS Take 2,000 Units by mouth every day.       No current facility-administered medications for this visit.        Family History   Problem Relation Age of Onset   • Hypertension Mother    • Hyperlipidemia Mother    • Hypertension Sister    • Hyperlipidemia Sister    • Hypertension Sister    • Hyperlipidemia Sister        Social History     Socioeconomic History   • Marital status:      Spouse name: Not on file   • Number of children: 3   • Years of education: Not on file   • Highest education level: Not on file   Occupational History   • Occupation: sample handling     Employer: [employer]   Tobacco Use   • Smoking status: Never Smoker   • Smokeless tobacco: Never Used   Vaping Use   • Vaping Use: Never used   Substance and Sexual Activity   • Alcohol use: Yes     Alcohol/week: 0.0 oz     Comment: occasional   • Drug use: No   • Sexual activity: Yes     Partners: Male     Birth control/protection: Surgical   Other Topics Concern   • Not on file   Social History Narrative   • Not on file     Social Determinants of Health     Financial Resource Strain:    • Difficulty of Paying Living Expenses:    Food Insecurity:    • Worried About Running Out of Food in the Last Year:    • Ran Out of Food in the Last Year:    Transportation Needs:    • Lack of Transportation  "(Medical):    • Lack of Transportation (Non-Medical):    Physical Activity:    • Days of Exercise per Week:    • Minutes of Exercise per Session:    Stress:    • Feeling of Stress :    Social Connections:    • Frequency of Communication with Friends and Family:    • Frequency of Social Gatherings with Friends and Family:    • Attends [employer] Services:    • Active Member of Clubs or Organizations:    • Attends Club or Organization Meetings:    • Marital Status:    Intimate Partner Violence:    • Fear of Current or Ex-Partner:    • Emotionally Abused:    • Physically Abused:    • Sexually Abused:         ROS     As per HPI, the rest are negative.     Objective:     /70 (BP Location: Left arm, Patient Position: Sitting, BP Cuff Size: Adult)   Pulse 70   Temp 36.3 °C (97.4 °F) (Temporal)   Ht 1.549 m (5' 1\")   Wt 49 kg (108 lb 0.4 oz)   SpO2 98%   BMI 20.41 kg/m²      Physical Exam  Constitutional:       General: She is not in acute distress.     Appearance: She is well-developed. She is not diaphoretic.   HENT:      Head: Normocephalic and atraumatic.      Right Ear: External ear normal.      Left Ear: External ear normal.      Nose: Nose normal.      Mouth/Throat:      Pharynx: No oropharyngeal exudate.   Eyes:      General: No scleral icterus.        Right eye: No discharge.         Left eye: No discharge.      Conjunctiva/sclera: Conjunctivae normal.      Pupils: Pupils are equal, round, and reactive to light.   Neck:      Thyroid: No thyromegaly.      Trachea: No tracheal deviation.   Cardiovascular:      Rate and Rhythm: Normal rate and regular rhythm.      Heart sounds: Normal heart sounds. No murmur heard.   No gallop.    Pulmonary:      Effort: Pulmonary effort is normal. No respiratory distress.      Breath sounds: Normal breath sounds. No stridor. No wheezing or rales.   Chest:      Breasts: Breasts are symmetrical.         Right: No inverted nipple, mass, nipple discharge, skin change or " tenderness.         Left: No inverted nipple, mass, nipple discharge, skin change or tenderness.   Abdominal:      General: Bowel sounds are normal. There is no distension.      Palpations: Abdomen is soft. There is no mass.      Tenderness: There is no abdominal tenderness. There is no guarding or rebound.      Hernia: There is no hernia in the left inguinal area or right inguinal area.   Genitourinary:     General: Normal vulva.      Exam position: Supine.      Labia:         Right: No rash.         Left: No rash.       Vagina: Normal. No vaginal discharge.      Cervix: No cervical motion tenderness, discharge or friability.      Uterus: Not deviated, not enlarged, not fixed and not tender.       Adnexa:         Right: No mass, tenderness or fullness.          Left: No mass, tenderness or fullness.        Rectum: Normal. Guaiac result negative. No mass, tenderness, anal fissure, external hemorrhoid or internal hemorrhoid. Normal anal tone.   Musculoskeletal:         General: No tenderness. Normal range of motion.      Cervical back: Normal range of motion and neck supple.   Lymphadenopathy:      Cervical: No cervical adenopathy.   Skin:     General: Skin is warm.      Coloration: Skin is not pale.      Findings: No erythema or rash.   Neurological:      Mental Status: She is alert and oriented to person, place, and time.      Cranial Nerves: No cranial nerve deficit.      Motor: No abnormal muscle tone.      Coordination: Coordination normal.      Deep Tendon Reflexes: Reflexes normal.   Psychiatric:         Behavior: Behavior normal.         Thought Content: Thought content normal.          Hospital Outpatient Visit on 05/22/2021   Component Date Value Ref Range Status   • Cholesterol,Tot 05/22/2021 141  100 - 199 mg/dL Final   • Triglycerides 05/22/2021 82  0 - 149 mg/dL Final   • HDL 05/22/2021 59  >=40 mg/dL Final   • LDL 05/22/2021 66  <100 mg/dL Final   • Sodium 05/22/2021 142  135 - 145 mmol/L Final   •  Potassium 05/22/2021 4.5  3.6 - 5.5 mmol/L Final   • Chloride 05/22/2021 103  96 - 112 mmol/L Final   • Co2 05/22/2021 28  20 - 33 mmol/L Final   • Anion Gap 05/22/2021 11.0  7.0 - 16.0 Final   • Glucose 05/22/2021 103* 65 - 99 mg/dL Final   • Bun 05/22/2021 21  8 - 22 mg/dL Final   • Creatinine 05/22/2021 0.50  0.50 - 1.40 mg/dL Final   • Calcium 05/22/2021 9.6  8.5 - 10.5 mg/dL Final   • AST(SGOT) 05/22/2021 23  12 - 45 U/L Final   • ALT(SGPT) 05/22/2021 24  2 - 50 U/L Final   • Alkaline Phosphatase 05/22/2021 159* 30 - 99 U/L Final   • Total Bilirubin 05/22/2021 0.6  0.1 - 1.5 mg/dL Final   • Albumin 05/22/2021 4.9  3.2 - 4.9 g/dL Final   • Total Protein 05/22/2021 8.8* 6.0 - 8.2 g/dL Final   • Globulin 05/22/2021 3.9* 1.9 - 3.5 g/dL Final   • A-G Ratio 05/22/2021 1.3  g/dL Final   • Glycohemoglobin 05/22/2021 6.0* 4.0 - 5.6 % Final    Comment: Increased risk for diabetes:  5.7 -6.4%  Diabetes:  >6.4%  Glycemic control for adults with diabetes:  <7.0%    The above interpretations are per ADA guidelines.  Diagnosis  of diabetes mellitus on the basis of elevated Hemoglobin A1c  should be confirmed by repeating the Hb A1c test.     • Est Avg Glucose 05/22/2021 126  mg/dL Final    Comment: The eAG calculation is based on the A1c-Derived Daily Glucose  (ADAG) study.  See the ADA's website for additional information.     • WBC 05/22/2021 4.4* 4.8 - 10.8 K/uL Final   • RBC 05/22/2021 5.27  4.20 - 5.40 M/uL Final   • Hemoglobin 05/22/2021 15.9  12.0 - 16.0 g/dL Final   • Hematocrit 05/22/2021 47.9* 37.0 - 47.0 % Final   • MCV 05/22/2021 90.9  81.4 - 97.8 fL Final   • MCH 05/22/2021 30.2  27.0 - 33.0 pg Final   • MCHC 05/22/2021 33.2* 33.6 - 35.0 g/dL Final   • RDW 05/22/2021 42.9  35.9 - 50.0 fL Final   • Platelet Count 05/22/2021 300  164 - 446 K/uL Final   • MPV 05/22/2021 9.9  9.0 - 12.9 fL Final   • Neutrophils-Polys 05/22/2021 52.60  44.00 - 72.00 % Final   • Lymphocytes 05/22/2021 31.80  22.00 - 41.00 % Final   •  Monocytes 05/22/2021 10.20  0.00 - 13.40 % Final   • Eosinophils 05/22/2021 4.10  0.00 - 6.90 % Final   • Basophils 05/22/2021 1.10  0.00 - 1.80 % Final   • Immature Granulocytes 05/22/2021 0.20  0.00 - 0.90 % Final   • Nucleated RBC 05/22/2021 0.00  /100 WBC Final   • Neutrophils (Absolute) 05/22/2021 2.31  2.00 - 7.15 K/uL Final    Includes immature neutrophils, if present.   • Lymphs (Absolute) 05/22/2021 1.40  1.00 - 4.80 K/uL Final   • Monos (Absolute) 05/22/2021 0.45  0.00 - 0.85 K/uL Final   • Eos (Absolute) 05/22/2021 0.18  0.00 - 0.51 K/uL Final   • Baso (Absolute) 05/22/2021 0.05  0.00 - 0.12 K/uL Final   • Immature Granulocytes (abs) 05/22/2021 0.01  0.00 - 0.11 K/uL Final   • NRBC (Absolute) 05/22/2021 0.00  K/uL Final   • Fasting Status 05/22/2021 Fasting   Final   • GFR If  05/22/2021 >60  >60 mL/min/1.73 m 2 Final   • GFR If Non  05/22/2021 >60  >60 mL/min/1.73 m 2 Final                        Assessment/Plan:        1. Encounter for routine gynecological examination with Papanicolaou smear of cervix  Complete exam was done.  Pap smear was done.  Specimen is packaged and sent to the lab.  She will need her screening mammogram in August 2020.  Up-to-date with Tdap.  She will need to get her flu shot this fall.  - THINPREP PAP WITH HPV; Future    2. Screening for human papillomavirus (HPV)  Screening HPV was done.  - THINPREP PAP WITH HPV; Future    3. Essential hypertension  Controlled on nifedipine.  - Lipid Profile; Future  - Comp Metabolic Panel; Future  - HEMOGLOBIN A1C; Future    4. Dyslipidemia  All at target on simvastatin.  - Lipid Profile; Future  - Comp Metabolic Panel; Future  - HEMOGLOBIN A1C; Future    5. Impaired fasting blood sugar  Fasting blood glucose still borderline elevated including the hemoglobin A1c.  She will continue to manage this with her own efforts.  - Lipid Profile; Future  - Comp Metabolic Panel; Future  - HEMOGLOBIN A1C; Future    6.  Castleman disease (HCC)  Her most recent PET scan showed mild increase in activity in the jugular chain of nodes but SUVs are the same.  She is asymptomatic.  It is possible that the increase in activity is due to the recent Covid vaccine.  Her hematologist/oncologist does not think there is progression of her disease.  She has a follow-up appointment in 6 months and she will do another PET scan in 8 months.  We will follow along.    7. Need for shingles vaccine  First dose of Shingrix given.  Discussed potential side effects and how she can manage them.  We will give the second dose in 6 months.  - Shingles Vaccine (Shingrix)    Follow-up in 6 months.      Please note that this dictation was created using voice recognition software. I have worked with consultants from the vendor as well as technical experts from [facility] to optimize the interface. I have made every reasonable attempt to correct obvious errors, but I expect that there are errors of grammar and possibly content I did not discover before finalizing the note.

## 2021-08-24 ENCOUNTER — HOSPITAL ENCOUNTER (OUTPATIENT)
Dept: RADIOLOGY | Facility: MEDICAL CENTER | Age: 61
End: 2021-08-24
Attending: FAMILY MEDICINE
Payer: COMMERCIAL

## 2021-08-24 DIAGNOSIS — Z12.31 VISIT FOR SCREENING MAMMOGRAM: ICD-10-CM

## 2021-08-24 PROCEDURE — 77063 BREAST TOMOSYNTHESIS BI: CPT

## 2021-08-25 DIAGNOSIS — I10 ESSENTIAL HYPERTENSION: ICD-10-CM

## 2021-09-21 ENCOUNTER — IMMUNIZATION (OUTPATIENT)
Dept: OCCUPATIONAL MEDICINE | Facility: CLINIC | Age: 61
End: 2021-09-21
Payer: COMMERCIAL

## 2021-09-21 DIAGNOSIS — Z23 NEED FOR VACCINATION: Primary | ICD-10-CM

## 2021-09-21 PROCEDURE — 90686 IIV4 VACC NO PRSV 0.5 ML IM: CPT | Performed by: PREVENTIVE MEDICINE

## 2021-09-22 ENCOUNTER — HOSPITAL ENCOUNTER (OUTPATIENT)
Facility: MEDICAL CENTER | Age: 61
End: 2021-09-22
Attending: NURSE PRACTITIONER
Payer: COMMERCIAL

## 2021-09-22 ENCOUNTER — EH NON-PROVIDER (OUTPATIENT)
Dept: OCCUPATIONAL MEDICINE | Facility: CLINIC | Age: 61
End: 2021-09-22
Payer: COMMERCIAL

## 2021-09-22 DIAGNOSIS — Z02.89 ENCOUNTER FOR OCCUPATIONAL HEALTH ASSESSMENT: ICD-10-CM

## 2021-09-22 PROCEDURE — 86480 TB TEST CELL IMMUN MEASURE: CPT | Performed by: PREVENTIVE MEDICINE

## 2021-09-23 LAB
GAMMA INTERFERON BACKGROUND BLD IA-ACNC: 0.06 IU/ML
M TB IFN-G BLD-IMP: NEGATIVE
M TB IFN-G CD4+ BCKGRND COR BLD-ACNC: 0.02 IU/ML
MITOGEN IGNF BCKGRD COR BLD-ACNC: >10 IU/ML
QFT TB2 - NIL TBQ2: -0.01 IU/ML

## 2021-09-29 ENCOUNTER — IMMUNIZATION (OUTPATIENT)
Dept: OCCUPATIONAL MEDICINE | Facility: CLINIC | Age: 61
End: 2021-09-29
Payer: COMMERCIAL

## 2021-09-29 DIAGNOSIS — Z23 ENCOUNTER FOR VACCINATION: Primary | ICD-10-CM

## 2021-09-29 PROCEDURE — 91300 PFIZER SARS-COV-2 VACCINE: CPT | Performed by: INTERNAL MEDICINE

## 2021-09-29 PROCEDURE — 0003A PFIZER SARS-COV-2 VACCINE: CPT | Performed by: INTERNAL MEDICINE

## 2021-11-13 ENCOUNTER — HOSPITAL ENCOUNTER (OUTPATIENT)
Dept: LAB | Facility: MEDICAL CENTER | Age: 61
End: 2021-11-13
Attending: FAMILY MEDICINE
Payer: COMMERCIAL

## 2021-11-13 DIAGNOSIS — E78.5 DYSLIPIDEMIA: ICD-10-CM

## 2021-11-13 DIAGNOSIS — I10 ESSENTIAL HYPERTENSION: ICD-10-CM

## 2021-11-13 DIAGNOSIS — R73.01 IMPAIRED FASTING GLUCOSE: ICD-10-CM

## 2021-11-13 LAB
ALBUMIN SERPL BCP-MCNC: 4.8 G/DL (ref 3.2–4.9)
ALBUMIN/GLOB SERPL: 1.2 G/DL
ALP SERPL-CCNC: 186 U/L (ref 30–99)
ALT SERPL-CCNC: 15 U/L (ref 2–50)
ANION GAP SERPL CALC-SCNC: 10 MMOL/L (ref 7–16)
AST SERPL-CCNC: 20 U/L (ref 12–45)
BILIRUB SERPL-MCNC: 0.3 MG/DL (ref 0.1–1.5)
BUN SERPL-MCNC: 13 MG/DL (ref 8–22)
CALCIUM SERPL-MCNC: 8.6 MG/DL (ref 8.5–10.5)
CHLORIDE SERPL-SCNC: 104 MMOL/L (ref 96–112)
CHOLEST SERPL-MCNC: 143 MG/DL (ref 100–199)
CO2 SERPL-SCNC: 27 MMOL/L (ref 20–33)
CREAT SERPL-MCNC: 0.43 MG/DL (ref 0.5–1.4)
EST. AVERAGE GLUCOSE BLD GHB EST-MCNC: 114 MG/DL
FASTING STATUS PATIENT QL REPORTED: NORMAL
GLOBULIN SER CALC-MCNC: 4 G/DL (ref 1.9–3.5)
GLUCOSE SERPL-MCNC: 111 MG/DL (ref 65–99)
HBA1C MFR BLD: 5.6 % (ref 4–5.6)
HDLC SERPL-MCNC: 53 MG/DL
LDLC SERPL CALC-MCNC: 72 MG/DL
POTASSIUM SERPL-SCNC: 4 MMOL/L (ref 3.6–5.5)
PROT SERPL-MCNC: 8.8 G/DL (ref 6–8.2)
SODIUM SERPL-SCNC: 141 MMOL/L (ref 135–145)
TRIGL SERPL-MCNC: 90 MG/DL (ref 0–149)

## 2021-11-13 PROCEDURE — 80053 COMPREHEN METABOLIC PANEL: CPT

## 2021-11-13 PROCEDURE — 36415 COLL VENOUS BLD VENIPUNCTURE: CPT

## 2021-11-13 PROCEDURE — 80061 LIPID PANEL: CPT

## 2021-11-13 PROCEDURE — 83036 HEMOGLOBIN GLYCOSYLATED A1C: CPT

## 2021-11-16 ENCOUNTER — HOSPITAL ENCOUNTER (OUTPATIENT)
Dept: RADIOLOGY | Facility: MEDICAL CENTER | Age: 61
End: 2021-11-16
Attending: INTERNAL MEDICINE
Payer: COMMERCIAL

## 2021-11-16 DIAGNOSIS — D47.Z2 ANGIOLYMPHOID HYPERPLASIA (HCC): ICD-10-CM

## 2021-11-16 DIAGNOSIS — D47.Z2 UNICENTRIC CASTLEMAN DISEASE (HCC): ICD-10-CM

## 2021-11-16 PROCEDURE — A9552 F18 FDG: HCPCS

## 2021-12-06 ENCOUNTER — OFFICE VISIT (OUTPATIENT)
Dept: MEDICAL GROUP | Facility: PHYSICIAN GROUP | Age: 61
End: 2021-12-06
Payer: COMMERCIAL

## 2021-12-06 VITALS
DIASTOLIC BLOOD PRESSURE: 60 MMHG | HEIGHT: 61 IN | BODY MASS INDEX: 21.02 KG/M2 | WEIGHT: 111.33 LBS | OXYGEN SATURATION: 98 % | TEMPERATURE: 97.8 F | HEART RATE: 78 BPM | SYSTOLIC BLOOD PRESSURE: 130 MMHG

## 2021-12-06 DIAGNOSIS — E78.5 DYSLIPIDEMIA: ICD-10-CM

## 2021-12-06 DIAGNOSIS — D47.Z2 CASTLEMAN DISEASE (HCC): ICD-10-CM

## 2021-12-06 DIAGNOSIS — Z23 NEED FOR SHINGLES VACCINE: ICD-10-CM

## 2021-12-06 DIAGNOSIS — I10 ESSENTIAL HYPERTENSION: ICD-10-CM

## 2021-12-06 DIAGNOSIS — R73.01 IMPAIRED FASTING GLUCOSE: ICD-10-CM

## 2021-12-06 PROCEDURE — 99214 OFFICE O/P EST MOD 30 MIN: CPT | Mod: 25 | Performed by: FAMILY MEDICINE

## 2021-12-06 PROCEDURE — 90750 HZV VACC RECOMBINANT IM: CPT | Performed by: FAMILY MEDICINE

## 2021-12-06 PROCEDURE — 90471 IMMUNIZATION ADMIN: CPT | Performed by: FAMILY MEDICINE

## 2021-12-06 ASSESSMENT — FIBROSIS 4 INDEX: FIB4 SCORE: 1.02

## 2021-12-06 NOTE — PROGRESS NOTES
"Subjective     Martha Oliver is a 61 y.o. female who presents with Hypertension and Medication Refill (nifedipine)            HPI     Patient is here for follow-up of her medical problems.    In terms of her hypertension, this is still under control on nifedipine without side effects.    For the dyslipidemia, she continues to take simvastatin without myalgias.  Blood work was done before this visit.    We continue to follow her for impaired fasting blood sugar and she manages this by watching her diet.    For her Castleman's disease, she did a follow-up PET scan last month that showed interval progression of disease with increased metabolic activity in the cervical and right pelvic/inguinal lymph nodes but similar size to prior exam.  Focal activity in the right colon likely physiologic/inflammatory with no correlating Lokshin appreciated on CT.  She said she saw Dr. Dallas, hematologist/oncologist end of November who told her that he did not think there is true progression of disease and may be just a difference in the reading because they have a new machine.  Patient has no palpable lymph nodes in her neck or in her inguinal areas or axillary areas.  She has no symptoms of weight loss, sweating, fever, body aches or pains.    She needs her second dose of Shingrix today.    Past medical history, past surgical history, family history reviewed-no changes    Social history reviewed-no changes    Allergies reviewed-no changes    Medications reviewed-no changes    ROS     As per HPI, the rest are negative.           Objective     /60 (BP Location: Left arm, Patient Position: Sitting, BP Cuff Size: Adult)   Pulse 78   Temp 36.6 °C (97.8 °F) (Temporal)   Ht 1.549 m (5' 1\")   Wt 50.5 kg (111 lb 5.3 oz)   SpO2 98%   BMI 21.04 kg/m²      Physical Exam     Examined alert, awake, oriented, not in distress    Neck-supple, no lymphadenopathy, no thyromegaly  Lungs-clear to auscultation, no rales, no " wheezes  Heart-regular rate and rhythm, no murmur  Extremities-no edema, clubbing, cyanosis             Hospital Outpatient Visit on 11/13/2021   Component Date Value Ref Range Status   • Glycohemoglobin 11/13/2021 5.6  4.0 - 5.6 % Final    Comment: Increased risk for diabetes:  5.7 -6.4%  Diabetes:  >6.4%  Glycemic control for adults with diabetes:  <7.0%    The above interpretations are per ADA guidelines.  Diagnosis  of diabetes mellitus on the basis of elevated Hemoglobin A1c  should be confirmed by repeating the Hb A1c test.     • Est Avg Glucose 11/13/2021 114  mg/dL Final    Comment: The eAG calculation is based on the A1c-Derived Daily Glucose  (ADAG) study.  See the ADA's website for additional information.     • Sodium 11/13/2021 141  135 - 145 mmol/L Final   • Potassium 11/13/2021 4.0  3.6 - 5.5 mmol/L Final   • Chloride 11/13/2021 104  96 - 112 mmol/L Final   • Co2 11/13/2021 27  20 - 33 mmol/L Final   • Anion Gap 11/13/2021 10.0  7.0 - 16.0 Final   • Glucose 11/13/2021 111* 65 - 99 mg/dL Final   • Bun 11/13/2021 13  8 - 22 mg/dL Final   • Creatinine 11/13/2021 0.43* 0.50 - 1.40 mg/dL Final   • Calcium 11/13/2021 8.6  8.5 - 10.5 mg/dL Final   • AST(SGOT) 11/13/2021 20  12 - 45 U/L Final   • ALT(SGPT) 11/13/2021 15  2 - 50 U/L Final   • Alkaline Phosphatase 11/13/2021 186* 30 - 99 U/L Final   • Total Bilirubin 11/13/2021 0.3  0.1 - 1.5 mg/dL Final   • Albumin 11/13/2021 4.8  3.2 - 4.9 g/dL Final   • Total Protein 11/13/2021 8.8* 6.0 - 8.2 g/dL Final   • Globulin 11/13/2021 4.0* 1.9 - 3.5 g/dL Final   • A-G Ratio 11/13/2021 1.2  g/dL Final   • Cholesterol,Tot 11/13/2021 143  100 - 199 mg/dL Final   • Triglycerides 11/13/2021 90  0 - 149 mg/dL Final   • HDL 11/13/2021 53  >=40 mg/dL Final   • LDL 11/13/2021 72  <100 mg/dL Final   • Fasting Status 11/13/2021 Fasting   Final   • GFR If  11/13/2021 >60  >60 mL/min/1.73 m 2 Final   • GFR If Non  11/13/2021 >60  >60 mL/min/1.73 m  2 Final                  Assessment & Plan        1. Essential hypertension  Controlled on nifedipine.  Continue medication.  - Lipid Profile; Future  - Comp Metabolic Panel; Future  - HEMOGLOBIN A1C; Future  - CBC WITH DIFFERENTIAL; Future  - NIFEdipine (ADALAT CC) 60 MG CR tablet; Take 1 Tablet by mouth every day.  Dispense: 90 Tablet; Refill: 1    2. Dyslipidemia  All at goal.  Continue cholesterol medication.  - Lipid Profile; Future  - Comp Metabolic Panel; Future  - HEMOGLOBIN A1C; Future  - CBC WITH DIFFERENTIAL; Future    3. Impaired fasting blood sugar  Fasting blood sugar borderline elevated but hemoglobin A1c is normal.  Continue avoidance of sweets and decreasing the carbs.  - Lipid Profile; Future  - Comp Metabolic Panel; Future  - HEMOGLOBIN A1C; Future  - CBC WITH DIFFERENTIAL; Future    4. Castleman disease (HCC)  Her most recent PET scan came back findings consistent with progression of disease but her hematologist/oncologist for otherwise as patient without symptoms or palpable lymph nodes and he thought this may just be difference in the reading because of new machine.  We will have the visit notes and I will await notes that she was just seen last week.  She said she has an appointment for follow-up with Dr. Dallas in March 2022.  - Lipid Profile; Future  - Comp Metabolic Panel; Future  - HEMOGLOBIN A1C; Future  - CBC WITH DIFFERENTIAL; Future    5. Need for shingles vaccine  Second dose of Shingrix was given.  - Shingles Vaccine (Shingrix)    Follow-up in 6 months or sooner if needed.      Please note that this dictation was created using voice recognition software. I have worked with consultants from the vendor as well as technical experts from [facility] to optimize the interface. I have made every reasonable attempt to correct obvious errors, but I expect that there are errors of grammar and possibly content I did not discover before finalizing the note.

## 2022-01-13 ENCOUNTER — EH NON-PROVIDER (OUTPATIENT)
Dept: OCCUPATIONAL MEDICINE | Facility: CLINIC | Age: 62
End: 2022-01-13
Payer: COMMERCIAL

## 2022-01-13 DIAGNOSIS — Z02.89 ENCOUNTER FOR OCCUPATIONAL HEALTH EXAMINATION INVOLVING RESPIRATOR: ICD-10-CM

## 2022-01-13 PROCEDURE — 94375 RESPIRATORY FLOW VOLUME LOOP: CPT | Performed by: PREVENTIVE MEDICINE

## 2022-02-22 DIAGNOSIS — I10 ESSENTIAL HYPERTENSION: ICD-10-CM

## 2022-06-11 ENCOUNTER — HOSPITAL ENCOUNTER (OUTPATIENT)
Dept: LAB | Facility: MEDICAL CENTER | Age: 62
End: 2022-06-11
Attending: FAMILY MEDICINE
Payer: COMMERCIAL

## 2022-06-11 DIAGNOSIS — I10 ESSENTIAL HYPERTENSION: ICD-10-CM

## 2022-06-11 DIAGNOSIS — D47.Z2 CASTLEMAN DISEASE (HCC): ICD-10-CM

## 2022-06-11 DIAGNOSIS — E78.5 DYSLIPIDEMIA: ICD-10-CM

## 2022-06-11 DIAGNOSIS — R73.01 IMPAIRED FASTING GLUCOSE: ICD-10-CM

## 2022-06-11 LAB
ALBUMIN SERPL BCP-MCNC: 4.8 G/DL (ref 3.2–4.9)
ALBUMIN/GLOB SERPL: 1.3 G/DL
ALP SERPL-CCNC: 177 U/L (ref 30–99)
ALT SERPL-CCNC: 21 U/L (ref 2–50)
ANION GAP SERPL CALC-SCNC: 8 MMOL/L (ref 7–16)
AST SERPL-CCNC: 28 U/L (ref 12–45)
BASOPHILS # BLD AUTO: 1.5 % (ref 0–1.8)
BASOPHILS # BLD: 0.06 K/UL (ref 0–0.12)
BILIRUB SERPL-MCNC: 0.5 MG/DL (ref 0.1–1.5)
BUN SERPL-MCNC: 12 MG/DL (ref 8–22)
CALCIUM SERPL-MCNC: 9.4 MG/DL (ref 8.5–10.5)
CHLORIDE SERPL-SCNC: 101 MMOL/L (ref 96–112)
CHOLEST SERPL-MCNC: 139 MG/DL (ref 100–199)
CO2 SERPL-SCNC: 29 MMOL/L (ref 20–33)
CREAT SERPL-MCNC: 0.51 MG/DL (ref 0.5–1.4)
EOSINOPHIL # BLD AUTO: 0.17 K/UL (ref 0–0.51)
EOSINOPHIL NFR BLD: 4.1 % (ref 0–6.9)
ERYTHROCYTE [DISTWIDTH] IN BLOOD BY AUTOMATED COUNT: 42.3 FL (ref 35.9–50)
EST. AVERAGE GLUCOSE BLD GHB EST-MCNC: 126 MG/DL
FASTING STATUS PATIENT QL REPORTED: NORMAL
GFR SERPLBLD CREATININE-BSD FMLA CKD-EPI: 107 ML/MIN/1.73 M 2
GLOBULIN SER CALC-MCNC: 3.8 G/DL (ref 1.9–3.5)
GLUCOSE SERPL-MCNC: 116 MG/DL (ref 65–99)
HBA1C MFR BLD: 6 % (ref 4–5.6)
HCT VFR BLD AUTO: 45.7 % (ref 37–47)
HDLC SERPL-MCNC: 44 MG/DL
HGB BLD-MCNC: 15.1 G/DL (ref 12–16)
IMM GRANULOCYTES # BLD AUTO: 0 K/UL (ref 0–0.11)
IMM GRANULOCYTES NFR BLD AUTO: 0 % (ref 0–0.9)
LDLC SERPL CALC-MCNC: 74 MG/DL
LYMPHOCYTES # BLD AUTO: 1.56 K/UL (ref 1–4.8)
LYMPHOCYTES NFR BLD: 38 % (ref 22–41)
MCH RBC QN AUTO: 29.5 PG (ref 27–33)
MCHC RBC AUTO-ENTMCNC: 33 G/DL (ref 33.6–35)
MCV RBC AUTO: 89.4 FL (ref 81.4–97.8)
MONOCYTES # BLD AUTO: 0.56 K/UL (ref 0–0.85)
MONOCYTES NFR BLD AUTO: 13.6 % (ref 0–13.4)
NEUTROPHILS # BLD AUTO: 1.76 K/UL (ref 2–7.15)
NEUTROPHILS NFR BLD: 42.8 % (ref 44–72)
NRBC # BLD AUTO: 0 K/UL
NRBC BLD-RTO: 0 /100 WBC
PLATELET # BLD AUTO: 314 K/UL (ref 164–446)
PMV BLD AUTO: 9.7 FL (ref 9–12.9)
POTASSIUM SERPL-SCNC: 4 MMOL/L (ref 3.6–5.5)
PROT SERPL-MCNC: 8.6 G/DL (ref 6–8.2)
RBC # BLD AUTO: 5.11 M/UL (ref 4.2–5.4)
SODIUM SERPL-SCNC: 138 MMOL/L (ref 135–145)
TRIGL SERPL-MCNC: 105 MG/DL (ref 0–149)
WBC # BLD AUTO: 4.1 K/UL (ref 4.8–10.8)

## 2022-06-11 PROCEDURE — 36415 COLL VENOUS BLD VENIPUNCTURE: CPT

## 2022-06-11 PROCEDURE — 83036 HEMOGLOBIN GLYCOSYLATED A1C: CPT

## 2022-06-11 PROCEDURE — 85025 COMPLETE CBC W/AUTO DIFF WBC: CPT

## 2022-06-11 PROCEDURE — 80053 COMPREHEN METABOLIC PANEL: CPT

## 2022-06-11 PROCEDURE — 80061 LIPID PANEL: CPT

## 2022-07-06 ENCOUNTER — OFFICE VISIT (OUTPATIENT)
Dept: MEDICAL GROUP | Facility: PHYSICIAN GROUP | Age: 62
End: 2022-07-06
Payer: COMMERCIAL

## 2022-07-06 VITALS
SYSTOLIC BLOOD PRESSURE: 110 MMHG | HEIGHT: 61 IN | OXYGEN SATURATION: 97 % | HEART RATE: 88 BPM | TEMPERATURE: 98.1 F | WEIGHT: 112.21 LBS | BODY MASS INDEX: 21.19 KG/M2 | DIASTOLIC BLOOD PRESSURE: 50 MMHG

## 2022-07-06 DIAGNOSIS — E78.5 DYSLIPIDEMIA: ICD-10-CM

## 2022-07-06 DIAGNOSIS — D47.Z2 CASTLEMAN DISEASE (HCC): ICD-10-CM

## 2022-07-06 DIAGNOSIS — R73.01 IMPAIRED FASTING GLUCOSE: ICD-10-CM

## 2022-07-06 DIAGNOSIS — I10 ESSENTIAL HYPERTENSION: ICD-10-CM

## 2022-07-06 PROCEDURE — 99214 OFFICE O/P EST MOD 30 MIN: CPT | Performed by: FAMILY MEDICINE

## 2022-07-06 RX ORDER — SIMVASTATIN 20 MG
20 TABLET ORAL EVERY EVENING
Qty: 90 TABLET | Refills: 3 | Status: SHIPPED | OUTPATIENT
Start: 2022-07-06 | End: 2023-02-23 | Stop reason: SDUPTHER

## 2022-07-06 ASSESSMENT — FIBROSIS 4 INDEX: FIB4 SCORE: 1.17

## 2022-07-06 ASSESSMENT — PATIENT HEALTH QUESTIONNAIRE - PHQ9: CLINICAL INTERPRETATION OF PHQ2 SCORE: 0

## 2022-07-06 NOTE — PROGRESS NOTES
"Subjective     Martha Oliver is a 62 y.o. female who presents with Hypertension and Medication Refill (Nifedipine, simvastatin)            HPI     Patient is here for follow-up of her medical problems.    In terms of hypertension, she continues to take nifedipine with good control of the blood pressure.    For her dyslipidemia, she continues to take simvastatin without myalgias.    We follow her for impaired fasting blood sugar and she is managing this by watching the diet.    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 Dallas.  Her last PET scan in November 2021 showed increase in uptake but with stable size of the lymph nodes.  Patient without any B symptoms.  Dr. Dallas did not think she had progression of the disease.  She will have another PET scan in January.    Past medical history, past surgical history, family history reviewed-no changes    Social history reviewed-no changes    Allergies reviewed-no changes    Medications reviewed-no changes    ROS     As per HPI, the rest are negative.           Objective     /50 (BP Location: Left arm, Patient Position: Sitting, BP Cuff Size: Adult)   Pulse 88   Temp 36.7 °C (98.1 °F) (Temporal)   Ht 1.549 m (5' 1\")   Wt 50.9 kg (112 lb 3.4 oz)   SpO2 97%   BMI 21.20 kg/m²      Physical Exam     Examined alert, awake, oriented, not in distress    Neck-supple, no lymphadenopathy, no thyromegaly  Lungs-clear to auscultation, no rales, no wheezes  Heart-regular rate and rhythm, no murmur  Extremities-no edema, clubbing, cyanosis          Hospital Outpatient Visit on 06/11/2022   Component Date Value Ref Range Status   • WBC 06/11/2022 4.1 (A) 4.8 - 10.8 K/uL Final   • RBC 06/11/2022 5.11  4.20 - 5.40 M/uL Final   • Hemoglobin 06/11/2022 15.1  12.0 - 16.0 g/dL Final   • Hematocrit 06/11/2022 45.7  37.0 - 47.0 % Final   • MCV 06/11/2022 89.4  81.4 - 97.8 fL Final   • MCH 06/11/2022 29.5  27.0 - 33.0 pg Final   • MCHC " 06/11/2022 33.0 (A) 33.6 - 35.0 g/dL Final   • RDW 06/11/2022 42.3  35.9 - 50.0 fL Final   • Platelet Count 06/11/2022 314  164 - 446 K/uL Final   • MPV 06/11/2022 9.7  9.0 - 12.9 fL Final   • Neutrophils-Polys 06/11/2022 42.80 (A) 44.00 - 72.00 % Final   • Lymphocytes 06/11/2022 38.00  22.00 - 41.00 % Final   • Monocytes 06/11/2022 13.60 (A) 0.00 - 13.40 % Final   • Eosinophils 06/11/2022 4.10  0.00 - 6.90 % Final   • Basophils 06/11/2022 1.50  0.00 - 1.80 % Final   • Immature Granulocytes 06/11/2022 0.00  0.00 - 0.90 % Final   • Nucleated RBC 06/11/2022 0.00  /100 WBC Final   • Neutrophils (Absolute) 06/11/2022 1.76 (A) 2.00 - 7.15 K/uL Final    Includes immature neutrophils, if present.   • Lymphs (Absolute) 06/11/2022 1.56  1.00 - 4.80 K/uL Final   • Monos (Absolute) 06/11/2022 0.56  0.00 - 0.85 K/uL Final   • Eos (Absolute) 06/11/2022 0.17  0.00 - 0.51 K/uL Final   • Baso (Absolute) 06/11/2022 0.06  0.00 - 0.12 K/uL Final   • Immature Granulocytes (abs) 06/11/2022 0.00  0.00 - 0.11 K/uL Final   • NRBC (Absolute) 06/11/2022 0.00  K/uL Final   • Glycohemoglobin 06/11/2022 6.0 (A) 4.0 - 5.6 % Final    Comment: Increased risk for diabetes:  5.7 -6.4%  Diabetes:  >6.4%  Glycemic control for adults with diabetes:  <7.0%    The above interpretations are per ADA guidelines.  Diagnosis  of diabetes mellitus on the basis of elevated Hemoglobin A1c  should be confirmed by repeating the Hb A1c test.     • Est Avg Glucose 06/11/2022 126  mg/dL Final    Comment: The eAG calculation is based on the A1c-Derived Daily Glucose  (ADAG) study.  See the ADA's website for additional information.     • Sodium 06/11/2022 138  135 - 145 mmol/L Final   • Potassium 06/11/2022 4.0  3.6 - 5.5 mmol/L Final   • Chloride 06/11/2022 101  96 - 112 mmol/L Final   • Co2 06/11/2022 29  20 - 33 mmol/L Final   • Anion Gap 06/11/2022 8.0  7.0 - 16.0 Final   • Glucose 06/11/2022 116 (A) 65 - 99 mg/dL Final   • Bun 06/11/2022 12  8 - 22 mg/dL Final   •  Creatinine 06/11/2022 0.51  0.50 - 1.40 mg/dL Final   • Calcium 06/11/2022 9.4  8.5 - 10.5 mg/dL Final   • AST(SGOT) 06/11/2022 28  12 - 45 U/L Final   • ALT(SGPT) 06/11/2022 21  2 - 50 U/L Final   • Alkaline Phosphatase 06/11/2022 177 (A) 30 - 99 U/L Final   • Total Bilirubin 06/11/2022 0.5  0.1 - 1.5 mg/dL Final   • Albumin 06/11/2022 4.8  3.2 - 4.9 g/dL Final   • Total Protein 06/11/2022 8.6 (A) 6.0 - 8.2 g/dL Final   • Globulin 06/11/2022 3.8 (A) 1.9 - 3.5 g/dL Final   • A-G Ratio 06/11/2022 1.3  g/dL Final   • Cholesterol,Tot 06/11/2022 139  100 - 199 mg/dL Final   • Triglycerides 06/11/2022 105  0 - 149 mg/dL Final   • HDL 06/11/2022 44  >=40 mg/dL Final   • LDL 06/11/2022 74  <100 mg/dL Final   • Fasting Status 06/11/2022 Fasting   Final   • GFR (CKD-EPI) 06/11/2022 107  >60 mL/min/1.73 m 2 Final    Comment: Effective 3/15/2022, estimated Glomerular Filtration Rate  is calculated using race neutral CKD-EPI 2021 equation  per NKF-ASN recommendations.                       Assessment & Plan        1. Essential hypertension  Controlled on nifedipine.  Continue medication.  - Lipid Profile; Future  - Comp Metabolic Panel; Future  - HEMOGLOBIN A1C; Future  - NIFEdipine (ADALAT CC) 60 MG CR tablet; Take 1 Tablet by mouth every day.  Dispense: 90 Tablet; Refill: 3    2. Dyslipidemia  All at target on simvastatin.  Continue medication.  - Lipid Profile; Future  - Comp Metabolic Panel; Future  - HEMOGLOBIN A1C; Future  - simvastatin (ZOCOR) 20 MG Tab; Take 1 Tablet by mouth every evening.  Dispense: 90 Tablet; Refill: 3    3. Impaired fasting blood sugar  Fasting blood sugar and hemoglobin A1c consistent with prediabetes.  Continue avoidance of sweets and decreasing the carbs.  Recheck blood work in 6 months.  - Lipid Profile; Future  - Comp Metabolic Panel; Future  - HEMOGLOBIN A1C; Future    4. Castleman disease (HCC)  Continue follow-up with hematologist oncologist, Dr. Mullen.  She we will do surveillance PET scan  in January of next year.    I made her aware that I am leaving the practice because I am relocating to [place].  She will establish care with another PCP.      Please note that this dictation was created using voice recognition software. I have worked with consultants from the vendor as well as technical experts from [facility] to optimize the interface. I have made every reasonable attempt to correct obvious errors, but I expect that there are errors of grammar and possibly content I did not discover before finalizing the note.

## 2022-08-31 ENCOUNTER — OFFICE VISIT (OUTPATIENT)
Dept: MEDICAL GROUP | Facility: IMAGING CENTER | Age: 62
End: 2022-08-31
Payer: COMMERCIAL

## 2022-08-31 VITALS
HEIGHT: 61 IN | SYSTOLIC BLOOD PRESSURE: 138 MMHG | BODY MASS INDEX: 21.22 KG/M2 | RESPIRATION RATE: 16 BRPM | DIASTOLIC BLOOD PRESSURE: 70 MMHG | HEART RATE: 66 BPM | TEMPERATURE: 98.4 F | OXYGEN SATURATION: 96 % | WEIGHT: 112.4 LBS

## 2022-08-31 DIAGNOSIS — R73.01 IMPAIRED FASTING GLUCOSE: ICD-10-CM

## 2022-08-31 DIAGNOSIS — R22.1 NECK NODULE: ICD-10-CM

## 2022-08-31 DIAGNOSIS — I10 ESSENTIAL HYPERTENSION: ICD-10-CM

## 2022-08-31 DIAGNOSIS — D47.Z2 CASTLEMAN DISEASE (HCC): ICD-10-CM

## 2022-08-31 DIAGNOSIS — E78.5 DYSLIPIDEMIA: ICD-10-CM

## 2022-08-31 PROCEDURE — 99214 OFFICE O/P EST MOD 30 MIN: CPT | Performed by: FAMILY MEDICINE

## 2022-08-31 ASSESSMENT — PAIN SCALES - GENERAL: PAINLEVEL: NO PAIN

## 2022-08-31 ASSESSMENT — FIBROSIS 4 INDEX: FIB4 SCORE: 1.17

## 2022-08-31 NOTE — PROGRESS NOTES
Chief Complaint   Patient presents with    Establish Care       HPI:  62 y.o. female new patient here to review medical issues and establish care.   Previously with Dr. Carol Juarez MD who is moving out of the area.  Her sister is René.     Hypertension - BP at home- sometimes 120-140/70-80s.   She has been on nifedipine 60 mg daily CR tablet.  Moderate swelling.    Dyslipidemia-stable.  Recent lab work 6/11/2022 which she reviewed with her prior PCP.  Tolerates simvastatin 20 mg daily and is also on fish oils.    Impaired fasting glucose-stable labs.    Castleman disease-she is followed by heme/onc  She will need her FMLA paper renewed by January.  States she will have a repeat PET scan in the future.  She has otherwise been stable.    Incidental left superior anterior cervical region nodule on exam.    Lifestyle:  Diet: fruits/vegetables, chicken/beef  Exercise/Activities: walks some  Social Support: lives with  and grandson  Work: works with labs, [employer]  Last pap: 5/2021 negative, HPV negative  Immunizations:as below  Mammogram: has orders.   Colonoscopy: 2014, hemorrhoids noted.  Repeat in 10 years.  Dexa Scan: due in future    Health Maintenance Due   Topic Date Due    IMM HEP B VACCINE (1 of 3 - 3-dose series) 1960    IMM PNEUMOCOCCAL VACCINE: 0-64 Years (1 - PCV) Never done    COVID-19 Vaccine (4 - Booster for Pfizer series) 01/29/2022    MAMMOGRAM  08/24/2022    IMM INFLUENZA (1) 09/01/2022       Immunization History   Administered Date(s) Administered    Hepatitis B Vaccine Adolescent/Pediatric 07/28/1999, 08/28/1999, 01/28/2000    INFLUENZA TIV (IM) 04/22/2014    Influenza Seasonal Injectable - Historical Data 04/22/2014    Influenza Vaccine Quad Inj (Pf) 10/23/2014, 10/05/2015, 10/03/2016, 10/03/2017, 09/24/2018, 10/06/2019, 09/24/2020, 09/21/2021    Influenza Vaccine Quad Inj (Preserved) 10/05/2015, 10/03/2016    PFIZER PURPLE CAP SARS-COV-2 VACCINATION (12+)  02/06/2021, 02/27/2021, 09/29/2021    Tdap Vaccine 07/17/2014    Tuberculin Skin Test 04/22/2014, 04/30/2014    Zoster Vaccine Recombinant (RZV) (SHINGRIX) 05/25/2021, 12/06/2021         Review of Systems:  Constitutional: Negative for fever, chills and malaise/fatigue.   HENT: Negative for congestion, sore throat, or swallowing issues.   Eyes: Negative for pain or vision changes.   Respiratory: Negative for cough and shortness of breath.    Cardiovascular: Negative for leg swelling. No chest pain.   Gastrointestinal: Negative for nausea, vomiting, abdominal pain and diarrhea.   Genitourinary: Negative for dysuria and hematuria.   Skin: Negative for rash.   Neurological: Negative for dizziness, focal weakness and headaches.   Endo/Heme/Allergies: Does not bruise/bleed easily.   Psychiatric/Behavioral: Negative for depression.  The patient is not nervous/anxious.          Current Outpatient Medications:     simvastatin (ZOCOR) 20 MG Tab, Take 1 Tablet by mouth every evening., Disp: 90 Tablet, Rfl: 3    NIFEdipine (ADALAT CC) 60 MG CR tablet, Take 1 Tablet by mouth every day., Disp: 90 Tablet, Rfl: 3    Omega-3 Fatty Acids (FISH OIL) 1000 MG CAPS capsule, Take 1,000 mg by mouth 3 times a day, with meals., Disp: , Rfl:     vitamin D (CHOLECALCIFEROL) 1000 UNIT TABS, Take 2,000 Units by mouth every day., Disp: , Rfl:     No Known Allergies    Patient Active Problem List   Diagnosis    HTN (hypertension)    Dyslipidemia    Impaired fasting blood sugar    Dysarthria    Enlarged lymph nodes    Castleman disease (HCC)    Essential hypertension    Ataxia       Past Medical History:   Diagnosis Date    Ataxia     no neurologic etiology found    Dysarthria     no neurologic etiology found    Dyslipidemia     HTN (hypertension)     Hypertension        Past Surgical History:   Procedure Laterality Date    LYMPH NODE EXCISION  12/2/2014    Performed by Ирина Arcos M.D. at [facility]    COLONOSCOPY  9/14     "internal and external hemorrhoids    TUBAL COAGULATION LAPAROSCOPIC BILATERAL         Family History   Problem Relation Age of Onset    Hypertension Mother     Hyperlipidemia Mother     Hypertension Sister     Hyperlipidemia Sister     Hypertension Sister     Hyperlipidemia Sister        Social History     Tobacco Use    Smoking status: Never    Smokeless tobacco: Never   Vaping Use    Vaping Use: Never used   Substance Use Topics    Alcohol use: Yes     Alcohol/week: 0.0 oz     Comment: occasional    Drug use: No       PHYSICAL EXAM:  /70 (BP Location: Left arm, Patient Position: Sitting, BP Cuff Size: Adult)   Pulse 66   Temp 36.9 °C (98.4 °F) (Temporal)   Resp 16   Ht 1.549 m (5' 1\")   Wt 51 kg (112 lb 6.4 oz)   SpO2 96%   BMI 21.24 kg/m²   Constitutional: She appears well-developed and well-nourished. She appears not diaphoretic. No distress.   HENT: Right Ear: External ear normal. Left Ear: External ear normal. Tympanic membranes clear and intact.   Nose: Nose normal.   Mouth/Throat: Oropharynx is clear and moist. No oropharyngeal exudate.     Eyes: Conjunctivae and extraocular motions are normal. Pupils are equal, round, and reactive to light. No scleral icterus.   Neck: Normal range of motion.  Left superior anterior cervical region with ~1 cm nodule palpable, nontender to palpation.  No thyromegaly present.   Cardiovascular: Normal rate, regular rhythm, normal heart sounds and intact distal pulses.  Exam reveals no gallop and no friction rub.  No murmur heard. No carotid bruits.   Pulmonary/Chest: Effort normal and breath sounds normal. No respiratory distress. She has no wheezes. She has no rales.   Abdominal: Soft. Bowel sounds are normal. She exhibits no distension and no mass. No tenderness. She has no rebound and no guarding.   Lymphadenopathy:  She has no cervical adenopathy.   Neurological: She is alert. She has normal reflexes. No cranial nerve deficit. She exhibits normal muscle tone. "   Skin: Skin is warm and dry. No rash noted. She is not diaphoretic. No erythema.   Psychiatric: She has a normal mood and affect. Her behavior is normal.   Musculoskeletal: She exhibits no edema. Full strength throughout. 2+ DTR throughout.     Narrative & Impression     11/16/2021 8:20 AM     HISTORY/REASON FOR EXAM:  Castleman's disease presenting with right inguinal adenopathy diagnosed in 2014  Treatment: Radiation completed 3/24/2015     TECHNIQUE/EXAM DESCRIPTION AND NUMBER OF VIEWS:  PET body imaging.     Initially, 14.79 mCi F-18 FDG was administered intravenously under standardized conditions. Approximately 45 minutes after FDG administration, the patient was placed in the supine position on the PET CT table. Blood glucose level was 119 mg/dL.     Low dose spiral CT imaging was performed from the skull base to the mid thighs.     PET imaging was then performed from the skull base to the mid thighs. CT images, PET images, and PET/CT fused images were reviewed on a PACS 3D workstation.     The limited non-contrast CT data are used primarily for attenuation correction and anatomic correlation.  Evaluation of solid organs and bowel are especially limited utilizing this technique.        COMPARISON: 3/16/2021     FINDINGS:  Head and neck: A right level 2 cervical lymph node measures approximately 0.9 cm short axis, unchanged. Maximum SUV is 4.1, previously 2.4. A left level 2 lymph node measures approximately 0.8 cm short axis, previously 0.6 cm. Maximum SUV is 3.1.. A left   level 3 lymph node measures approximately 0.9 cm, unchanged. Maximum SUV is 3.9, previously 2. A left level 4 lymph node measures approximately 1.4 cm short axis, previously 1.3 cm. Maximum SUV is 3.7, previously 2.3. There is milder activity in   multiple white cervical lymph nodes.     Symmetric activity in the palatine tonsils may be inflammatory. Focal activity in the posterior right maxilla has a maximum SUV of 3.9, possibly  inflammatory.     Chest: No abnormal focal FDG activity.     Abdomen and pelvis: Focal activity in the right colon does not correlate with a cervical mass on CT. Maximum SUV is 6.3.     A right external iliac lymph node measures approximately 1.1 cm short axis, previously 1.2 cm. Maximum SUV is approximately 2.4, previously 1.8. A right inguinal lymph node measures approximately 0.8 cm short axis, unchanged. Maximum SUV is only 1.4,   unchanged.     Musculoskeletal: No abnormal focal FDG activity.     Incidental findings on CT: Scattered arterial calcifications. A cyst is on the upper pole of the left kidney     IMPRESSION:     1.  Interval progression of disease with increased metabolic activity in the cervical and right pelvic/inguinal lymph nodes. Alanis size is similar to the prior exam.     2.  Focal activity in the right colon is likely physiologic/inflammatory. No correlating lesion is appreciated on CT           Exam Ended: 11/16/21 10:00 AM Last Resulted: 11/16/21  1:04 PM           ASSESSMENT/PLAN:    This is a 62 y.o. female new patient.     1. Essential hypertension-stable.  Continue current medication.  Recommend heart healthy diet and routine exercise.  Monitor in future.       2. Dyslipidemia-stable.  Recommend low-cholesterol, high-fiber diet and routine exercise.  Continue current medication.  Patient has lab orders.       3. Impaired fasting blood sugar -stable.  Recommend low sugar/low processed food diet and routine exercise.  Monitor labs in future.  Patient has lab orders.       4. Castleman disease (HCC) -stable.  Patient will follow up with hematology oncology.  She will plan for future PET scan.  Patient will need [facility] paperwork in the future.       5. Neck nodule -incidental left superior anterior cervical neck nodule noted.  Review of prior PET scan shows lymph nodes of area.  Patient will plan to check with hematology/oncology.  Otherwise advised we may consider further evaluation with  ultrasound.       Patient will plan to complete lab work prior to follow-up.  Return in about 4 months (around 12/31/2022).          This medical record contains text that has been entered with the assistance of computer voice recognition and dictation software.  Therefore, it may contain unintended errors in text, spelling, punctuation, or grammar.

## 2022-09-07 ENCOUNTER — HOSPITAL ENCOUNTER (OUTPATIENT)
Dept: RADIOLOGY | Facility: MEDICAL CENTER | Age: 62
End: 2022-09-07
Attending: FAMILY MEDICINE
Payer: COMMERCIAL

## 2022-09-07 DIAGNOSIS — Z12.31 VISIT FOR SCREENING MAMMOGRAM: ICD-10-CM

## 2022-09-07 PROCEDURE — 77063 BREAST TOMOSYNTHESIS BI: CPT

## 2022-09-13 ENCOUNTER — EH NON-PROVIDER (OUTPATIENT)
Dept: OCCUPATIONAL MEDICINE | Facility: CLINIC | Age: 62
End: 2022-09-13

## 2022-09-13 ENCOUNTER — HOSPITAL ENCOUNTER (OUTPATIENT)
Facility: MEDICAL CENTER | Age: 62
End: 2022-09-13
Attending: NURSE PRACTITIONER
Payer: COMMERCIAL

## 2022-09-13 PROCEDURE — 86480 TB TEST CELL IMMUN MEASURE: CPT | Performed by: PREVENTIVE MEDICINE

## 2022-09-15 LAB
GAMMA INTERFERON BACKGROUND BLD IA-ACNC: 0.05 IU/ML
M TB IFN-G BLD-IMP: NEGATIVE
M TB IFN-G CD4+ BCKGRND COR BLD-ACNC: 0.01 IU/ML
MITOGEN IGNF BCKGRD COR BLD-ACNC: >10 IU/ML
QFT TB2 - NIL TBQ2: 0 IU/ML

## 2022-09-27 ENCOUNTER — IMMUNIZATION (OUTPATIENT)
Dept: OCCUPATIONAL MEDICINE | Facility: CLINIC | Age: 62
End: 2022-09-27

## 2022-09-27 DIAGNOSIS — Z23 NEED FOR VACCINATION: Primary | ICD-10-CM

## 2022-09-27 PROCEDURE — 90686 IIV4 VACC NO PRSV 0.5 ML IM: CPT | Performed by: PREVENTIVE MEDICINE

## 2022-12-17 ENCOUNTER — HOSPITAL ENCOUNTER (OUTPATIENT)
Dept: LAB | Facility: MEDICAL CENTER | Age: 62
End: 2022-12-17
Attending: FAMILY MEDICINE
Payer: COMMERCIAL

## 2022-12-17 DIAGNOSIS — E78.5 DYSLIPIDEMIA: ICD-10-CM

## 2022-12-17 DIAGNOSIS — R73.01 IMPAIRED FASTING GLUCOSE: ICD-10-CM

## 2022-12-17 DIAGNOSIS — I10 ESSENTIAL HYPERTENSION: ICD-10-CM

## 2022-12-17 LAB
ALBUMIN SERPL BCP-MCNC: 5 G/DL (ref 3.2–4.9)
ALBUMIN/GLOB SERPL: 1.2 G/DL
ALP SERPL-CCNC: 176 U/L (ref 30–99)
ALT SERPL-CCNC: 19 U/L (ref 2–50)
ANION GAP SERPL CALC-SCNC: 12 MMOL/L (ref 7–16)
AST SERPL-CCNC: 40 U/L (ref 12–45)
BILIRUB SERPL-MCNC: 0.6 MG/DL (ref 0.1–1.5)
BUN SERPL-MCNC: 14 MG/DL (ref 8–22)
CALCIUM ALBUM COR SERPL-MCNC: 9.4 MG/DL (ref 8.5–10.5)
CALCIUM SERPL-MCNC: 10.2 MG/DL (ref 8.5–10.5)
CHLORIDE SERPL-SCNC: 106 MMOL/L (ref 96–112)
CHOLEST SERPL-MCNC: 161 MG/DL (ref 100–199)
CO2 SERPL-SCNC: 28 MMOL/L (ref 20–33)
CREAT SERPL-MCNC: 0.49 MG/DL (ref 0.5–1.4)
EST. AVERAGE GLUCOSE BLD GHB EST-MCNC: 123 MG/DL
FASTING STATUS PATIENT QL REPORTED: NORMAL
GFR SERPLBLD CREATININE-BSD FMLA CKD-EPI: 107 ML/MIN/1.73 M 2
GLOBULIN SER CALC-MCNC: 4.1 G/DL (ref 1.9–3.5)
GLUCOSE SERPL-MCNC: 121 MG/DL (ref 65–99)
HBA1C MFR BLD: 5.9 % (ref 4–5.6)
HDLC SERPL-MCNC: 50 MG/DL
LDLC SERPL CALC-MCNC: 88 MG/DL
POTASSIUM SERPL-SCNC: 4.8 MMOL/L (ref 3.6–5.5)
PROT SERPL-MCNC: 9.1 G/DL (ref 6–8.2)
SODIUM SERPL-SCNC: 146 MMOL/L (ref 135–145)
TRIGL SERPL-MCNC: 115 MG/DL (ref 0–149)

## 2022-12-17 PROCEDURE — 80053 COMPREHEN METABOLIC PANEL: CPT

## 2022-12-17 PROCEDURE — 80061 LIPID PANEL: CPT

## 2022-12-17 PROCEDURE — 36415 COLL VENOUS BLD VENIPUNCTURE: CPT

## 2022-12-17 PROCEDURE — 83036 HEMOGLOBIN GLYCOSYLATED A1C: CPT

## 2023-01-10 ENCOUNTER — HOSPITAL ENCOUNTER (OUTPATIENT)
Dept: RADIOLOGY | Facility: MEDICAL CENTER | Age: 63
End: 2023-01-10
Attending: INTERNAL MEDICINE
Payer: COMMERCIAL

## 2023-01-11 ENCOUNTER — APPOINTMENT (OUTPATIENT)
Dept: MEDICAL GROUP | Facility: IMAGING CENTER | Age: 63
End: 2023-01-11
Payer: COMMERCIAL

## 2023-01-12 ENCOUNTER — HOSPITAL ENCOUNTER (OUTPATIENT)
Dept: RADIOLOGY | Facility: MEDICAL CENTER | Age: 63
End: 2023-01-12
Attending: INTERNAL MEDICINE
Payer: COMMERCIAL

## 2023-01-12 DIAGNOSIS — D47.Z2 ANGIOLYMPHOID HYPERPLASIA (HCC): ICD-10-CM

## 2023-01-12 PROCEDURE — A9552 F18 FDG: HCPCS

## 2023-02-03 ENCOUNTER — OFFICE VISIT (OUTPATIENT)
Dept: MEDICAL GROUP | Facility: IMAGING CENTER | Age: 63
End: 2023-02-03
Payer: COMMERCIAL

## 2023-02-03 VITALS
DIASTOLIC BLOOD PRESSURE: 64 MMHG | HEART RATE: 75 BPM | HEIGHT: 61 IN | SYSTOLIC BLOOD PRESSURE: 116 MMHG | OXYGEN SATURATION: 97 % | RESPIRATION RATE: 17 BRPM | TEMPERATURE: 97.2 F | BODY MASS INDEX: 20.09 KG/M2 | WEIGHT: 106.4 LBS

## 2023-02-03 DIAGNOSIS — E78.5 DYSLIPIDEMIA: ICD-10-CM

## 2023-02-03 DIAGNOSIS — R74.8 ALKALINE PHOSPHATASE ELEVATION: ICD-10-CM

## 2023-02-03 DIAGNOSIS — I10 ESSENTIAL HYPERTENSION: ICD-10-CM

## 2023-02-03 DIAGNOSIS — R77.1 HIGH GLOBULIN LEVEL: ICD-10-CM

## 2023-02-03 DIAGNOSIS — R22.1 NECK NODULE: ICD-10-CM

## 2023-02-03 DIAGNOSIS — D47.Z2 CASTLEMAN DISEASE (HCC): ICD-10-CM

## 2023-02-03 DIAGNOSIS — R73.03 PREDIABETES: ICD-10-CM

## 2023-02-03 PROCEDURE — 99214 OFFICE O/P EST MOD 30 MIN: CPT | Performed by: FAMILY MEDICINE

## 2023-02-03 ASSESSMENT — PAIN SCALES - GENERAL: PAINLEVEL: NO PAIN

## 2023-02-03 ASSESSMENT — FIBROSIS 4 INDEX: FIB4 SCORE: 1.78

## 2023-02-03 ASSESSMENT — PATIENT HEALTH QUESTIONNAIRE - PHQ9: CLINICAL INTERPRETATION OF PHQ2 SCORE: 0

## 2023-02-03 NOTE — PROGRESS NOTES
SUBJECTIVE:    Chief Complaint   Patient presents with    Results     CT Results        HPI:     Martha Oliver is a 63 y.o. female with Castleman's disease here for lab results.   Had PET scan done. Notes left neck node not felt per Dr. Dallas.   Castleman's disease-diagnosed  2015. Lymph nodes followed by Dr. Dallas.   Elevated protein, elevated globulin- noted from 2021.   Elevated alkaline phosphatase  Slightly elevated sodium.   Glucose 121, a1c 5.9%.   Stable on simvastatin 20 mg and nifedipine 60 mg daily.   BP stable.       ROS:  No recent fevers or chills. No nausea or vomiting. No diarrhea. No abdominal pain. No chest pains or shortness of breath. No lower extremity edema.    Current Outpatient Medications on File Prior to Visit   Medication Sig Dispense Refill    Ascorbic Acid (VITAMIN C PO) Take  by mouth.      simvastatin (ZOCOR) 20 MG Tab Take 1 Tablet by mouth every evening. 90 Tablet 3    NIFEdipine (ADALAT CC) 60 MG CR tablet Take 1 Tablet by mouth every day. 90 Tablet 3    Omega-3 Fatty Acids (FISH OIL) 1000 MG CAPS capsule Take 1,000 mg by mouth 3 times a day, with meals.      vitamin D (CHOLECALCIFEROL) 1000 UNIT TABS Take 2,000 Units by mouth every day.       No current facility-administered medications on file prior to visit.       No Known Allergies    Patient Active Problem List    Diagnosis Date Noted    Essential hypertension 10/21/2015    Ataxia 10/21/2015    Castleman disease (HCC) 02/12/2015    Enlarged lymph nodes 12/02/2014    Dysarthria     Impaired fasting blood sugar 07/17/2014    HTN (hypertension)     Dyslipidemia        Past Medical History:   Diagnosis Date    Ataxia     no neurologic etiology found    Dysarthria     no neurologic etiology found    Dyslipidemia     HTN (hypertension)     Hypertension          OBJECTIVE:   /64 (BP Location: Left arm, Patient Position: Sitting, BP Cuff Size: Adult)   Pulse 75   Temp 36.2 °C (97.2 °F) (Temporal)   Resp 17   Ht 1.549 m (5'  "1\")   Wt 48.3 kg (106 lb 6.4 oz)   LMP 08/21/2014   SpO2 97%   BMI 20.10 kg/m²   General: Well-developed well-nourished female, no acute distress  Neck: supple, left anterior superior cervical lymph node appears slightly prominent, nontender, otherwise no supraclavicular DWAIN, no thyromegaly  Cardiovascular: regular rate and rhythm, no murmurs, gallops, rubs  Lungs: clear to auscultation bilaterally, no wheezes, crackles, or rhonchi  Abdomen: +bowel sounds, soft, nontender, nondistended, no rebound, no guarding, no hepatosplenomegaly  Extremities: no cyanosis, clubbing, edema  Skin: Warm and dry  Psych: appropriate mood and affect     Latest Reference Range & Units 12/17/22 09:27   Sodium 135 - 145 mmol/L 146 (H)   Potassium 3.6 - 5.5 mmol/L 4.8   Chloride 96 - 112 mmol/L 106   Co2 20 - 33 mmol/L 28   Anion Gap 7.0 - 16.0  12.0   Glucose 65 - 99 mg/dL 121 (H)   Bun 8 - 22 mg/dL 14   Creatinine 0.50 - 1.40 mg/dL 0.49 (L)   GFR (CKD-EPI) >60 mL/min/1.73 m 2 107   Calcium 8.5 - 10.5 mg/dL 10.2   Correct Calcium 8.5 - 10.5 mg/dL 9.4   AST(SGOT) 12 - 45 U/L 40   ALT(SGPT) 2 - 50 U/L 19   Alkaline Phosphatase 30 - 99 U/L 176 (H)   Total Bilirubin 0.1 - 1.5 mg/dL 0.6   Albumin 3.2 - 4.9 g/dL 5.0 (H)   Total Protein 6.0 - 8.2 g/dL 9.1 (H)   Globulin 1.9 - 3.5 g/dL 4.1 (H)   A-G Ratio g/dL 1.2   Glycohemoglobin 4.0 - 5.6 % 5.9 (H)   Estim. Avg Glu mg/dL 123   Fasting Status  Fasting   Cholesterol,Tot 100 - 199 mg/dL 161   Triglycerides 0 - 149 mg/dL 115   HDL >=40 mg/dL 50   LDL <100 mg/dL 88   (H): Data is abnormally high  (L): Data is abnormally low      PET scan per heme/onc.   IMPRESSION:     1.  There is stable disease with no significant change in the size or SUV values of bilateral cervical nodes or right hemipelvic nodes.           Exam Ended: 01/12/23  3:27 PM Last Resulted: 01/12/23  5:23 PM               ASSESSMENT/PLAN:    63 y.o.female with Castleman's disease.     1. Castleman disease (HCC) -stable, followed " by heme/onc. Stable. Continue routine follow up with specialist.        2. Neck nodule - recently completed PET scan. Stable. Monitor.           3. High globulin level - appears slight increase, slight elevation since 2021. Notes Castleman's diagnosed 2015.   Will evaluate further with lab work. Monitor.  SPEP W/REFLEX TO EDUIN, A, G, M    URINALYSIS,CULTURE IF INDICATED    Comp Metabolic Panel      4. Alkaline phosphatase elevation - assess with further lab work. Monitor.  ALKALINE PHOSPHATASE ISOENZYMES    Comp Metabolic Panel      5. Essential hypertension - stable, continue current medication. Heart healthy diet and routine exercise. Monitor.        6. Dyslipidemia - stable, continue current medication. Recommend low cholesterol/ high fiber diet and routine exercise. Monitor in future in 4-6 months.         7. Prediabetes   Recommend low sugar/low processed food diet and routine exercise. Monitor in future. Plan to repeat in labs in 4-6 months, will need future lab orders placed.          Plan to complete labs in one week.   Return in about 6 weeks (around 3/17/2023).    This medical record contains text that has been entered with the assistance of computer voice recognition and dictation software.  Therefore, it may contain unintended errors in text, spelling, punctuation, or grammar.

## 2023-02-07 PROBLEM — R74.8 ALKALINE PHOSPHATASE ELEVATION: Status: ACTIVE | Noted: 2023-02-07

## 2023-02-07 PROBLEM — R22.1 NECK NODULE: Status: ACTIVE | Noted: 2023-02-07

## 2023-02-07 PROBLEM — R73.03 PREDIABETES: Status: ACTIVE | Noted: 2023-02-07

## 2023-02-07 PROBLEM — R77.1 HIGH GLOBULIN LEVEL: Status: ACTIVE | Noted: 2023-02-07

## 2023-02-23 DIAGNOSIS — I10 ESSENTIAL HYPERTENSION: ICD-10-CM

## 2023-02-23 DIAGNOSIS — E78.5 DYSLIPIDEMIA: ICD-10-CM

## 2023-02-23 RX ORDER — SIMVASTATIN 20 MG
20 TABLET ORAL EVERY EVENING
Qty: 90 TABLET | Refills: 3 | Status: SHIPPED | OUTPATIENT
Start: 2023-02-23 | End: 2023-11-22 | Stop reason: SDUPTHER

## 2023-02-23 NOTE — TELEPHONE ENCOUNTER
Received request via: Patient    Was the patient seen in the last year in this department? Yes    Does the patient have an active prescription (recently filled or refills available) for medication(s) requested? No    Does the patient have [facility] and need 100 day supply (blood pressure, diabetes and cholesterol meds only)? Patient does not have SCP    Pt stated you talked about taking over prescription from previous dr in last visit

## 2023-02-25 ENCOUNTER — HOSPITAL ENCOUNTER (OUTPATIENT)
Dept: LAB | Facility: MEDICAL CENTER | Age: 63
End: 2023-02-25
Attending: FAMILY MEDICINE
Payer: COMMERCIAL

## 2023-02-25 DIAGNOSIS — R74.8 ALKALINE PHOSPHATASE ELEVATION: ICD-10-CM

## 2023-02-25 DIAGNOSIS — R77.1 HIGH GLOBULIN LEVEL: ICD-10-CM

## 2023-02-25 LAB
ALBUMIN SERPL BCP-MCNC: 5.1 G/DL (ref 3.2–4.9)
ALBUMIN/GLOB SERPL: 1.2 G/DL
ALP SERPL-CCNC: 193 U/L (ref 30–99)
ALT SERPL-CCNC: 26 U/L (ref 2–50)
ANION GAP SERPL CALC-SCNC: 10 MMOL/L (ref 7–16)
APPEARANCE UR: CLEAR
AST SERPL-CCNC: 33 U/L (ref 12–45)
BACTERIA #/AREA URNS HPF: ABNORMAL /HPF
BILIRUB SERPL-MCNC: 0.5 MG/DL (ref 0.1–1.5)
BILIRUB UR QL STRIP.AUTO: NEGATIVE
BUN SERPL-MCNC: 16 MG/DL (ref 8–22)
CALCIUM ALBUM COR SERPL-MCNC: 9.2 MG/DL (ref 8.5–10.5)
CALCIUM SERPL-MCNC: 10.1 MG/DL (ref 8.5–10.5)
CHLORIDE SERPL-SCNC: 99 MMOL/L (ref 96–112)
CO2 SERPL-SCNC: 29 MMOL/L (ref 20–33)
COLOR UR: YELLOW
CREAT SERPL-MCNC: 0.48 MG/DL (ref 0.5–1.4)
EPI CELLS #/AREA URNS HPF: ABNORMAL /HPF
GFR SERPLBLD CREATININE-BSD FMLA CKD-EPI: 108 ML/MIN/1.73 M 2
GLOBULIN SER CALC-MCNC: 4.2 G/DL (ref 1.9–3.5)
GLUCOSE SERPL-MCNC: 122 MG/DL (ref 65–99)
GLUCOSE UR STRIP.AUTO-MCNC: NEGATIVE MG/DL
HYALINE CASTS #/AREA URNS LPF: ABNORMAL /LPF
KETONES UR STRIP.AUTO-MCNC: NEGATIVE MG/DL
LEUKOCYTE ESTERASE UR QL STRIP.AUTO: ABNORMAL
MICRO URNS: ABNORMAL
NITRITE UR QL STRIP.AUTO: NEGATIVE
PH UR STRIP.AUTO: 7.5 [PH] (ref 5–8)
POTASSIUM SERPL-SCNC: 3.8 MMOL/L (ref 3.6–5.5)
PROT SERPL-MCNC: 9.3 G/DL (ref 6–8.2)
PROT UR QL STRIP: NEGATIVE MG/DL
RBC # URNS HPF: ABNORMAL /HPF
RBC UR QL AUTO: NEGATIVE
SODIUM SERPL-SCNC: 138 MMOL/L (ref 135–145)
SP GR UR STRIP.AUTO: 1.01
UROBILINOGEN UR STRIP.AUTO-MCNC: 0.2 MG/DL
WBC #/AREA URNS HPF: ABNORMAL /HPF

## 2023-02-25 PROCEDURE — 81001 URINALYSIS AUTO W/SCOPE: CPT

## 2023-02-25 PROCEDURE — 36415 COLL VENOUS BLD VENIPUNCTURE: CPT

## 2023-02-25 PROCEDURE — 87086 URINE CULTURE/COLONY COUNT: CPT

## 2023-02-25 PROCEDURE — 84080 ASSAY ALKALINE PHOSPHATASES: CPT

## 2023-02-25 PROCEDURE — 84155 ASSAY OF PROTEIN SERUM: CPT

## 2023-02-25 PROCEDURE — 84165 PROTEIN E-PHORESIS SERUM: CPT

## 2023-02-25 PROCEDURE — 80053 COMPREHEN METABOLIC PANEL: CPT

## 2023-02-25 PROCEDURE — 84075 ASSAY ALKALINE PHOSPHATASE: CPT

## 2023-02-27 LAB
BACTERIA UR CULT: NORMAL
SIGNIFICANT IND 70042: NORMAL
SITE SITE: NORMAL
SOURCE SOURCE: NORMAL

## 2023-02-28 LAB
ALBUMIN SERPL ELPH-MCNC: 4.79 G/DL (ref 3.75–5.01)
ALPHA1 GLOB SERPL ELPH-MCNC: 0.3 G/DL (ref 0.19–0.46)
ALPHA2 GLOB SERPL ELPH-MCNC: 0.81 G/DL (ref 0.48–1.05)
B-GLOBULIN SERPL ELPH-MCNC: 0.92 G/DL (ref 0.48–1.1)
GAMMA GLOB SERPL ELPH-MCNC: 2.19 G/DL (ref 0.62–1.51)
INTERPRETATION SERPL IFE-IMP: ABNORMAL
MONOCLON BAND OBS SERPL: ABNORMAL
MONOCLONAL PROTEIN NL11656: ABNORMAL G/DL
PATHOLOGY STUDY: ABNORMAL
PROT SERPL-MCNC: 9 G/DL (ref 6.3–8.2)

## 2023-03-01 LAB
ALP BONE SERPL-CCNC: 103 U/L (ref 0–55)
ALP ISOS SERPL HS-CCNC: 0 U/L
ALP LIVER SERPL-CCNC: 99 U/L (ref 0–94)
ALP SERPL-CCNC: 202 U/L (ref 40–120)

## 2023-03-15 ENCOUNTER — OFFICE VISIT (OUTPATIENT)
Dept: MEDICAL GROUP | Facility: IMAGING CENTER | Age: 63
End: 2023-03-15
Payer: COMMERCIAL

## 2023-03-15 VITALS
SYSTOLIC BLOOD PRESSURE: 124 MMHG | HEART RATE: 97 BPM | WEIGHT: 106.6 LBS | OXYGEN SATURATION: 98 % | RESPIRATION RATE: 16 BRPM | BODY MASS INDEX: 20.12 KG/M2 | DIASTOLIC BLOOD PRESSURE: 62 MMHG | TEMPERATURE: 97.8 F | HEIGHT: 61 IN

## 2023-03-15 DIAGNOSIS — R77.9 ELEVATED BLOOD PROTEIN: ICD-10-CM

## 2023-03-15 DIAGNOSIS — R74.8 ALKALINE PHOSPHATASE ELEVATION: ICD-10-CM

## 2023-03-15 DIAGNOSIS — R77.1 HIGH GLOBULIN LEVEL: ICD-10-CM

## 2023-03-15 DIAGNOSIS — D47.Z2 CASTLEMAN DISEASE (HCC): ICD-10-CM

## 2023-03-15 DIAGNOSIS — I10 PRIMARY HYPERTENSION: ICD-10-CM

## 2023-03-15 PROCEDURE — 99213 OFFICE O/P EST LOW 20 MIN: CPT | Performed by: FAMILY MEDICINE

## 2023-03-15 ASSESSMENT — PAIN SCALES - GENERAL: PAINLEVEL: NO PAIN

## 2023-03-15 ASSESSMENT — FIBROSIS 4 INDEX: FIB4 SCORE: 1.26

## 2023-03-15 NOTE — PROGRESS NOTES
SUBJECTIVE:    Chief Complaint   Patient presents with    Lab Results     Dyslipidemia        HPI:     Martha Oliver is a 63 y.o. female  with Castleman's disease here for reivew of labs.   She does have a follow-up with Dr. Dallas in July.   Alkaline phosphatase levels elevated.  Bone and liver fractions noted.  Denies any new symptoms.  No GI symptoms.  States she has changed her diet some.  Has not noticed any lymphadenopathy.  No unusual weight loss.  Hypertension-stable on nifedipine 60 mg daily.    ROS:  No recent fevers or chills. No nausea or vomiting. No diarrhea. No chest pains or shortness of breath. No lower extremity edema.    Current Outpatient Medications on File Prior to Visit   Medication Sig Dispense Refill    simvastatin (ZOCOR) 20 MG Tab Take 1 Tablet by mouth every evening. 90 Tablet 3    NIFEdipine (ADALAT CC) 60 MG CR tablet Take 1 Tablet by mouth every day. 90 Tablet 3    Ascorbic Acid (VITAMIN C PO) Take  by mouth.      Omega-3 Fatty Acids (FISH OIL) 1000 MG CAPS capsule Take 1,000 mg by mouth 3 times a day, with meals.      vitamin D (CHOLECALCIFEROL) 1000 UNIT TABS Take 2,000 Units by mouth every day.       No current facility-administered medications on file prior to visit.       No Known Allergies    Patient Active Problem List    Diagnosis Date Noted    Prediabetes 02/07/2023    Alkaline phosphatase elevation 02/07/2023    High globulin level 02/07/2023    Neck nodule 02/07/2023    Essential hypertension 10/21/2015    Ataxia 10/21/2015    Castleman disease (HCC) 02/12/2015    Enlarged lymph nodes 12/02/2014    Dysarthria     Impaired fasting blood sugar 07/17/2014    HTN (hypertension)     Dyslipidemia        Past Medical History:   Diagnosis Date    Ataxia     no neurologic etiology found    Dysarthria     no neurologic etiology found    Dyslipidemia     HTN (hypertension)     Hypertension          OBJECTIVE:   /62 (BP Location: Left arm, Patient Position: Sitting, BP Cuff  "Size: Adult)   Pulse 97   Temp 36.6 °C (97.8 °F) (Temporal)   Resp 16   Ht 1.549 m (5' 1\")   Wt 48.4 kg (106 lb 9.6 oz)   LMP 08/21/2014   SpO2 98%   BMI 20.14 kg/m²   General: Well-developed well-nourished female, no acute distress  Neck: supple, no lymphadenopathy- cervical or supraclavicular, no thyromegaly  Cardiovascular: regular rate and rhythm, no murmurs, gallops, rubs  Lungs: clear to auscultation bilaterally, no wheezes, crackles, or rhonchi  Abdomen: +bowel sounds, soft, nontender, nondistended, no rebound, no guarding, no hepatosplenomegaly  Extremities: no cyanosis, clubbing, edema  Skin: Warm and dry  Psych: appropriate mood and affect       Latest Reference Range & Units 02/25/23 09:18   Sodium 135 - 145 mmol/L 138   Potassium 3.6 - 5.5 mmol/L 3.8   Chloride 96 - 112 mmol/L 99   Co2 20 - 33 mmol/L 29   Anion Gap 7.0 - 16.0  10.0   Glucose 65 - 99 mg/dL 122 (H)   Bun 8 - 22 mg/dL 16   Creatinine 0.50 - 1.40 mg/dL 0.48 (L)   GFR (CKD-EPI) >60 mL/min/1.73 m 2 108   Calcium 8.5 - 10.5 mg/dL 10.1   Correct Calcium 8.5 - 10.5 mg/dL 9.2   AST(SGOT) 12 - 45 U/L 33   ALT(SGPT) 2 - 50 U/L 26   Alkaline Phosphatase 30 - 99 U/L 193 (H)   Total Bilirubin 0.1 - 1.5 mg/dL 0.5   Albumin 3.2 - 4.9 g/dL 5.1 (H)   Total Protein 6.0 - 8.2 g/dL 9.3 (H)   Globulin 1.9 - 3.5 g/dL 4.2 (H)   A-G Ratio g/dL 1.2   Alkaline Phosphatase 40 - 120 U/L 202 (H)   Bone Fractions 0 - 55 U/L 103 (H)   Liver Fractions 0 - 94 U/L 99 (H)   Alk Phos Other Calc U/L 0   Urobilinogen, Urine Negative  0.2   Color  Yellow   Character  Clear   Specific Gravity <1.035  1.006   Ph 5.0 - 8.0  7.5   Glucose Negative mg/dL Negative   Ketones Negative mg/dL Negative   Bilirubin Negative  Negative   Occult Blood Negative  Negative   Protein Negative mg/dL Negative   Nitrite Negative  Negative   Leukocyte Esterase Negative  Moderate !   Micro Urine Req  Microscopic   WBC /hpf 10-20 !   RBC /hpf 0-2   Epithelial Cells /hpf Few   Bacteria None /hpf " Few !   Hyaline Cast /lpf 0-2   EDUIN Reflex  Not Done   Interpretation  See Note   Albumin 3.75 - 5.01 g/dL 4.79   Gamma Globulin 0.62 - 1.51 g/dL 2.19 (H)   Alpha-1 Globulin 0.19 - 0.46 g/dL 0.30   Alpha-2 Globulin 0.48 - 1.05 g/dL 0.81   Beta Globulin 0.48 - 1.10 g/dL 0.92   EER Serum Prot. Electro. Reflex  See Note   Monoclonal Protein g/dL Not Applicable   Significant Indicator  NEG   Site  -   Source  UR   Total Protein, Serum 6.3 - 8.2 g/dL 9.0 (H)   (H): Data is abnormally high  (L): Data is abnormally low  !: Data is abnormal        ASSESSMENT/PLAN:    63 y.o.female with Castleman's disease.     1. Alkaline phosphatase elevation-bone and liver fractions noted.  Has completed prior PET scan 1/12/2023.  Will assess ultrasound of liver. US-RUQ      2. High globulin level -gammaglobulin levels appear similar to prior labs in past.  Monitor. Follow up with heme/onc.        3. Elevated blood protein -slightly higher compared to prior.  Monitor.       4. Castleman disease (HCC)-she did have prior PET scan completed.  Follow-up with Dr. Dallas routinely.       5. Primary hypertension -controlled.  Continue current medication.       Discussed possible sooner follow-up with Dr. Dallas as needed after imaging is complete.    Return in about 2 months (around 5/15/2023).  Follow up sooner as needed.     This medical record contains text that has been entered with the assistance of computer voice recognition and dictation software.  Therefore, it may contain unintended errors in text, spelling, punctuation, or grammar.

## 2023-04-05 ENCOUNTER — HOSPITAL ENCOUNTER (OUTPATIENT)
Dept: RADIOLOGY | Facility: MEDICAL CENTER | Age: 63
End: 2023-04-05
Attending: FAMILY MEDICINE
Payer: COMMERCIAL

## 2023-04-05 DIAGNOSIS — R74.8 ALKALINE PHOSPHATASE ELEVATION: ICD-10-CM

## 2023-04-05 PROCEDURE — 76705 ECHO EXAM OF ABDOMEN: CPT

## 2023-05-17 ENCOUNTER — OFFICE VISIT (OUTPATIENT)
Dept: MEDICAL GROUP | Facility: IMAGING CENTER | Age: 63
End: 2023-05-17
Payer: COMMERCIAL

## 2023-05-17 VITALS
RESPIRATION RATE: 17 BRPM | DIASTOLIC BLOOD PRESSURE: 60 MMHG | BODY MASS INDEX: 20.24 KG/M2 | SYSTOLIC BLOOD PRESSURE: 114 MMHG | TEMPERATURE: 97.3 F | OXYGEN SATURATION: 97 % | WEIGHT: 107.2 LBS | HEIGHT: 61 IN | HEART RATE: 77 BPM

## 2023-05-17 DIAGNOSIS — R74.8 ALKALINE PHOSPHATASE ELEVATION: ICD-10-CM

## 2023-05-17 DIAGNOSIS — R77.1 HIGH GLOBULIN LEVEL: ICD-10-CM

## 2023-05-17 DIAGNOSIS — R77.9 ELEVATED BLOOD PROTEIN: ICD-10-CM

## 2023-05-17 DIAGNOSIS — I10 ESSENTIAL HYPERTENSION: ICD-10-CM

## 2023-05-17 DIAGNOSIS — D47.Z2 CASTLEMAN DISEASE (HCC): ICD-10-CM

## 2023-05-17 DIAGNOSIS — E78.5 DYSLIPIDEMIA: ICD-10-CM

## 2023-05-17 DIAGNOSIS — R73.03 PREDIABETES: ICD-10-CM

## 2023-05-17 PROCEDURE — 3074F SYST BP LT 130 MM HG: CPT | Performed by: FAMILY MEDICINE

## 2023-05-17 PROCEDURE — 3078F DIAST BP <80 MM HG: CPT | Performed by: FAMILY MEDICINE

## 2023-05-17 PROCEDURE — 99214 OFFICE O/P EST MOD 30 MIN: CPT | Performed by: FAMILY MEDICINE

## 2023-05-17 ASSESSMENT — FIBROSIS 4 INDEX: FIB4 SCORE: 1.26

## 2023-05-17 ASSESSMENT — PAIN SCALES - GENERAL: PAINLEVEL: NO PAIN

## 2023-05-17 NOTE — PATIENT INSTRUCTIONS
Please repeat labs prior to visit.   You must fast 8 to 10 hours.  Lab orders are in the system which you can complete at any [facility] lab.  Please follow-up for a visit after your lab work is complete.

## 2023-06-03 ENCOUNTER — HOSPITAL ENCOUNTER (OUTPATIENT)
Dept: LAB | Facility: MEDICAL CENTER | Age: 63
End: 2023-06-03
Attending: FAMILY MEDICINE
Payer: COMMERCIAL

## 2023-06-03 DIAGNOSIS — R77.9 ELEVATED BLOOD PROTEIN: ICD-10-CM

## 2023-06-03 DIAGNOSIS — R73.03 PREDIABETES: ICD-10-CM

## 2023-06-03 DIAGNOSIS — R74.8 ALKALINE PHOSPHATASE ELEVATION: ICD-10-CM

## 2023-06-03 DIAGNOSIS — E78.5 DYSLIPIDEMIA: ICD-10-CM

## 2023-06-03 DIAGNOSIS — I10 ESSENTIAL HYPERTENSION: ICD-10-CM

## 2023-06-03 DIAGNOSIS — R77.1 HIGH GLOBULIN LEVEL: ICD-10-CM

## 2023-06-03 LAB
25(OH)D3 SERPL-MCNC: 28 NG/ML (ref 30–100)
ALBUMIN SERPL BCP-MCNC: 4.8 G/DL (ref 3.2–4.9)
ALBUMIN/GLOB SERPL: 1.1 G/DL
ALP SERPL-CCNC: 180 U/L (ref 30–99)
ALT SERPL-CCNC: 26 U/L (ref 2–50)
ANION GAP SERPL CALC-SCNC: 10 MMOL/L (ref 7–16)
AST SERPL-CCNC: 29 U/L (ref 12–45)
BASOPHILS # BLD AUTO: 1.4 % (ref 0–1.8)
BASOPHILS # BLD: 0.06 K/UL (ref 0–0.12)
BILIRUB SERPL-MCNC: 0.5 MG/DL (ref 0.1–1.5)
BUN SERPL-MCNC: 16 MG/DL (ref 8–22)
CALCIUM ALBUM COR SERPL-MCNC: 9.5 MG/DL (ref 8.5–10.5)
CALCIUM SERPL-MCNC: 10.1 MG/DL (ref 8.5–10.5)
CHLORIDE SERPL-SCNC: 103 MMOL/L (ref 96–112)
CHOLEST SERPL-MCNC: 146 MG/DL (ref 100–199)
CO2 SERPL-SCNC: 27 MMOL/L (ref 20–33)
CREAT SERPL-MCNC: 0.55 MG/DL (ref 0.5–1.4)
EOSINOPHIL # BLD AUTO: 0.18 K/UL (ref 0–0.51)
EOSINOPHIL NFR BLD: 4.3 % (ref 0–6.9)
ERYTHROCYTE [DISTWIDTH] IN BLOOD BY AUTOMATED COUNT: 44.2 FL (ref 35.9–50)
EST. AVERAGE GLUCOSE BLD GHB EST-MCNC: 117 MG/DL
GFR SERPLBLD CREATININE-BSD FMLA CKD-EPI: 104 ML/MIN/1.73 M 2
GLOBULIN SER CALC-MCNC: 4.2 G/DL (ref 1.9–3.5)
GLUCOSE SERPL-MCNC: 116 MG/DL (ref 65–99)
HBA1C MFR BLD: 5.7 % (ref 4–5.6)
HCT VFR BLD AUTO: 48.4 % (ref 37–47)
HDLC SERPL-MCNC: 53 MG/DL
HGB BLD-MCNC: 15.9 G/DL (ref 12–16)
IMM GRANULOCYTES # BLD AUTO: 0 K/UL (ref 0–0.11)
IMM GRANULOCYTES NFR BLD AUTO: 0 % (ref 0–0.9)
LDLC SERPL CALC-MCNC: 79 MG/DL
LYMPHOCYTES # BLD AUTO: 1.65 K/UL (ref 1–4.8)
LYMPHOCYTES NFR BLD: 39.8 % (ref 22–41)
MCH RBC QN AUTO: 29.8 PG (ref 27–33)
MCHC RBC AUTO-ENTMCNC: 32.9 G/DL (ref 32.2–35.5)
MCV RBC AUTO: 90.8 FL (ref 81.4–97.8)
MONOCYTES # BLD AUTO: 0.46 K/UL (ref 0–0.85)
MONOCYTES NFR BLD AUTO: 11.1 % (ref 0–13.4)
NEUTROPHILS # BLD AUTO: 1.8 K/UL (ref 1.82–7.42)
NEUTROPHILS NFR BLD: 43.4 % (ref 44–72)
NRBC # BLD AUTO: 0 K/UL
NRBC BLD-RTO: 0 /100 WBC (ref 0–0.2)
PLATELET # BLD AUTO: 338 K/UL (ref 164–446)
PMV BLD AUTO: 10.2 FL (ref 9–12.9)
POTASSIUM SERPL-SCNC: 4.3 MMOL/L (ref 3.6–5.5)
PROT SERPL-MCNC: 9 G/DL (ref 6–8.2)
RBC # BLD AUTO: 5.33 M/UL (ref 4.2–5.4)
SODIUM SERPL-SCNC: 140 MMOL/L (ref 135–145)
TRIGL SERPL-MCNC: 72 MG/DL (ref 0–149)
TSH SERPL DL<=0.005 MIU/L-ACNC: 3.05 UIU/ML (ref 0.38–5.33)
WBC # BLD AUTO: 4.2 K/UL (ref 4.8–10.8)

## 2023-06-03 PROCEDURE — 84080 ASSAY ALKALINE PHOSPHATASES: CPT

## 2023-06-03 PROCEDURE — 82306 VITAMIN D 25 HYDROXY: CPT

## 2023-06-03 PROCEDURE — 80053 COMPREHEN METABOLIC PANEL: CPT

## 2023-06-03 PROCEDURE — 84075 ASSAY ALKALINE PHOSPHATASE: CPT

## 2023-06-03 PROCEDURE — 85025 COMPLETE CBC W/AUTO DIFF WBC: CPT

## 2023-06-03 PROCEDURE — 36415 COLL VENOUS BLD VENIPUNCTURE: CPT

## 2023-06-03 PROCEDURE — 82977 ASSAY OF GGT: CPT

## 2023-06-03 PROCEDURE — 84443 ASSAY THYROID STIM HORMONE: CPT

## 2023-06-03 PROCEDURE — 80061 LIPID PANEL: CPT

## 2023-06-03 PROCEDURE — 83036 HEMOGLOBIN GLYCOSYLATED A1C: CPT

## 2023-06-07 LAB
ALP BONE SERPL-CCNC: 96 U/L (ref 0–55)
ALP ISOS SERPL HS-CCNC: 0 U/L
ALP LIVER SERPL-CCNC: 96 U/L (ref 0–94)
ALP SERPL-CCNC: 192 U/L (ref 40–120)
TEST NAME 95000: NORMAL

## 2023-06-14 ENCOUNTER — OFFICE VISIT (OUTPATIENT)
Dept: MEDICAL GROUP | Facility: IMAGING CENTER | Age: 63
End: 2023-06-14
Payer: COMMERCIAL

## 2023-06-14 VITALS
TEMPERATURE: 98.3 F | OXYGEN SATURATION: 98 % | DIASTOLIC BLOOD PRESSURE: 50 MMHG | WEIGHT: 107.4 LBS | HEIGHT: 61 IN | HEART RATE: 60 BPM | RESPIRATION RATE: 17 BRPM | BODY MASS INDEX: 20.28 KG/M2 | SYSTOLIC BLOOD PRESSURE: 108 MMHG

## 2023-06-14 DIAGNOSIS — R73.09 ELEVATED HEMOGLOBIN A1C: ICD-10-CM

## 2023-06-14 DIAGNOSIS — R77.1 HIGH GLOBULIN LEVEL: ICD-10-CM

## 2023-06-14 DIAGNOSIS — D72.819 LEUKOPENIA, UNSPECIFIED TYPE: ICD-10-CM

## 2023-06-14 DIAGNOSIS — E78.5 DYSLIPIDEMIA: ICD-10-CM

## 2023-06-14 DIAGNOSIS — R74.8 ALKALINE PHOSPHATASE ELEVATION: ICD-10-CM

## 2023-06-14 DIAGNOSIS — E55.9 VITAMIN D INSUFFICIENCY: ICD-10-CM

## 2023-06-14 DIAGNOSIS — D47.Z2 CASTLEMAN DISEASE (HCC): ICD-10-CM

## 2023-06-14 DIAGNOSIS — R77.9 ELEVATED BLOOD PROTEIN: ICD-10-CM

## 2023-06-14 DIAGNOSIS — I10 ESSENTIAL HYPERTENSION: ICD-10-CM

## 2023-06-14 DIAGNOSIS — R73.03 PREDIABETES: ICD-10-CM

## 2023-06-14 PROCEDURE — 99214 OFFICE O/P EST MOD 30 MIN: CPT | Performed by: FAMILY MEDICINE

## 2023-06-14 PROCEDURE — 3078F DIAST BP <80 MM HG: CPT | Performed by: FAMILY MEDICINE

## 2023-06-14 PROCEDURE — 3074F SYST BP LT 130 MM HG: CPT | Performed by: FAMILY MEDICINE

## 2023-06-14 ASSESSMENT — FIBROSIS 4 INDEX: FIB4 SCORE: 1.03

## 2023-06-14 ASSESSMENT — PAIN SCALES - GENERAL: PAINLEVEL: NO PAIN

## 2023-06-14 NOTE — PROGRESS NOTES
SUBJECTIVE:    Chief Complaint   Patient presents with    Lab Results     Alkaline phosphatase elevation        HPI:     Martha Oliver is a 63 y.o. female with Castleman's disease diagnosed 2015 here for follow up of alkaline phosphatase levels- liver and bone enzymes elevated.     Elevated alkaline phosphatase levels- liver and bone enzymes elevated.   Has appointment with hematology/oncology, Dr. Dallas, July 18th.   Total protein and globulin levels elevated.  Prior SPEP lab stable from prior.   She will plan to review with heme/onc at this time. She does follow up routinely for Castleman disease.   No new symptoms, feeling her usual self.   No enlarged lymph nodes or pain symptoms.     WBC - mildly low. Mild.     Prediabetes- Elevated glucose and a1c elevated.   States she stopped eating candy and is walking more.     Dyslipidemia- simvastatin 20 mg. Stable labs.     Hypertension- BP stable, nifedipine 60 mg daily.     Vitamin D low- 1000 IU daily.   Plan to increase to 2000 IU daily.     ROS:  No recent fevers or chills. No nausea or vomiting. No diarrhea. No chest pains or shortness of breath. No lower extremity edema.    Current Outpatient Medications on File Prior to Visit   Medication Sig Dispense Refill    simvastatin (ZOCOR) 20 MG Tab Take 1 Tablet by mouth every evening. 90 Tablet 3    NIFEdipine (ADALAT CC) 60 MG CR tablet Take 1 Tablet by mouth every day. 90 Tablet 3    Ascorbic Acid (VITAMIN C PO) Take  by mouth.      Omega-3 Fatty Acids (FISH OIL) 1000 MG CAPS capsule Take 1,000 mg by mouth 3 times a day, with meals.      vitamin D (CHOLECALCIFEROL) 1000 UNIT TABS Take 2,000 Units by mouth every day.       No current facility-administered medications on file prior to visit.       No Known Allergies    Patient Active Problem List    Diagnosis Date Noted    Prediabetes 02/07/2023    Alkaline phosphatase elevation 02/07/2023    High globulin level 02/07/2023    Neck nodule 02/07/2023    Essential  "hypertension 10/21/2015    Ataxia 10/21/2015    Castleman disease (HCC) 02/12/2015    Enlarged lymph nodes 12/02/2014    Dysarthria     Impaired fasting blood sugar 07/17/2014    HTN (hypertension)     Dyslipidemia        Past Medical History:   Diagnosis Date    Ataxia     no neurologic etiology found    Dysarthria     no neurologic etiology found    Dyslipidemia     HTN (hypertension)     Hypertension          OBJECTIVE:   /50 (BP Location: Left arm, Patient Position: Sitting, BP Cuff Size: Adult)   Pulse 60   Temp 36.8 °C (98.3 °F) (Temporal)   Resp 17   Ht 1.549 m (5' 1\")   Wt 48.7 kg (107 lb 6.4 oz)   LMP 08/21/2014   SpO2 98%   BMI 20.29 kg/m²   General: Well-developed well-nourished female, no acute distress  Neck: supple, no lymphadenopathy- cervical or supraclavicular, no thyromegaly  Cardiovascular: regular rate and rhythm, no murmurs, gallops, rubs  Lungs: clear to auscultation bilaterally, no wheezes, crackles, or rhonchi  Abdomen: +bowel sounds, soft, nontender, nondistended, no rebound, no guarding, no hepatosplenomegaly  Extremities: no cyanosis, clubbing, edema. No axillary or inguinal lymphadenopathy.   Skin: Warm and dry  Psych: appropriate mood and affect       Latest Reference Range & Units 06/03/23 08:20   WBC 4.8 - 10.8 K/uL 4.2 (L)   RBC 4.20 - 5.40 M/uL 5.33   Hemoglobin 12.0 - 16.0 g/dL 15.9   Hematocrit 37.0 - 47.0 % 48.4 (H)   MCV 81.4 - 97.8 fL 90.8   MCH 27.0 - 33.0 pg 29.8   MCHC 32.2 - 35.5 g/dL 32.9   RDW 35.9 - 50.0 fL 44.2   Platelet Count 164 - 446 K/uL 338   MPV 9.0 - 12.9 fL 10.2   Neutrophils-Polys 44.00 - 72.00 % 43.40 (L)   Neutrophils (Absolute) 1.82 - 7.42 K/uL 1.80 (L)   Lymphocytes 22.00 - 41.00 % 39.80   Lymphs (Absolute) 1.00 - 4.80 K/uL 1.65   Monocytes 0.00 - 13.40 % 11.10   Monos (Absolute) 0.00 - 0.85 K/uL 0.46   Eosinophils 0.00 - 6.90 % 4.30   Eos (Absolute) 0.00 - 0.51 K/uL 0.18   Basophils 0.00 - 1.80 % 1.40   Baso (Absolute) 0.00 - 0.12 K/uL 0.06 "   Immature Granulocytes 0.00 - 0.90 % 0.00   Immature Granulocytes (abs) 0.00 - 0.11 K/uL 0.00   Nucleated RBC 0.00 - 0.20 /100 WBC 0.00   NRBC (Absolute) K/uL 0.00   Sodium 135 - 145 mmol/L 140   Potassium 3.6 - 5.5 mmol/L 4.3   Chloride 96 - 112 mmol/L 103   Co2 20 - 33 mmol/L 27   Anion Gap 7.0 - 16.0  10.0   Glucose 65 - 99 mg/dL 116 (H)   Bun 8 - 22 mg/dL 16   Creatinine 0.50 - 1.40 mg/dL 0.55   GFR (CKD-EPI) >60 mL/min/1.73 m 2 104   Calcium 8.5 - 10.5 mg/dL 10.1   Correct Calcium 8.5 - 10.5 mg/dL 9.5   AST(SGOT) 12 - 45 U/L 29   ALT(SGPT) 2 - 50 U/L 26   Alkaline Phosphatase 30 - 99 U/L 180 (H)   Total Bilirubin 0.1 - 1.5 mg/dL 0.5   Albumin 3.2 - 4.9 g/dL 4.8   Total Protein 6.0 - 8.2 g/dL 9.0 (H)   Globulin 1.9 - 3.5 g/dL 4.2 (H)   A-G Ratio g/dL 1.1   Glycohemoglobin 4.0 - 5.6 % 5.7 (H)   Estim. Avg Glu mg/dL 117   Cholesterol,Tot 100 - 199 mg/dL 146   Triglycerides 0 - 149 mg/dL 72   HDL >=40 mg/dL 53   LDL <100 mg/dL 79   Alkaline Phosphatase 40 - 120 U/L 192 (H)   Bone Fractions 0 - 55 U/L 96 (H)   Liver Fractions 0 - 94 U/L 96 (H)   Alk Phos Other Calc U/L 0   Miscellaneous Lab Result  SEE NOTE   25-Hydroxy   Vitamin D 25 30 - 100 ng/mL 28 (L)   TSH 0.380 - 5.330 uIU/mL 3.050   (L): Data is abnormally low  (H): Data is abnormally high        ASSESSMENT/PLAN:    63 y.o.female     1. Alkaline phosphatase elevation   Patient will discuss further with heme/onc at this time. Plan for GI referral and consider endocrinology referral as needed.        2. High globulin level- as above.       3. Elevated blood protein - as above.        4. Castleman disease (HCC)   Follow up with hematology/oncology.        5. Leukopenia, unspecified type - mild. Monitor in future.        6. Prediabetes- mild.   Modify diet. Recommend routine exercise. Monitor in future.        7. Elevated hemoglobin A1c - mild. Monitor.        8. Dyslipidemia - stable, continue current medication.   Monitor labs in future.        9. Essential  hypertension - BP slightly on lower end today.   Monitor. Consider decrease nifedipine dose if continued low BP. Monitor.        10. Vitamin D insufficiency   Increase to vitamin D3, 2000 IU daily. Monitor in future.        Recommend heart healthy/low cholesterol/high fiber/low sugar/low processed food diet and routine exercise.     Follow up sooner as needed.   Return in about 2 months (around 8/14/2023).    This medical record contains text that has been entered with the assistance of computer voice recognition and dictation software.  Therefore, it may contain unintended errors in text, spelling, punctuation, or grammar.

## 2023-08-18 ENCOUNTER — OFFICE VISIT (OUTPATIENT)
Dept: MEDICAL GROUP | Facility: IMAGING CENTER | Age: 63
End: 2023-08-18
Payer: COMMERCIAL

## 2023-08-18 VITALS
RESPIRATION RATE: 16 BRPM | HEIGHT: 61 IN | TEMPERATURE: 98.1 F | DIASTOLIC BLOOD PRESSURE: 60 MMHG | HEART RATE: 60 BPM | BODY MASS INDEX: 20.24 KG/M2 | OXYGEN SATURATION: 97 % | WEIGHT: 107.2 LBS | SYSTOLIC BLOOD PRESSURE: 118 MMHG

## 2023-08-18 DIAGNOSIS — R77.1 HIGH GLOBULIN LEVEL: ICD-10-CM

## 2023-08-18 DIAGNOSIS — D47.Z2 CASTLEMAN DISEASE (HCC): ICD-10-CM

## 2023-08-18 DIAGNOSIS — I10 ESSENTIAL HYPERTENSION: ICD-10-CM

## 2023-08-18 DIAGNOSIS — R73.03 PREDIABETES: ICD-10-CM

## 2023-08-18 DIAGNOSIS — D72.819 LEUKOPENIA, UNSPECIFIED TYPE: ICD-10-CM

## 2023-08-18 DIAGNOSIS — E78.5 DYSLIPIDEMIA: ICD-10-CM

## 2023-08-18 DIAGNOSIS — E55.9 VITAMIN D INSUFFICIENCY: ICD-10-CM

## 2023-08-18 DIAGNOSIS — R74.8 ALKALINE PHOSPHATASE ELEVATION: ICD-10-CM

## 2023-08-18 DIAGNOSIS — R77.9 ELEVATED BLOOD PROTEIN: ICD-10-CM

## 2023-08-18 PROCEDURE — 99214 OFFICE O/P EST MOD 30 MIN: CPT | Performed by: FAMILY MEDICINE

## 2023-08-18 PROCEDURE — 3074F SYST BP LT 130 MM HG: CPT | Performed by: FAMILY MEDICINE

## 2023-08-18 PROCEDURE — 3078F DIAST BP <80 MM HG: CPT | Performed by: FAMILY MEDICINE

## 2023-08-18 PROCEDURE — 1126F AMNT PAIN NOTED NONE PRSNT: CPT | Performed by: FAMILY MEDICINE

## 2023-08-18 ASSESSMENT — FIBROSIS 4 INDEX: FIB4 SCORE: 1.03

## 2023-08-18 ASSESSMENT — PAIN SCALES - GENERAL: PAINLEVEL: NO PAIN

## 2023-08-18 NOTE — PROGRESS NOTES
SUBJECTIVE:    Chief Complaint   Patient presents with    Follow-Up     Pt states Dr. Dallas ordered CT due to alkaline levels being high.        HPI:     Martha Oliver is a 63 y.o. female   with Castleman's disease diagnosed 2015 here for follow up of alkaline phosphatase levels, liver and one isoenzymes elevated.   She has been otherwise asymptomatic.   PET scan ordered per Dr. Dallas for elevated levels, Wed scheduled.   Follow up Sept 20th scheduled as well.   Prior SPEP with elevated gamma globulin, but stable compared to 8 years ago.   RUQ u/s unremarkable.   Vitamin D low- on supplement.   Has simvastatin for cholesterol and nifedipine for blood pressure.       ROS:  No recent fevers or chills. No nausea or vomiting. No diarrhea. No chest pains or shortness of breath. No lower extremity edema.    Current Outpatient Medications on File Prior to Visit   Medication Sig Dispense Refill    simvastatin (ZOCOR) 20 MG Tab Take 1 Tablet by mouth every evening. 90 Tablet 3    NIFEdipine (ADALAT CC) 60 MG CR tablet Take 1 Tablet by mouth every day. 90 Tablet 3    Ascorbic Acid (VITAMIN C PO) Take  by mouth.      Omega-3 Fatty Acids (FISH OIL) 1000 MG CAPS capsule Take 1,000 mg by mouth 3 times a day, with meals.      vitamin D (CHOLECALCIFEROL) 1000 UNIT TABS Take 2,000 Units by mouth every day.       No current facility-administered medications on file prior to visit.       No Known Allergies    Patient Active Problem List    Diagnosis Date Noted    Prediabetes 02/07/2023    Alkaline phosphatase elevation 02/07/2023    High globulin level 02/07/2023    Neck nodule 02/07/2023    Essential hypertension 10/21/2015    Ataxia 10/21/2015    Castleman disease (HCC) 02/12/2015    Enlarged lymph nodes 12/02/2014    Dysarthria     Impaired fasting blood sugar 07/17/2014    HTN (hypertension)     Dyslipidemia        Past Medical History:   Diagnosis Date    Ataxia     no neurologic etiology found    Dysarthria     no neurologic  "etiology found    Dyslipidemia     HTN (hypertension)     Hypertension          OBJECTIVE:   /60 (BP Location: Left arm, Patient Position: Sitting, BP Cuff Size: Adult)   Pulse 60   Temp 36.7 °C (98.1 °F) (Temporal)   Resp 16   Ht 1.549 m (5' 1\")   Wt 48.6 kg (107 lb 3.2 oz)   LMP 08/21/2014   SpO2 97%   Breastfeeding No   BMI 20.26 kg/m²   General: Well-developed well-nourished female, no acute distress  Neck: supple, no lymphadenopathy- cervical or supraclavicular, no thyromegaly  Cardiovascular: regular rate and rhythm, no murmurs, gallops, rubs  Lungs: clear to auscultation bilaterally, no wheezes, crackles, or rhonchi  Abdomen: +bowel sounds, soft, nontender, nondistended, no rebound, no guarding, no hepatosplenomegaly  Extremities: no cyanosis, clubbing, edema.  Skin: Warm and dry  Psych: appropriate mood and affect     Latest Reference Range & Units 06/03/23 08:20   WBC 4.8 - 10.8 K/uL 4.2 (L)   RBC 4.20 - 5.40 M/uL 5.33   Hemoglobin 12.0 - 16.0 g/dL 15.9   Hematocrit 37.0 - 47.0 % 48.4 (H)   MCV 81.4 - 97.8 fL 90.8   MCH 27.0 - 33.0 pg 29.8   MCHC 32.2 - 35.5 g/dL 32.9   RDW 35.9 - 50.0 fL 44.2   Platelet Count 164 - 446 K/uL 338   MPV 9.0 - 12.9 fL 10.2   Neutrophils-Polys 44.00 - 72.00 % 43.40 (L)   Neutrophils (Absolute) 1.82 - 7.42 K/uL 1.80 (L)   Lymphocytes 22.00 - 41.00 % 39.80   Lymphs (Absolute) 1.00 - 4.80 K/uL 1.65   Monocytes 0.00 - 13.40 % 11.10   Monos (Absolute) 0.00 - 0.85 K/uL 0.46   Eosinophils 0.00 - 6.90 % 4.30   Eos (Absolute) 0.00 - 0.51 K/uL 0.18   Basophils 0.00 - 1.80 % 1.40   Baso (Absolute) 0.00 - 0.12 K/uL 0.06   Immature Granulocytes 0.00 - 0.90 % 0.00   Immature Granulocytes (abs) 0.00 - 0.11 K/uL 0.00   Nucleated RBC 0.00 - 0.20 /100 WBC 0.00   NRBC (Absolute) K/uL 0.00   Sodium 135 - 145 mmol/L 140   Potassium 3.6 - 5.5 mmol/L 4.3   Chloride 96 - 112 mmol/L 103   Co2 20 - 33 mmol/L 27   Anion Gap 7.0 - 16.0  10.0   Glucose 65 - 99 mg/dL 116 (H)   Bun 8 - 22 " mg/dL 16   Creatinine 0.50 - 1.40 mg/dL 0.55   GFR (CKD-EPI) >60 mL/min/1.73 m 2 104   Calcium 8.5 - 10.5 mg/dL 10.1   Correct Calcium 8.5 - 10.5 mg/dL 9.5   AST(SGOT) 12 - 45 U/L 29   ALT(SGPT) 2 - 50 U/L 26   Alkaline Phosphatase 30 - 99 U/L 180 (H)   Total Bilirubin 0.1 - 1.5 mg/dL 0.5   Albumin 3.2 - 4.9 g/dL 4.8   Total Protein 6.0 - 8.2 g/dL 9.0 (H)   Globulin 1.9 - 3.5 g/dL 4.2 (H)   A-G Ratio g/dL 1.1   Glycohemoglobin 4.0 - 5.6 % 5.7 (H)   Estim. Avg Glu mg/dL 117   Cholesterol,Tot 100 - 199 mg/dL 146   Triglycerides 0 - 149 mg/dL 72   HDL >=40 mg/dL 53   LDL <100 mg/dL 79   Alkaline Phosphatase 40 - 120 U/L 192 (H)   Bone Fractions 0 - 55 U/L 96 (H)   Liver Fractions 0 - 94 U/L 96 (H)   Alk Phos Other Calc U/L 0   Miscellaneous Lab Result  SEE NOTE   25-Hydroxy   Vitamin D 25 30 - 100 ng/mL 28 (L)   TSH 0.380 - 5.330 uIU/mL 3.050   (L): Data is abnormally low  (H): Data is abnormally high      ASSESSMENT/PLAN:    63 y.o.female       1. Alkaline phosphatase elevation - liver and bone isoenzymes elevated. Liver u/s unremarkable.   Assess further lab work.   Has PET scan scheduled with heme/onc. Consider other bone scan as needed.   Will refer to GI for further evaluation.  Referral to Gastroenterology   Comp Metabolic Panel   5' NUCLEOTIDASE   PTH INTACT (PTH ONLY)   GAMMA GT (GGT)   ALPHA-1-ANTITRYPSIN   ANTI-SMOOTH MUSCLE ABS   CERULOPLASMIN   FERRITIN   IRON/TOTAL IRON BIND   SHARON REFLEXIVE PROFILE                     2. High globulin level   Prior SPEP with elevated gamma globulin, but stable compared to 8 years ago.   Patient to follow up with hematology/oncology.  PROTEIN ELECTROPHORESIS, UR      3. Elevated blood protein - as above.  PROTEIN ELECTROPHORESIS, UR      4. Vitamin D insufficiency - on supplement. Monitor.  VITAMIN D,25 HYDROXY (DEFICIENCY)      5. Essential hypertension - stable, continue current medication. Monitor.  CBC WITH DIFFERENTIAL      6. Dyslipidemia - stable, continue current  medication. Monitor.  Lipid Profile      7. Prediabetes   Monitor labs.  Comp Metabolic Panel    HEMOGLOBIN A1C      8. Leukopenia, unspecified type - mild. Monitor.  CBC WITH DIFFERENTIAL        9. Castleman disease (HCC) - as above.   Follow up with hematology/oncology.          Follow up sooner as needed.   Return in about 3 months (around 11/18/2023).    This medical record contains text that has been entered with the assistance of computer voice recognition and dictation software.  Therefore, it may contain unintended errors in text, spelling, punctuation, or grammar.

## 2023-08-23 ENCOUNTER — HOSPITAL ENCOUNTER (OUTPATIENT)
Dept: RADIOLOGY | Facility: MEDICAL CENTER | Age: 63
End: 2023-08-23
Attending: INTERNAL MEDICINE
Payer: COMMERCIAL

## 2023-08-23 DIAGNOSIS — D47.Z2 ANGIOLYMPHOID HYPERPLASIA (HCC): ICD-10-CM

## 2023-08-23 PROCEDURE — A9552 F18 FDG: HCPCS

## 2023-09-20 ENCOUNTER — HOSPITAL ENCOUNTER (OUTPATIENT)
Dept: RADIOLOGY | Facility: MEDICAL CENTER | Age: 63
End: 2023-09-20
Attending: FAMILY MEDICINE
Payer: COMMERCIAL

## 2023-09-20 DIAGNOSIS — Z12.31 VISIT FOR SCREENING MAMMOGRAM: ICD-10-CM

## 2023-09-20 PROCEDURE — 77063 BREAST TOMOSYNTHESIS BI: CPT

## 2023-09-26 ENCOUNTER — IMMUNIZATION (OUTPATIENT)
Dept: OCCUPATIONAL MEDICINE | Facility: CLINIC | Age: 63
End: 2023-09-26

## 2023-09-26 DIAGNOSIS — Z23 NEED FOR VACCINATION: Primary | ICD-10-CM

## 2023-09-26 PROCEDURE — 90686 IIV4 VACC NO PRSV 0.5 ML IM: CPT | Performed by: PREVENTIVE MEDICINE

## 2023-11-18 ENCOUNTER — HOSPITAL ENCOUNTER (OUTPATIENT)
Dept: LAB | Facility: MEDICAL CENTER | Age: 63
End: 2023-11-18
Payer: COMMERCIAL

## 2023-11-18 ENCOUNTER — HOSPITAL ENCOUNTER (OUTPATIENT)
Dept: LAB | Facility: MEDICAL CENTER | Age: 63
End: 2023-11-18
Attending: FAMILY MEDICINE
Payer: COMMERCIAL

## 2023-11-18 DIAGNOSIS — R74.8 ALKALINE PHOSPHATASE ELEVATION: ICD-10-CM

## 2023-11-18 DIAGNOSIS — E55.9 VITAMIN D INSUFFICIENCY: ICD-10-CM

## 2023-11-18 DIAGNOSIS — I10 ESSENTIAL HYPERTENSION: ICD-10-CM

## 2023-11-18 DIAGNOSIS — R77.9 ELEVATED BLOOD PROTEIN: ICD-10-CM

## 2023-11-18 DIAGNOSIS — E78.5 DYSLIPIDEMIA: ICD-10-CM

## 2023-11-18 DIAGNOSIS — R73.03 PREDIABETES: ICD-10-CM

## 2023-11-18 DIAGNOSIS — R77.1 HIGH GLOBULIN LEVEL: ICD-10-CM

## 2023-11-18 DIAGNOSIS — D72.819 LEUKOPENIA, UNSPECIFIED TYPE: ICD-10-CM

## 2023-11-18 LAB
25(OH)D3 SERPL-MCNC: 38 NG/ML (ref 30–100)
ALBUMIN SERPL BCP-MCNC: 4.9 G/DL (ref 3.2–4.9)
ALBUMIN SERPL BCP-MCNC: 5 G/DL (ref 3.2–4.9)
ALBUMIN/GLOB SERPL: 1.2 G/DL
ALBUMIN/GLOB SERPL: 1.2 G/DL
ALP SERPL-CCNC: 174 U/L (ref 30–99)
ALP SERPL-CCNC: 176 U/L (ref 30–99)
ALT SERPL-CCNC: 25 U/L (ref 2–50)
ALT SERPL-CCNC: 26 U/L (ref 2–50)
ANION GAP SERPL CALC-SCNC: 12 MMOL/L (ref 7–16)
ANION GAP SERPL CALC-SCNC: 12 MMOL/L (ref 7–16)
AST SERPL-CCNC: 30 U/L (ref 12–45)
AST SERPL-CCNC: 33 U/L (ref 12–45)
BASOPHILS # BLD AUTO: 1.2 % (ref 0–1.8)
BASOPHILS # BLD AUTO: 1.5 % (ref 0–1.8)
BASOPHILS # BLD: 0.05 K/UL (ref 0–0.12)
BASOPHILS # BLD: 0.06 K/UL (ref 0–0.12)
BILIRUB SERPL-MCNC: 0.4 MG/DL (ref 0.1–1.5)
BILIRUB SERPL-MCNC: 0.5 MG/DL (ref 0.1–1.5)
BUN SERPL-MCNC: 16 MG/DL (ref 8–22)
BUN SERPL-MCNC: 16 MG/DL (ref 8–22)
CALCIUM ALBUM COR SERPL-MCNC: 9.1 MG/DL (ref 8.5–10.5)
CALCIUM ALBUM COR SERPL-MCNC: 9.2 MG/DL (ref 8.5–10.5)
CALCIUM SERPL-MCNC: 9.9 MG/DL (ref 8.5–10.5)
CALCIUM SERPL-MCNC: 9.9 MG/DL (ref 8.5–10.5)
CHLORIDE SERPL-SCNC: 102 MMOL/L (ref 96–112)
CHLORIDE SERPL-SCNC: 102 MMOL/L (ref 96–112)
CHOLEST SERPL-MCNC: 130 MG/DL (ref 100–199)
CO2 SERPL-SCNC: 26 MMOL/L (ref 20–33)
CO2 SERPL-SCNC: 26 MMOL/L (ref 20–33)
CREAT SERPL-MCNC: 0.54 MG/DL (ref 0.5–1.4)
CREAT SERPL-MCNC: 0.55 MG/DL (ref 0.5–1.4)
EOSINOPHIL # BLD AUTO: 0.15 K/UL (ref 0–0.51)
EOSINOPHIL # BLD AUTO: 0.16 K/UL (ref 0–0.51)
EOSINOPHIL NFR BLD: 3.7 % (ref 0–6.9)
EOSINOPHIL NFR BLD: 4.1 % (ref 0–6.9)
ERYTHROCYTE [DISTWIDTH] IN BLOOD BY AUTOMATED COUNT: 41.6 FL (ref 35.9–50)
ERYTHROCYTE [DISTWIDTH] IN BLOOD BY AUTOMATED COUNT: 41.7 FL (ref 35.9–50)
EST. AVERAGE GLUCOSE BLD GHB EST-MCNC: 117 MG/DL
FASTING STATUS PATIENT QL REPORTED: NORMAL
FASTING STATUS PATIENT QL REPORTED: NORMAL
FERRITIN SERPL-MCNC: 192 NG/ML (ref 10–291)
FERRITIN SERPL-MCNC: 195 NG/ML (ref 10–291)
GFR SERPLBLD CREATININE-BSD FMLA CKD-EPI: 104 ML/MIN/1.73 M 2
GFR SERPLBLD CREATININE-BSD FMLA CKD-EPI: 104 ML/MIN/1.73 M 2
GGT SERPL-CCNC: 13 U/L (ref 7–34)
GGT SERPL-CCNC: 14 U/L (ref 7–34)
GLOBULIN SER CALC-MCNC: 4.2 G/DL (ref 1.9–3.5)
GLOBULIN SER CALC-MCNC: 4.2 G/DL (ref 1.9–3.5)
GLUCOSE SERPL-MCNC: 118 MG/DL (ref 65–99)
GLUCOSE SERPL-MCNC: 120 MG/DL (ref 65–99)
HBA1C MFR BLD: 5.7 % (ref 4–5.6)
HBV CORE AB SERPL QL IA: REACTIVE
HBV SURFACE AB SERPL IA-ACNC: ABNORMAL MIU/ML (ref 0–10)
HBV SURFACE AG SER QL: NORMAL
HCT VFR BLD AUTO: 48.2 % (ref 37–47)
HCT VFR BLD AUTO: 48.2 % (ref 37–47)
HCV AB SER QL: NORMAL
HDLC SERPL-MCNC: 49 MG/DL
HGB BLD-MCNC: 16 G/DL (ref 12–16)
HGB BLD-MCNC: 16.2 G/DL (ref 12–16)
IMM GRANULOCYTES # BLD AUTO: 0 K/UL (ref 0–0.11)
IMM GRANULOCYTES # BLD AUTO: 0 K/UL (ref 0–0.11)
IMM GRANULOCYTES NFR BLD AUTO: 0 % (ref 0–0.9)
IMM GRANULOCYTES NFR BLD AUTO: 0 % (ref 0–0.9)
INR PPP: 0.96 (ref 0.87–1.13)
IRON SATN MFR SERPL: 21 % (ref 15–55)
IRON SATN MFR SERPL: 21 % (ref 15–55)
IRON SERPL-MCNC: 65 UG/DL (ref 40–170)
IRON SERPL-MCNC: 67 UG/DL (ref 40–170)
LDLC SERPL CALC-MCNC: 57 MG/DL
LYMPHOCYTES # BLD AUTO: 1.54 K/UL (ref 1–4.8)
LYMPHOCYTES # BLD AUTO: 1.57 K/UL (ref 1–4.8)
LYMPHOCYTES NFR BLD: 39.1 % (ref 22–41)
LYMPHOCYTES NFR BLD: 39.2 % (ref 22–41)
MCH RBC QN AUTO: 29.2 PG (ref 27–33)
MCH RBC QN AUTO: 29.5 PG (ref 27–33)
MCHC RBC AUTO-ENTMCNC: 33.2 G/DL (ref 32.2–35.5)
MCHC RBC AUTO-ENTMCNC: 33.6 G/DL (ref 32.2–35.5)
MCV RBC AUTO: 87.8 FL (ref 81.4–97.8)
MCV RBC AUTO: 88 FL (ref 81.4–97.8)
MONOCYTES # BLD AUTO: 0.39 K/UL (ref 0–0.85)
MONOCYTES # BLD AUTO: 0.42 K/UL (ref 0–0.85)
MONOCYTES NFR BLD AUTO: 10.5 % (ref 0–13.4)
MONOCYTES NFR BLD AUTO: 9.9 % (ref 0–13.4)
NEUTROPHILS # BLD AUTO: 1.79 K/UL (ref 1.82–7.42)
NEUTROPHILS # BLD AUTO: 1.82 K/UL (ref 1.82–7.42)
NEUTROPHILS NFR BLD: 45.4 % (ref 44–72)
NEUTROPHILS NFR BLD: 45.4 % (ref 44–72)
NRBC # BLD AUTO: 0 K/UL
NRBC # BLD AUTO: 0 K/UL
NRBC BLD-RTO: 0 /100 WBC (ref 0–0.2)
NRBC BLD-RTO: 0 /100 WBC (ref 0–0.2)
PLATELET # BLD AUTO: 333 K/UL (ref 164–446)
PLATELET # BLD AUTO: 336 K/UL (ref 164–446)
PMV BLD AUTO: 9.8 FL (ref 9–12.9)
PMV BLD AUTO: 9.8 FL (ref 9–12.9)
POTASSIUM SERPL-SCNC: 4.2 MMOL/L (ref 3.6–5.5)
POTASSIUM SERPL-SCNC: 4.2 MMOL/L (ref 3.6–5.5)
PROT SERPL-MCNC: 9.1 G/DL (ref 6–8.2)
PROT SERPL-MCNC: 9.2 G/DL (ref 6–8.2)
PROTHROMBIN TIME: 12.9 SEC (ref 12–14.6)
PTH-INTACT SERPL-MCNC: 37.2 PG/ML (ref 14–72)
PTH-INTACT SERPL-MCNC: 38 PG/ML (ref 14–72)
RBC # BLD AUTO: 5.48 M/UL (ref 4.2–5.4)
RBC # BLD AUTO: 5.49 M/UL (ref 4.2–5.4)
SODIUM SERPL-SCNC: 140 MMOL/L (ref 135–145)
SODIUM SERPL-SCNC: 140 MMOL/L (ref 135–145)
T4 FREE SERPL-MCNC: 1.27 NG/DL (ref 0.93–1.7)
TIBC SERPL-MCNC: 314 UG/DL (ref 250–450)
TIBC SERPL-MCNC: 317 UG/DL (ref 250–450)
TRIGL SERPL-MCNC: 121 MG/DL (ref 0–149)
TSH SERPL DL<=0.005 MIU/L-ACNC: 1.44 UIU/ML (ref 0.38–5.33)
UIBC SERPL-MCNC: 249 UG/DL (ref 110–370)
UIBC SERPL-MCNC: 250 UG/DL (ref 110–370)
WBC # BLD AUTO: 3.9 K/UL (ref 4.8–10.8)
WBC # BLD AUTO: 4 K/UL (ref 4.8–10.8)

## 2023-11-18 PROCEDURE — 82465 ASSAY BLD/SERUM CHOLESTEROL: CPT

## 2023-11-18 PROCEDURE — 86235 NUCLEAR ANTIGEN ANTIBODY: CPT | Mod: 91

## 2023-11-18 PROCEDURE — 86704 HEP B CORE ANTIBODY TOTAL: CPT

## 2023-11-18 PROCEDURE — 82728 ASSAY OF FERRITIN: CPT | Mod: 91

## 2023-11-18 PROCEDURE — 85610 PROTHROMBIN TIME: CPT

## 2023-11-18 PROCEDURE — 84156 ASSAY OF PROTEIN URINE: CPT

## 2023-11-18 PROCEDURE — 83540 ASSAY OF IRON: CPT

## 2023-11-18 PROCEDURE — 86706 HEP B SURFACE ANTIBODY: CPT

## 2023-11-18 PROCEDURE — 84450 TRANSFERASE (AST) (SGOT): CPT

## 2023-11-18 PROCEDURE — 83550 IRON BINDING TEST: CPT

## 2023-11-18 PROCEDURE — 83970 ASSAY OF PARATHORMONE: CPT | Mod: 91

## 2023-11-18 PROCEDURE — 84080 ASSAY ALKALINE PHOSPHATASES: CPT

## 2023-11-18 PROCEDURE — 80061 LIPID PANEL: CPT

## 2023-11-18 PROCEDURE — 86038 ANTINUCLEAR ANTIBODIES: CPT

## 2023-11-18 PROCEDURE — 86038 ANTINUCLEAR ANTIBODIES: CPT | Mod: 91

## 2023-11-18 PROCEDURE — 86225 DNA ANTIBODY NATIVE: CPT | Mod: 91

## 2023-11-18 PROCEDURE — 85025 COMPLETE CBC W/AUTO DIFF WBC: CPT

## 2023-11-18 PROCEDURE — 86803 HEPATITIS C AB TEST: CPT

## 2023-11-18 PROCEDURE — 83915 ASSAY OF NUCLEOTIDASE: CPT

## 2023-11-18 PROCEDURE — 82947 ASSAY GLUCOSE BLOOD QUANT: CPT

## 2023-11-18 PROCEDURE — 82390 ASSAY OF CERULOPLASMIN: CPT | Mod: 91

## 2023-11-18 PROCEDURE — 80053 COMPREHEN METABOLIC PANEL: CPT

## 2023-11-18 PROCEDURE — 86225 DNA ANTIBODY NATIVE: CPT

## 2023-11-18 PROCEDURE — 84075 ASSAY ALKALINE PHOSPHATASE: CPT

## 2023-11-18 PROCEDURE — 86039 ANTINUCLEAR ANTIBODIES (ANA): CPT

## 2023-11-18 PROCEDURE — 86015 ACTIN ANTIBODY EACH: CPT

## 2023-11-18 PROCEDURE — 84478 ASSAY OF TRIGLYCERIDES: CPT

## 2023-11-18 PROCEDURE — 86708 HEPATITIS A ANTIBODY: CPT

## 2023-11-18 PROCEDURE — 84443 ASSAY THYROID STIM HORMONE: CPT

## 2023-11-18 PROCEDURE — 82977 ASSAY OF GGT: CPT | Mod: 91

## 2023-11-18 PROCEDURE — 84460 ALANINE AMINO (ALT) (SGPT): CPT

## 2023-11-18 PROCEDURE — 82172 ASSAY OF APOLIPOPROTEIN: CPT

## 2023-11-18 PROCEDURE — 86381 MITOCHONDRIAL ANTIBODY EACH: CPT

## 2023-11-18 PROCEDURE — 86039 ANTINUCLEAR ANTIBODIES (ANA): CPT | Mod: 91

## 2023-11-18 PROCEDURE — 82306 VITAMIN D 25 HYDROXY: CPT

## 2023-11-18 PROCEDURE — 82390 ASSAY OF CERULOPLASMIN: CPT

## 2023-11-18 PROCEDURE — 85025 COMPLETE CBC W/AUTO DIFF WBC: CPT | Mod: 91

## 2023-11-18 PROCEDURE — 83010 ASSAY OF HAPTOGLOBIN QUANT: CPT

## 2023-11-18 PROCEDURE — 87340 HEPATITIS B SURFACE AG IA: CPT

## 2023-11-18 PROCEDURE — 86015 ACTIN ANTIBODY EACH: CPT | Mod: 91

## 2023-11-18 PROCEDURE — 83970 ASSAY OF PARATHORMONE: CPT

## 2023-11-18 PROCEDURE — 82247 BILIRUBIN TOTAL: CPT

## 2023-11-18 PROCEDURE — 84166 PROTEIN E-PHORESIS/URINE/CSF: CPT

## 2023-11-18 PROCEDURE — 82104 ALPHA-1-ANTITRYPSIN PHENO: CPT

## 2023-11-18 PROCEDURE — 83550 IRON BINDING TEST: CPT | Mod: 91

## 2023-11-18 PROCEDURE — 86335 IMMUNFIX E-PHORSIS/URINE/CSF: CPT

## 2023-11-18 PROCEDURE — 82103 ALPHA-1-ANTITRYPSIN TOTAL: CPT

## 2023-11-18 PROCEDURE — 83540 ASSAY OF IRON: CPT | Mod: 91

## 2023-11-18 PROCEDURE — 82103 ALPHA-1-ANTITRYPSIN TOTAL: CPT | Mod: 91

## 2023-11-18 PROCEDURE — 36415 COLL VENOUS BLD VENIPUNCTURE: CPT

## 2023-11-18 PROCEDURE — 83883 ASSAY NEPHELOMETRY NOT SPEC: CPT

## 2023-11-18 PROCEDURE — 84439 ASSAY OF FREE THYROXINE: CPT

## 2023-11-18 PROCEDURE — 82977 ASSAY OF GGT: CPT

## 2023-11-18 PROCEDURE — 80053 COMPREHEN METABOLIC PANEL: CPT | Mod: 91

## 2023-11-18 PROCEDURE — 83036 HEMOGLOBIN GLYCOSYLATED A1C: CPT

## 2023-11-18 PROCEDURE — 82787 IGG 1 2 3 OR 4 EACH: CPT | Mod: 91

## 2023-11-18 PROCEDURE — 82728 ASSAY OF FERRITIN: CPT

## 2023-11-20 LAB
A1AT SERPL-MCNC: 149 MG/DL (ref 90–200)
CERULOPLASMIN SERPL-MCNC: 22 MG/DL (ref 16–45)
CERULOPLASMIN SERPL-MCNC: 22 MG/DL (ref 16–45)
HAV AB SER QL IA: POSITIVE
MITOCHONDRIA M2 IGG SER-ACNC: 4.6 UNITS (ref 0–24.9)
NUCLEAR IGG SER QL IA: DETECTED
NUCLEAR IGG SER QL IA: DETECTED
SMA IGG SER-ACNC: 4 UNITS (ref 0–19)
SMA IGG SER-ACNC: 6 UNITS (ref 0–19)

## 2023-11-21 LAB
5NT SERPL-CCNC: 6 U/L (ref 0–15)
A2 MACROGLOB SERPL-MCNC: 261 MG/DL (ref 110–276)
ALBUMIN 24H MFR UR ELPH: 66.1 %
ALP BONE SERPL-CCNC: 100 U/L (ref 0–55)
ALP ISOS SERPL HS-CCNC: 0 U/L
ALP LIVER SERPL-CCNC: 88 U/L (ref 0–94)
ALP SERPL-CCNC: 188 U/L (ref 40–120)
ALPHA1 GLOB 24H MFR UR ELPH: 4 %
ALPHA2 GLOB 24H MFR UR ELPH: 6.7 %
ALT SERPL W P-5'-P-CCNC: 27 IU/L (ref 0–40)
ANA INTERPRETIVE COMMENT Q5143: ABNORMAL
ANA INTERPRETIVE COMMENT Q5143: ABNORMAL
ANA PATTERN Q5144: ABNORMAL
ANA PATTERN Q5144: ABNORMAL
ANA TITER Q5145: ABNORMAL
ANA TITER Q5145: ABNORMAL
ANTINUCLEAR ANTIBODY (ANA), HEP-2, IGG Q5142: DETECTED
ANTINUCLEAR ANTIBODY (ANA), HEP-2, IGG Q5142: DETECTED
APO A-I SERPL-MCNC: 144 MG/DL (ref 116–209)
AST SERPL W P-5'-P-CCNC: 26 IU/L (ref 0–40)
B-GLOBULIN 24H MFR UR ELPH: 8.9 %
BILIRUB SERPL-MCNC: 0.4 MG/DL (ref 0–1.2)
CHOLEST SERPL-MCNC: 132 MG/DL (ref 100–199)
COLLECT DURATION TIME SPEC: NORMAL HRS
EER MONOCLONAL PROTEIN STUDY, 24 HOUR U Q5964: NORMAL
ENA JO1 AB TITR SER: 0 AU/ML (ref 0–40)
ENA JO1 AB TITR SER: 1 AU/ML (ref 0–40)
ENA SCL70 IGG SER QL: 1 AU/ML (ref 0–40)
ENA SCL70 IGG SER QL: 1 AU/ML (ref 0–40)
ENA SM IGG SER-ACNC: 2 AU/ML (ref 0–40)
ENA SM IGG SER-ACNC: 3 AU/ML (ref 0–40)
ENA SS-B IGG SER IA-ACNC: 0 AU/ML (ref 0–40)
ENA SS-B IGG SER IA-ACNC: 0 AU/ML (ref 0–40)
FIBROSIS SCORING 550107: ABNORMAL
FIBROSIS STAGE SERPL QL: ABNORMAL
GAMMA GLOB 24H MFR UR ELPH: 14.3 %
GGT SERPL-CCNC: 13 IU/L (ref 0–60)
GLUCOSE SERPL-MCNC: 122 MG/DL (ref 70–99)
HAPTOGLOB SERPL-MCNC: 101 MG/DL (ref 37–355)
IGG1 SER-MCNC: 1066 MG/DL (ref 240–1118)
IGG2 SER-MCNC: 602 MG/DL (ref 124–549)
IGG3 SER-MCNC: 106 MG/DL (ref 21–134)
IGG4 SER-MCNC: 332 MG/DL (ref 1–123)
INTERPRETATION UR IFE-IMP: NORMAL
LABORATORY COMMENT REPORT: ABNORMAL
LIVER FIBR SCORE SERPL CALC.FIBROSURE: 0.26 (ref 0–0.21)
LIVER STEATOSIS GRADE SERPL QL: ABNORMAL
LIVER STEATOSIS SCORE SERPL: 0.42 (ref 0–0.4)
M PROTEIN 24H MFR UR ELPH: 0 %
M PROTEIN 24H UR ELPH-MRATE: NORMAL MG/24 HRS
NASH GRADE SERPL QL: ABNORMAL
NASH INTERPRETATION SERPL-IMP: ABNORMAL
NASH SCORE SERPL: 0.56 (ref 0–0.25)
NASH SCORING Z4789: ABNORMAL
PROT 24H UR-MRATE: NORMAL MG/D (ref 40–150)
PROT UR-MCNC: 11 MG/DL
SPECIMEN VOL ?TM UR: NORMAL ML
SSA52 R0ENA AB IGG Q0420: 2 AU/ML (ref 0–40)
SSA52 R0ENA AB IGG Q0420: 2 AU/ML (ref 0–40)
SSA60 R0ENA AB IGG Q0419: 1 AU/ML (ref 0–40)
SSA60 R0ENA AB IGG Q0419: 1 AU/ML (ref 0–40)
STEATOSIS SCORING NL11718: ABNORMAL
TEST PERFORMANCE INFO SPEC: ABNORMAL
TEST PERFORMANCE INFO SPEC: ABNORMAL
TRIGL SERPL-MCNC: 126 MG/DL (ref 0–149)
U1 SNRNP IGG SER QL: 2 UNITS (ref 0–19)
U1 SNRNP IGG SER QL: 3 UNITS (ref 0–19)

## 2023-11-22 ENCOUNTER — OFFICE VISIT (OUTPATIENT)
Dept: MEDICAL GROUP | Facility: IMAGING CENTER | Age: 63
End: 2023-11-22
Payer: COMMERCIAL

## 2023-11-22 VITALS
HEIGHT: 61 IN | HEART RATE: 74 BPM | DIASTOLIC BLOOD PRESSURE: 80 MMHG | SYSTOLIC BLOOD PRESSURE: 110 MMHG | WEIGHT: 145 LBS | BODY MASS INDEX: 27.38 KG/M2 | OXYGEN SATURATION: 98 % | TEMPERATURE: 98 F | RESPIRATION RATE: 14 BRPM

## 2023-11-22 DIAGNOSIS — R71.8 ELEVATED HEMATOCRIT: ICD-10-CM

## 2023-11-22 DIAGNOSIS — I10 ESSENTIAL HYPERTENSION: ICD-10-CM

## 2023-11-22 DIAGNOSIS — R74.8 ALKALINE PHOSPHATASE ELEVATION: ICD-10-CM

## 2023-11-22 DIAGNOSIS — R89.9 ABNORMAL LABORATORY TEST RESULT: ICD-10-CM

## 2023-11-22 DIAGNOSIS — R77.1 HIGH GLOBULIN LEVEL: ICD-10-CM

## 2023-11-22 DIAGNOSIS — D72.819 LEUKOPENIA, UNSPECIFIED TYPE: ICD-10-CM

## 2023-11-22 DIAGNOSIS — R73.03 PREDIABETES: ICD-10-CM

## 2023-11-22 DIAGNOSIS — E78.5 DYSLIPIDEMIA: ICD-10-CM

## 2023-11-22 DIAGNOSIS — E55.9 VITAMIN D INSUFFICIENCY: ICD-10-CM

## 2023-11-22 DIAGNOSIS — R77.9 ELEVATED BLOOD PROTEIN: ICD-10-CM

## 2023-11-22 DIAGNOSIS — E83.52 HYPERCALCEMIA: ICD-10-CM

## 2023-11-22 DIAGNOSIS — D47.Z2 CASTLEMAN DISEASE (HCC): ICD-10-CM

## 2023-11-22 LAB
A1AT PHENOTYP SERPL-IMP: NORMAL
A1AT SERPL-MCNC: 149 MG/DL (ref 90–200)
DSDNA AB TITR SER CLIF: NORMAL {TITER}
DSDNA AB TITR SER CLIF: NORMAL {TITER}

## 2023-11-22 PROCEDURE — 99214 OFFICE O/P EST MOD 30 MIN: CPT | Performed by: FAMILY MEDICINE

## 2023-11-22 PROCEDURE — 99999 PR NO CHARGE: CPT | Performed by: FAMILY MEDICINE

## 2023-11-22 RX ORDER — SIMVASTATIN 20 MG
20 TABLET ORAL EVERY EVENING
Qty: 90 TABLET | Refills: 3 | Status: SHIPPED | OUTPATIENT
Start: 2023-11-22

## 2023-11-22 ASSESSMENT — FIBROSIS 4 INDEX: FIB4 SCORE: 0.92

## 2023-11-22 NOTE — PROGRESS NOTES
SUBJECTIVE:    Chief Complaint   Patient presents with    Follow-Up     Lab result 11/21    Medication Refill     Simvastatin and nifedipine        HPI:     Martha Oliver is a 63 y.o. female with Castleman's disease diagnosed 2015 here for follow up of medical issues.   Followed by heme/onc- Dr. Dallas.     Castleman's disease- has been stable.   9/2023- Dr. Dallas, PET scan. March appointment for follow up.   Chronically elevated total protein and globulin on labs going back to 2014.     Elevated alkaline phosphatase levels.   Prior labs with elevated liver and bone isoenzymes.   Seeing GI- had labs per Chinyere Fisher.   Vitamin D low- improved to 38 from 28.   Mildly elevated calcium levels- PTH normal.     H/h -mildly elevated, stable from prior.   Slightly more persistently elevated within past couple years.     Prediabetes- stable.   Hypertension- stable on nifedipine 60 mg daily.   Hyperlipidemia- stable on simvastatin 20 mg daily.    ROS:  No recent fevers or chills. No nausea or vomiting. No diarrhea. No chest pains or shortness of breath. No lower extremity edema.    Current Outpatient Medications on File Prior to Visit   Medication Sig Dispense Refill    simvastatin (ZOCOR) 20 MG Tab Take 1 Tablet by mouth every evening. 90 Tablet 3    NIFEdipine (ADALAT CC) 60 MG CR tablet Take 1 Tablet by mouth every day. 90 Tablet 3    Ascorbic Acid (VITAMIN C PO) Take  by mouth.      Omega-3 Fatty Acids (FISH OIL) 1000 MG CAPS capsule Take 1,000 mg by mouth 3 times a day, with meals.      vitamin D (CHOLECALCIFEROL) 1000 UNIT TABS Take 2,000 Units by mouth every day.       No current facility-administered medications on file prior to visit.       No Known Allergies    Patient Active Problem List    Diagnosis Date Noted    Prediabetes 02/07/2023    Alkaline phosphatase elevation 02/07/2023    High globulin level 02/07/2023    Neck nodule 02/07/2023    Essential hypertension 10/21/2015    Ataxia 10/21/2015    Castleman  "disease (HCC) 02/12/2015    Enlarged lymph nodes 12/02/2014    Dysarthria     Impaired fasting blood sugar 07/17/2014    HTN (hypertension)     Dyslipidemia        Past Medical History:   Diagnosis Date    Ataxia     no neurologic etiology found    Dysarthria     no neurologic etiology found    Dyslipidemia     HTN (hypertension)     Hypertension        OBJECTIVE:   /80 (BP Location: Left arm, Patient Position: Sitting, BP Cuff Size: Adult)   Pulse 74   Temp 36.7 °C (98 °F) (Temporal)   Resp 14   Ht 1.549 m (5' 1\")   Wt 65.8 kg (145 lb)   LMP 08/21/2014   SpO2 98%   BMI 27.40 kg/m²   General: Well-developed well-nourished female, no acute distress  Neck: supple, no lymphadenopathy- cervical or supraclavicular, no thyromegaly  Cardiovascular: regular rate and rhythm, no murmurs, gallops, rubs  Lungs: clear to auscultation bilaterally, no wheezes, crackles, or rhonchi  Abdomen: +bowel sounds, soft, nontender, nondistended, no rebound, no guarding, no hepatosplenomegaly  Extremities: no cyanosis, clubbing, edema  Skin: Warm and dry  Psych: appropriate mood and affect     Latest Reference Range & Units 11/18/23 09:27   Sodium 135 - 145 mmol/L 140   Potassium 3.6 - 5.5 mmol/L 4.2   Chloride 96 - 112 mmol/L 102   Co2 20 - 33 mmol/L 26   Anion Gap 7.0 - 16.0  12.0   Glucose 65 - 99 mg/dL 120 (H)   Bun 8 - 22 mg/dL 16   Creatinine 0.50 - 1.40 mg/dL 0.55   GFR (CKD-EPI) >60 mL/min/1.73 m 2 104   Calcium 8.5 - 10.5 mg/dL 9.9   Correct Calcium 8.5 - 10.5 mg/dL 9.1   AST(SGOT) 12 - 45 U/L 33   ALT(SGPT) 2 - 50 U/L 26   Alkaline Phosphatase 30 - 99 U/L 176 (H)   Total Bilirubin 0.1 - 1.5 mg/dL 0.4   Albumin 3.2 - 4.9 g/dL 5.0 (H)   Total Protein 6.0 - 8.2 g/dL 9.2 (H)   Globulin 1.9 - 3.5 g/dL 4.2 (H)   A-G Ratio g/dL 1.2   Gamma Gt 7 - 34 U/L 13   5' Nucleotidase, Serum 0 - 15 U/L 6   Iron 40 - 170 ug/dL 65   Total Iron Binding 250 - 450 ug/dL 314   % Saturation 15 - 55 % 21   Unsat Iron Binding 110 - 370 ug/dL " 249   Glycohemoglobin 4.0 - 5.6 % 5.7 (H)   Estim. Avg Glu mg/dL 117   Fasting Status  Fasting   Cholesterol,Tot 100 - 199 mg/dL 130   Triglycerides 0 - 149 mg/dL 121   HDL >=40 mg/dL 49   LDL <100 mg/dL 57   (H): Data is abnormally high    SHARON results pending.     ASSESSMENT/PLAN:    63 y.o.female       1. Castleman disease (HCC) -stable, had PET scan. Followed by heme/onc, continue routine follow up.        2. Abnormal laboratory test result -multiple abnormal labs, some of which is likely associated with Castleman's disease.   Seeing GI for further evaluation and will consider further endocrinology referral.   Continue routine follow up with heme/onc.        3. Vitamin D insufficiency - improved, continue current therapy. Monitor.        4. Alkaline phosphatase elevation - has had further gradual increase with elevated liver and bone isoenzymes.   Follow up with heme/onc. Consider further endocrinology referral.        5. High globulin level - stable since 2014.  Monitor.        6. Elevated blood protein -stable since 2014. Monitor.        7. Leukopenia, unspecified type -stable. Mild. Noted since 2019. Monitor.        8.      9.   Elevated hematocrit - mild, intermittently elevated, since 2020.   Plan for home sleep test.     Hypercalcemia- mild,stable since 2014. PTH normal, vitamin D improved, elevated alkaline phosphatase.   Component may be due to Castleman's. Consider further evaluation and endocrinology referral as needed. Continue to monitor.        10. Essential hypertension - stable, continue current medication. Monitor.  NIFEdipine (ADALAT CC) 60 MG CR tablet      11. Dyslipidemia -stable. Continue current medication.  Monitor.  simvastatin (ZOCOR) 20 MG Tab      12. Prediabetes -stable. Monitor.        Recommend heart healthy/low cholesterol/high fiber/low sugar/low processed food diet and routine exercise.       Follow up sooner as needed.   Return in about 3 months (around 2/22/2024).    This  medical record contains text that has been entered with the assistance of computer voice recognition and dictation software.  Therefore, it may contain unintended errors in text, spelling, punctuation, or grammar.

## 2024-02-17 ENCOUNTER — HOSPITAL ENCOUNTER (OUTPATIENT)
Dept: LAB | Facility: MEDICAL CENTER | Age: 64
End: 2024-02-17
Payer: COMMERCIAL

## 2024-02-17 LAB
HBV CORE AB SERPL QL IA: REACTIVE
HBV CORE IGM SER QL: NORMAL

## 2024-02-17 PROCEDURE — 87350 HEPATITIS BE AG IA: CPT

## 2024-02-17 PROCEDURE — 86705 HEP B CORE ANTIBODY IGM: CPT

## 2024-02-17 PROCEDURE — 87517 HEPATITIS B DNA QUANT: CPT

## 2024-02-17 PROCEDURE — 86707 HEPATITIS BE ANTIBODY: CPT

## 2024-02-17 PROCEDURE — 36415 COLL VENOUS BLD VENIPUNCTURE: CPT

## 2024-02-17 PROCEDURE — 86704 HEP B CORE ANTIBODY TOTAL: CPT

## 2024-02-20 LAB
HBV DNA SERPL NAA+PROBE-ACNC: NOT DETECTED IU/ML
HBV DNA SERPL NAA+PROBE-LOG IU: NOT DETECTED LOG IU/ML
HBV DNA SERPL QL NAA+PROBE: NOT DETECTED
HBV E AB SERPL QL IA: NEGATIVE
HBV E AG SERPL QL IA: NEGATIVE

## 2024-02-21 ENCOUNTER — OFFICE VISIT (OUTPATIENT)
Dept: MEDICAL GROUP | Facility: IMAGING CENTER | Age: 64
End: 2024-02-21
Payer: COMMERCIAL

## 2024-02-21 VITALS
OXYGEN SATURATION: 97 % | SYSTOLIC BLOOD PRESSURE: 118 MMHG | BODY MASS INDEX: 20.28 KG/M2 | HEIGHT: 61 IN | TEMPERATURE: 98.1 F | RESPIRATION RATE: 15 BRPM | HEART RATE: 87 BPM | WEIGHT: 107.4 LBS | DIASTOLIC BLOOD PRESSURE: 60 MMHG

## 2024-02-21 DIAGNOSIS — R74.8 ALKALINE PHOSPHATASE ELEVATION: ICD-10-CM

## 2024-02-21 DIAGNOSIS — R76.8 ANA POSITIVE: ICD-10-CM

## 2024-02-21 DIAGNOSIS — E55.9 VITAMIN D INSUFFICIENCY: ICD-10-CM

## 2024-02-21 DIAGNOSIS — G47.33 MODERATE OBSTRUCTIVE SLEEP APNEA: ICD-10-CM

## 2024-02-21 DIAGNOSIS — R73.09 ELEVATED HEMOGLOBIN A1C: ICD-10-CM

## 2024-02-21 DIAGNOSIS — E78.5 DYSLIPIDEMIA: ICD-10-CM

## 2024-02-21 DIAGNOSIS — R71.8 ELEVATED HEMATOCRIT: ICD-10-CM

## 2024-02-21 DIAGNOSIS — R73.03 PREDIABETES: ICD-10-CM

## 2024-02-21 DIAGNOSIS — R77.9 ELEVATED BLOOD PROTEIN: ICD-10-CM

## 2024-02-21 DIAGNOSIS — I10 ESSENTIAL HYPERTENSION: ICD-10-CM

## 2024-02-21 DIAGNOSIS — R77.1 HIGH GLOBULIN LEVEL: ICD-10-CM

## 2024-02-21 DIAGNOSIS — D47.Z2 CASTLEMAN DISEASE (HCC): ICD-10-CM

## 2024-02-21 PROCEDURE — 3074F SYST BP LT 130 MM HG: CPT | Performed by: FAMILY MEDICINE

## 2024-02-21 PROCEDURE — 99214 OFFICE O/P EST MOD 30 MIN: CPT | Performed by: FAMILY MEDICINE

## 2024-02-21 PROCEDURE — 3078F DIAST BP <80 MM HG: CPT | Performed by: FAMILY MEDICINE

## 2024-02-21 ASSESSMENT — PAIN SCALES - GENERAL: PAINLEVEL: NO PAIN

## 2024-02-21 ASSESSMENT — FIBROSIS 4 INDEX: FIB4 SCORE: 0.93

## 2024-02-21 ASSESSMENT — PATIENT HEALTH QUESTIONNAIRE - PHQ9: CLINICAL INTERPRETATION OF PHQ2 SCORE: 0

## 2024-02-21 NOTE — PROGRESS NOTES
SUBJECTIVE:    Chief Complaint   Patient presents with    Results     Pt states she did see the sleep specialist and completed the sleep study       HPI:     Martha Oliver is a 64 y.o. female with Castleman's disease diagnosed 2015 here for follow up of medical issues.     Castleman's- followed by heme/onc- Dr. KELVIN Dallas. Has follow up next month.   8/2023 PET scan done.   Elevated alkaline phosphatase- Seeing GI for evaluation.   Globulin elevated since 2014. SPEP completed in past, recent repeat labs stable.   Elevated protein levels.     Sleep test completed in January.   Moderate sleep apnea noted.     Hematocrit slightly elevated on prior labs.   Vitamin D low past labs.     Hypertension-stable on nifedipine 60 mg.   Dyslipidemia- simvastatin 20 mg.  Prediabetes-no medications.     SHARON positive, reflexive panel, remaining labs otherwise unremarkable.     Health Maintenance Due   Topic Date Due    Pneumococcal Vaccine: 0-64 Years (1 of 2 - PCV)- plans to complete at future visit.  Never done    COVID-19 Vaccine (4 - 2023-24 season)-recommend at pharmacy 09/01/2023       ROS:  No recent fevers or chills. No nausea or vomiting. No diarrhea. No chest pains or shortness of breath. No lower extremity edema.    Current Outpatient Medications on File Prior to Visit   Medication Sig Dispense Refill    NIFEdipine (ADALAT CC) 60 MG CR tablet Take 1 Tablet by mouth every day. 90 Tablet 3    simvastatin (ZOCOR) 20 MG Tab Take 1 Tablet by mouth every evening. 90 Tablet 3    Ascorbic Acid (VITAMIN C PO) Take  by mouth.      Omega-3 Fatty Acids (FISH OIL) 1000 MG CAPS capsule Take 1,000 mg by mouth 3 times a day, with meals.      vitamin D (CHOLECALCIFEROL) 1000 UNIT TABS Take 2,000 Units by mouth every day.       No current facility-administered medications on file prior to visit.       No Known Allergies    Patient Active Problem List    Diagnosis Date Noted    Prediabetes 02/07/2023    Alkaline phosphatase elevation  "02/07/2023    High globulin level 02/07/2023    Neck nodule 02/07/2023    Essential hypertension 10/21/2015    Ataxia 10/21/2015    Castleman disease (HCC) 02/12/2015    Enlarged lymph nodes 12/02/2014    Dysarthria     Impaired fasting blood sugar 07/17/2014    HTN (hypertension)     Dyslipidemia        Past Medical History:   Diagnosis Date    Ataxia     no neurologic etiology found    Dysarthria     no neurologic etiology found    Dyslipidemia     HTN (hypertension)     Hypertension          OBJECTIVE:   /60 (BP Location: Left arm, Patient Position: Sitting, BP Cuff Size: Adult)   Pulse 87   Temp 36.7 °C (98.1 °F) (Temporal)   Resp 15   Ht 1.549 m (5' 1\")   Wt 48.7 kg (107 lb 6.4 oz)   LMP 08/21/2014   SpO2 97%   BMI 20.29 kg/m²   General: Well-developed well-nourished female, no acute distress  Neck: supple, no lymphadenopathy- cervical or supraclavicular, no thyromegaly  Cardiovascular: regular rate and rhythm, no murmurs, gallops, rubs  Lungs: clear to auscultation bilaterally, no wheezes, crackles, or rhonchi  Abdomen: +bowel sounds, soft, nontender, nondistended, no rebound, no guarding, no hepatosplenomegaly  Extremities: no cyanosis, clubbing, edema  Skin: Warm and dry  Psych: appropriate mood and affect        ASSESSMENT/PLAN:    64 y.o.female       1. Castleman disease (HCC)   Continue routine follow up with hematology/oncology, Dr. Dallas.  Referral to Hematology Oncology      2. Alkaline phosphatase elevation   Seeing GI.   Will request hematology/oncology to further review.  Referral to Hematology Oncology      3. High globulin level   Will request hematology/oncology to further review.  Referral to Hematology Oncology    Comp Metabolic Panel      4. Elevated blood protein   Will request hematology/oncology to further review.  Referral to Hematology Oncology    Comp Metabolic Panel      5. Moderate obstructive sleep apnea   Plan for cpap. Monitor.        6. Elevated hematocrit - noted to " have kathia.   Monitor. CBC WITH DIFFERENTIAL      7. Vitamin D insufficiency   Monitor.  VITAMIN D,25 HYDROXY (DEFICIENCY)      8. Essential hypertension - stable, continue nifedipine 60 mg daily.        9. Dyslipidemia -stable. Continue simvastatin 20 mg daily.   Recommend heart healthy/low cholesterol/high fiber/low sugar/low processed food diet and routine exercise. Monitor.  Lipid Profile    Comp Metabolic Panel      10. Prediabetes - stable, continue current medications.  Monitor.        11. Elevated hemoglobin A1c  HEMOGLOBIN A1C      12. SHARON positive - reflexive panel otherwise unremarkable. Monitor.            Return in about 3 months (around 5/21/2024).    This medical record contains text that has been entered with the assistance of computer voice recognition and dictation software.  Therefore, it may contain unintended errors in text, spelling, punctuation, or grammar.

## 2024-05-18 ENCOUNTER — HOSPITAL ENCOUNTER (OUTPATIENT)
Dept: LAB | Facility: MEDICAL CENTER | Age: 64
End: 2024-05-18
Attending: FAMILY MEDICINE
Payer: COMMERCIAL

## 2024-05-18 DIAGNOSIS — R77.1 HIGH GLOBULIN LEVEL: ICD-10-CM

## 2024-05-18 DIAGNOSIS — E55.9 VITAMIN D INSUFFICIENCY: ICD-10-CM

## 2024-05-18 DIAGNOSIS — R77.9 ELEVATED BLOOD PROTEIN: ICD-10-CM

## 2024-05-18 DIAGNOSIS — R71.8 ELEVATED HEMATOCRIT: ICD-10-CM

## 2024-05-18 DIAGNOSIS — E78.5 DYSLIPIDEMIA: ICD-10-CM

## 2024-05-18 DIAGNOSIS — R73.09 ELEVATED HEMOGLOBIN A1C: ICD-10-CM

## 2024-05-18 LAB
25(OH)D3 SERPL-MCNC: 36 NG/ML (ref 30–100)
ALBUMIN SERPL BCP-MCNC: 4.6 G/DL (ref 3.2–4.9)
ALBUMIN/GLOB SERPL: 1.1 G/DL
ALP SERPL-CCNC: 200 U/L (ref 30–99)
ALT SERPL-CCNC: 22 U/L (ref 2–50)
ANION GAP SERPL CALC-SCNC: 14 MMOL/L (ref 7–16)
AST SERPL-CCNC: 27 U/L (ref 12–45)
BASOPHILS # BLD AUTO: 1.2 % (ref 0–1.8)
BASOPHILS # BLD: 0.06 K/UL (ref 0–0.12)
BILIRUB SERPL-MCNC: 0.3 MG/DL (ref 0.1–1.5)
BUN SERPL-MCNC: 15 MG/DL (ref 8–22)
CALCIUM ALBUM COR SERPL-MCNC: 9.1 MG/DL (ref 8.5–10.5)
CALCIUM SERPL-MCNC: 9.6 MG/DL (ref 8.5–10.5)
CHLORIDE SERPL-SCNC: 105 MMOL/L (ref 96–112)
CHOLEST SERPL-MCNC: 116 MG/DL (ref 100–199)
CO2 SERPL-SCNC: 24 MMOL/L (ref 20–33)
CREAT SERPL-MCNC: 0.51 MG/DL (ref 0.5–1.4)
EOSINOPHIL # BLD AUTO: 0.21 K/UL (ref 0–0.51)
EOSINOPHIL NFR BLD: 4.1 % (ref 0–6.9)
ERYTHROCYTE [DISTWIDTH] IN BLOOD BY AUTOMATED COUNT: 41.3 FL (ref 35.9–50)
EST. AVERAGE GLUCOSE BLD GHB EST-MCNC: 123 MG/DL
FASTING STATUS PATIENT QL REPORTED: NORMAL
GFR SERPLBLD CREATININE-BSD FMLA CKD-EPI: 105 ML/MIN/1.73 M 2
GLOBULIN SER CALC-MCNC: 4.1 G/DL (ref 1.9–3.5)
GLUCOSE SERPL-MCNC: 114 MG/DL (ref 65–99)
HBA1C MFR BLD: 5.9 % (ref 4–5.6)
HCT VFR BLD AUTO: 45.5 % (ref 37–47)
HDLC SERPL-MCNC: 42 MG/DL
HGB BLD-MCNC: 15.5 G/DL (ref 12–16)
IMM GRANULOCYTES # BLD AUTO: 0 K/UL (ref 0–0.11)
IMM GRANULOCYTES NFR BLD AUTO: 0 % (ref 0–0.9)
LDLC SERPL CALC-MCNC: 51 MG/DL
LYMPHOCYTES # BLD AUTO: 2.04 K/UL (ref 1–4.8)
LYMPHOCYTES NFR BLD: 40 % (ref 22–41)
MCH RBC QN AUTO: 30 PG (ref 27–33)
MCHC RBC AUTO-ENTMCNC: 34.1 G/DL (ref 32.2–35.5)
MCV RBC AUTO: 88.2 FL (ref 81.4–97.8)
MONOCYTES # BLD AUTO: 0.54 K/UL (ref 0–0.85)
MONOCYTES NFR BLD AUTO: 10.6 % (ref 0–13.4)
NEUTROPHILS # BLD AUTO: 2.25 K/UL (ref 1.82–7.42)
NEUTROPHILS NFR BLD: 44.1 % (ref 44–72)
NRBC # BLD AUTO: 0 K/UL
NRBC BLD-RTO: 0 /100 WBC (ref 0–0.2)
PLATELET # BLD AUTO: 328 K/UL (ref 164–446)
PMV BLD AUTO: 9.9 FL (ref 9–12.9)
POTASSIUM SERPL-SCNC: 4 MMOL/L (ref 3.6–5.5)
PROT SERPL-MCNC: 8.7 G/DL (ref 6–8.2)
RBC # BLD AUTO: 5.16 M/UL (ref 4.2–5.4)
SODIUM SERPL-SCNC: 143 MMOL/L (ref 135–145)
TRIGL SERPL-MCNC: 115 MG/DL (ref 0–149)
WBC # BLD AUTO: 5.1 K/UL (ref 4.8–10.8)

## 2024-05-24 ENCOUNTER — OFFICE VISIT (OUTPATIENT)
Dept: MEDICAL GROUP | Facility: IMAGING CENTER | Age: 64
End: 2024-05-24
Payer: COMMERCIAL

## 2024-05-24 VITALS
RESPIRATION RATE: 14 BRPM | WEIGHT: 108.4 LBS | HEART RATE: 86 BPM | SYSTOLIC BLOOD PRESSURE: 114 MMHG | TEMPERATURE: 97.7 F | DIASTOLIC BLOOD PRESSURE: 60 MMHG | OXYGEN SATURATION: 97 % | HEIGHT: 61 IN | BODY MASS INDEX: 20.47 KG/M2

## 2024-05-24 DIAGNOSIS — D47.Z2 CASTLEMAN DISEASE (HCC): ICD-10-CM

## 2024-05-24 DIAGNOSIS — Z23 NEED FOR PNEUMOCOCCAL VACCINATION: ICD-10-CM

## 2024-05-24 DIAGNOSIS — Z78.0 POSTMENOPAUSAL: ICD-10-CM

## 2024-05-24 DIAGNOSIS — R74.8 ALKALINE PHOSPHATASE ELEVATION: ICD-10-CM

## 2024-05-24 DIAGNOSIS — R77.1 HIGH GLOBULIN LEVEL: ICD-10-CM

## 2024-05-24 DIAGNOSIS — G47.33 MODERATE OBSTRUCTIVE SLEEP APNEA: ICD-10-CM

## 2024-05-24 DIAGNOSIS — R71.8 ELEVATED HEMATOCRIT: ICD-10-CM

## 2024-05-24 PROCEDURE — 99214 OFFICE O/P EST MOD 30 MIN: CPT | Mod: 25 | Performed by: FAMILY MEDICINE

## 2024-05-24 PROCEDURE — 3078F DIAST BP <80 MM HG: CPT | Performed by: FAMILY MEDICINE

## 2024-05-24 PROCEDURE — 3074F SYST BP LT 130 MM HG: CPT | Performed by: FAMILY MEDICINE

## 2024-05-24 PROCEDURE — 90677 PCV20 VACCINE IM: CPT | Performed by: FAMILY MEDICINE

## 2024-05-24 PROCEDURE — 90471 IMMUNIZATION ADMIN: CPT | Performed by: FAMILY MEDICINE

## 2024-05-24 ASSESSMENT — PAIN SCALES - GENERAL: PAINLEVEL: NO PAIN

## 2024-05-24 ASSESSMENT — FIBROSIS 4 INDEX: FIB4 SCORE: 1.09

## 2024-05-24 NOTE — PROGRESS NOTES
"  SUBJECTIVE:    Chief Complaint   Patient presents with    Lab Results    Immunizations     Pneumonia vaccine wanted       HPI:     Martha Oliver is a 64 y.o. female     Michael dr. Dallas  Will have another pet scan.   End of aug    Lencho- h/h improving.   April started on cpapc.   Uses distilled water with it.   Dry mouth.   Sleeps better.   More rested.       Alk ph  Pth normal     dexa  Globulin       ROS:  No recent fevers or chills. No nausea or vomiting. No diarrhea. No chest pains or shortness of breath. No lower extremity edema.    Current Outpatient Medications on File Prior to Visit   Medication Sig Dispense Refill    NIFEdipine (ADALAT CC) 60 MG CR tablet Take 1 Tablet by mouth every day. 90 Tablet 3    simvastatin (ZOCOR) 20 MG Tab Take 1 Tablet by mouth every evening. 90 Tablet 3    Ascorbic Acid (VITAMIN C PO) Take  by mouth.      Omega-3 Fatty Acids (FISH OIL) 1000 MG CAPS capsule Take 1,000 mg by mouth 3 times a day, with meals.      vitamin D (CHOLECALCIFEROL) 1000 UNIT TABS Take 2,000 Units by mouth every day.       No current facility-administered medications on file prior to visit.       No Known Allergies    Patient Active Problem List    Diagnosis Date Noted    Prediabetes 02/07/2023    Alkaline phosphatase elevation 02/07/2023    High globulin level 02/07/2023    Neck nodule 02/07/2023    Essential hypertension 10/21/2015    Ataxia 10/21/2015    Castleman disease (HCC) 02/12/2015    Enlarged lymph nodes 12/02/2014    Dysarthria     Impaired fasting blood sugar 07/17/2014    HTN (hypertension)     Dyslipidemia        Past Medical History:   Diagnosis Date    Ataxia     no neurologic etiology found    Dysarthria     no neurologic etiology found    Dyslipidemia     HTN (hypertension)     Hypertension          OBJECTIVE:   /60 (BP Location: Left arm, Patient Position: Sitting, BP Cuff Size: Adult)   Pulse 86   Temp 36.5 °C (97.7 °F) (Temporal)   Resp 14   Ht 1.549 m (5' 1\")   Wt 49.2 kg " (108 lb 6.4 oz)   LMP 08/21/2014   SpO2 97%   BMI 20.48 kg/m²   General: Well-developed well-nourished female, no acute distress  Neck: supple, no lymphadenopathy- cervical or supraclavicular, no thyromegaly  Cardiovascular: regular rate and rhythm, no murmurs, gallops, rubs  Lungs: clear to auscultation bilaterally, no wheezes, crackles, or rhonchi  Abdomen: +bowel sounds, soft, nontender, nondistended, no rebound, no guarding, no hepatosplenomegaly  Extremities: no cyanosis, clubbing, edema  Skin: Warm and dry  Psych: appropriate mood and affect     Latest Reference Range & Units 05/18/24 07:54   WBC 4.8 - 10.8 K/uL 5.1   RBC 4.20 - 5.40 M/uL 5.16   Hemoglobin 12.0 - 16.0 g/dL 15.5   Hematocrit 37.0 - 47.0 % 45.5   MCV 81.4 - 97.8 fL 88.2   MCH 27.0 - 33.0 pg 30.0   MCHC 32.2 - 35.5 g/dL 34.1   RDW 35.9 - 50.0 fL 41.3   Platelet Count 164 - 446 K/uL 328   MPV 9.0 - 12.9 fL 9.9   Neutrophils-Polys 44.00 - 72.00 % 44.10   Neutrophils (Absolute) 1.82 - 7.42 K/uL 2.25   Lymphocytes 22.00 - 41.00 % 40.00   Lymphs (Absolute) 1.00 - 4.80 K/uL 2.04   Monocytes 0.00 - 13.40 % 10.60   Monos (Absolute) 0.00 - 0.85 K/uL 0.54   Eosinophils 0.00 - 6.90 % 4.10   Eos (Absolute) 0.00 - 0.51 K/uL 0.21   Basophils 0.00 - 1.80 % 1.20   Baso (Absolute) 0.00 - 0.12 K/uL 0.06   Immature Granulocytes 0.00 - 0.90 % 0.00   Immature Granulocytes (abs) 0.00 - 0.11 K/uL 0.00   Nucleated RBC 0.00 - 0.20 /100 WBC 0.00   NRBC (Absolute) K/uL 0.00   Sodium 135 - 145 mmol/L 143   Potassium 3.6 - 5.5 mmol/L 4.0   Chloride 96 - 112 mmol/L 105   Co2 20 - 33 mmol/L 24   Anion Gap 7.0 - 16.0  14.0   Glucose 65 - 99 mg/dL 114 (H)   Bun 8 - 22 mg/dL 15   Creatinine 0.50 - 1.40 mg/dL 0.51   GFR (CKD-EPI) >60 mL/min/1.73 m 2 105   Calcium 8.5 - 10.5 mg/dL 9.6   Correct Calcium 8.5 - 10.5 mg/dL 9.1   AST(SGOT) 12 - 45 U/L 27   ALT(SGPT) 2 - 50 U/L 22   Alkaline Phosphatase 30 - 99 U/L 200 (H)   Total Bilirubin 0.1 - 1.5 mg/dL 0.3   Albumin 3.2 - 4.9 g/dL  4.6   Total Protein 6.0 - 8.2 g/dL 8.7 (H)   Globulin 1.9 - 3.5 g/dL 4.1 (H)   A-G Ratio g/dL 1.1   Glycohemoglobin 4.0 - 5.6 % 5.9 (H)   Estim. Avg Glu mg/dL 123   Fasting Status  Fasting   Cholesterol,Tot 100 - 199 mg/dL 116   Triglycerides 0 - 149 mg/dL 115   HDL >=40 mg/dL 42   LDL <100 mg/dL 51   25-Hydroxy   Vitamin D 25 30 - 100 ng/mL 36   (H): Data is abnormally high        ASSESSMENT/PLAN:    64 y.o.female       1. Need for vaccination  Pneumococcal Conjugate Vaccine 20-Valent (6 wks+)      2. Castleman disease (HCC)        3. Alkaline phosphatase elevation        4. High globulin level        5. Moderate obstructive sleep apnea        6. Need for pneumococcal vaccination        7. Postmenopausal  DS-BONE DENSITY STUDY (DEXA)              No follow-ups on file.    This medical record contains text that has been entered with the assistance of computer voice recognition and dictation software.  Therefore, it may contain unintended errors in text, spelling, punctuation, or grammar.

## 2024-07-05 ENCOUNTER — HOSPITAL ENCOUNTER (OUTPATIENT)
Dept: RADIOLOGY | Facility: MEDICAL CENTER | Age: 64
End: 2024-07-05
Attending: FAMILY MEDICINE
Payer: COMMERCIAL

## 2024-07-05 DIAGNOSIS — Z78.0 POSTMENOPAUSAL: ICD-10-CM

## 2024-07-05 PROCEDURE — 77080 DXA BONE DENSITY AXIAL: CPT

## 2024-08-02 ENCOUNTER — OFFICE VISIT (OUTPATIENT)
Dept: URGENT CARE | Facility: PHYSICIAN GROUP | Age: 64
End: 2024-08-02
Payer: COMMERCIAL

## 2024-08-02 VITALS
WEIGHT: 109.4 LBS | SYSTOLIC BLOOD PRESSURE: 100 MMHG | HEART RATE: 95 BPM | OXYGEN SATURATION: 95 % | TEMPERATURE: 98.8 F | RESPIRATION RATE: 20 BRPM | DIASTOLIC BLOOD PRESSURE: 66 MMHG | HEIGHT: 61 IN | BODY MASS INDEX: 20.65 KG/M2

## 2024-08-02 DIAGNOSIS — J06.9 UPPER RESPIRATORY INFECTION, ACUTE: ICD-10-CM

## 2024-08-02 DIAGNOSIS — R05.1 ACUTE COUGH: ICD-10-CM

## 2024-08-02 PROCEDURE — 3078F DIAST BP <80 MM HG: CPT | Performed by: NURSE PRACTITIONER

## 2024-08-02 PROCEDURE — 99214 OFFICE O/P EST MOD 30 MIN: CPT | Performed by: NURSE PRACTITIONER

## 2024-08-02 PROCEDURE — 3074F SYST BP LT 130 MM HG: CPT | Performed by: NURSE PRACTITIONER

## 2024-08-02 RX ORDER — DEXTROMETHORPHAN HYDROBROMIDE AND PROMETHAZINE HYDROCHLORIDE 15; 6.25 MG/5ML; MG/5ML
5 SYRUP ORAL NIGHTLY PRN
Qty: 120 ML | Refills: 0 | Status: SHIPPED | OUTPATIENT
Start: 2024-08-02

## 2024-08-02 RX ORDER — BENZONATATE 100 MG/1
100 CAPSULE ORAL 3 TIMES DAILY PRN
Qty: 60 CAPSULE | Refills: 0 | Status: SHIPPED | OUTPATIENT
Start: 2024-08-02

## 2024-08-02 ASSESSMENT — FIBROSIS 4 INDEX: FIB4 SCORE: 1.09

## 2024-08-02 NOTE — PROGRESS NOTES
Martha Oliver is a 64 y.o. female who presents for Cough (Cough X 11 days. )      HPI  This is a new problem. Martha Oliver is a 64 y.o. patient who presents to urgent care with c/o: coughing for 11 days. All day and worse at night when she lays down.  The first few days she had a runny nose and some fatigue.  All of her symptoms have resolved except for her cough.  She never tested for COVID.  She has a known exposures.  Tx tried: Coricidin HP, ricola cough drops   No fever, sob, dizziness, chest pain, wheezing, nasal drainage, congestion, peripheral edema, orthopnea.     ROS See HPI    Allergies:     No Known Allergies    PMSFS Hx:  Past Medical History:   Diagnosis Date    Ataxia     no neurologic etiology found    Dysarthria     no neurologic etiology found    Dyslipidemia     HTN (hypertension)     Hypertension      Past Surgical History:   Procedure Laterality Date    LYMPH NODE EXCISION  12/2/2014    Performed by Ирина Arcos M.D. at [facility]    COLONOSCOPY  9/14    internal and external hemorrhoids    TUBAL COAGULATION LAPAROSCOPIC BILATERAL       Family History   Problem Relation Age of Onset    Hypertension Mother     Hyperlipidemia Mother     Hypertension Sister     Hyperlipidemia Sister     Hypertension Sister     Hyperlipidemia Sister      Social History     Tobacco Use    Smoking status: Never    Smokeless tobacco: Never   Substance Use Topics    Alcohol use: Not Currently     Comment: occasional       Problems:   Patient Active Problem List   Diagnosis    HTN (hypertension)    Dyslipidemia    Impaired fasting blood sugar    Dysarthria    Enlarged lymph nodes    Castleman disease (HCC)    Essential hypertension    Ataxia    Prediabetes    Alkaline phosphatase elevation    High globulin level    Neck nodule       Medications:   Current Outpatient Medications on File Prior to Visit   Medication Sig Dispense Refill    NIFEdipine (ADALAT CC) 60 MG CR tablet Take 1 Tablet by mouth  "every day. 90 Tablet 3    simvastatin (ZOCOR) 20 MG Tab Take 1 Tablet by mouth every evening. 90 Tablet 3    Ascorbic Acid (VITAMIN C PO) Take  by mouth.      Omega-3 Fatty Acids (FISH OIL) 1000 MG CAPS capsule Take 1,000 mg by mouth 3 times a day, with meals.      vitamin D (CHOLECALCIFEROL) 1000 UNIT TABS Take 2,000 Units by mouth every day.       No current facility-administered medications on file prior to visit.        Objective:     /66 (BP Location: Right arm, Patient Position: Sitting, BP Cuff Size: Adult)   Pulse 95   Temp 37.1 °C (98.8 °F) (Temporal)   Resp 20   Ht 1.549 m (5' 1\")   Wt 49.6 kg (109 lb 6.4 oz)   LMP 08/21/2014   SpO2 95%   BMI 20.67 kg/m²     Physical Exam  Vitals and nursing note reviewed.   Constitutional:       General: She is not in acute distress.     Appearance: Normal appearance. She is well-developed. She is not ill-appearing or toxic-appearing.   HENT:      Head: Normocephalic.      Right Ear: Hearing normal. No middle ear effusion. Tympanic membrane is injected. Tympanic membrane is not erythematous.      Left Ear: Hearing normal.  No middle ear effusion. Tympanic membrane is injected. Tympanic membrane is not erythematous.      Nose: No mucosal edema or rhinorrhea.      Right Sinus: No maxillary sinus tenderness or frontal sinus tenderness.      Left Sinus: No maxillary sinus tenderness or frontal sinus tenderness.      Mouth/Throat:      Pharynx: Uvula midline. No posterior oropharyngeal erythema.      Tonsils: No tonsillar abscesses.   Neck:      Trachea: Trachea normal.   Cardiovascular:      Rate and Rhythm: Normal rate and regular rhythm.      Chest Wall: PMI is not displaced.      Pulses: Normal pulses.      Heart sounds: Normal heart sounds.   Pulmonary:      Effort: Pulmonary effort is normal. No respiratory distress.      Breath sounds: Normal breath sounds. No decreased breath sounds, wheezing, rhonchi or rales.      Comments: Dry cough " present  Musculoskeletal:         General: Normal range of motion.      Cervical back: Full passive range of motion without pain, normal range of motion and neck supple.   Lymphadenopathy:      Cervical: No cervical adenopathy.      Upper Body:      Right upper body: No supraclavicular adenopathy.      Left upper body: No supraclavicular adenopathy.   Skin:     General: Skin is warm and dry.      Capillary Refill: Capillary refill takes less than 2 seconds.   Neurological:      Mental Status: She is alert and oriented to person, place, and time.      Gait: Gait normal.   Psychiatric:         Mood and Affect: Mood normal.         Speech: Speech normal.         Behavior: Behavior normal. Behavior is cooperative.         Assessment /Associated Orders:      1. Acute cough  benzonatate (TESSALON) 100 MG Cap    promethazine-dextromethorphan (PROMETHAZINE-DM) 6.25-15 MG/5ML syrup      2. Upper respiratory infection, acute              Medical Decision Making:    Martha is a very pleasant 64 y.o. female who is clinically stable at today's acute urgent care visit.  No acute distress noted.  VSS. Appropriate for outpatient care at this time.   Acute problem today with uncertain prognosis.  Patient asked about COVID testing.  She was educated that with 11 days of symptoms it is not necessary to test for COVID at this time.  Testing would not change therapy today and it is possible that she got a false negative result.  Educated in proper administration of  prescription medication(s) ordered today including safety, possible SE, risks, benefits, rationale and alternatives to therapy.   Educated not to drive, drink alcohol, operate machinery, or do anything that requires mental alertness while taking  promethazine-DM RX medication that has sedation/ drowsiness as a side effect.  Do not combine this medication with other cough and cold medications.  Allow an 6-hour wear off time before participating in any of these  activities.  Discussed that this illness appears to be viral in nature. I did not see any evidence of a bacterial process on exam today.   Keep well hydrated  Increase rest    Discussed Dx, management options (risks,benefits, and alternatives to planned treatment), natural progression and supportive care.  Expressed understanding and the treatment plan was agreed upon.   Questions were encouraged and answered   Return to urgent care prn if new or worsening sx or if there is no improvement in condition prn.    Educated in Red flags and indications to immediately call 911 or present to the Emergency Department.           Please note that this dictation was created using voice recognition software. I have worked with consultants from the vendor as well as technical experts from [facility] to optimize the interface. I have made every reasonable attempt to correct obvious errors, but I expect that there are errors of grammar and possibly content that I did not discover before finalizing the note.  This note was electronically signed by provider

## 2024-08-02 NOTE — LETTER
August 2, 2024       Patient: Martha Oliver   YOB: 1960   Date of Visit: 8/2/2024         To Whom It May Concern:     Martha Oliver was seen in urgent care today. She may return to full duty, no restrictions on 08/03/24. Please excuse her work absence.               Sincerely,          SHYANNE GarciaPChristineN.  Electronically Signed

## 2024-09-03 ENCOUNTER — HOSPITAL ENCOUNTER (OUTPATIENT)
Dept: RADIOLOGY | Facility: MEDICAL CENTER | Age: 64
End: 2024-09-03
Attending: INTERNAL MEDICINE
Payer: COMMERCIAL

## 2024-09-03 DIAGNOSIS — D47.Z2 ANGIOLYMPHOID HYPERPLASIA (HCC): ICD-10-CM

## 2024-09-03 LAB — GLUCOSE BLD-MCNC: 103 MG/DL (ref 65–99)

## 2024-09-03 PROCEDURE — A9552 F18 FDG: HCPCS

## 2024-09-06 ENCOUNTER — APPOINTMENT (OUTPATIENT)
Dept: MEDICAL GROUP | Facility: IMAGING CENTER | Age: 64
End: 2024-09-06
Payer: COMMERCIAL

## 2024-09-06 VITALS
HEIGHT: 61 IN | BODY MASS INDEX: 20.05 KG/M2 | DIASTOLIC BLOOD PRESSURE: 60 MMHG | TEMPERATURE: 98.3 F | HEART RATE: 89 BPM | SYSTOLIC BLOOD PRESSURE: 100 MMHG | OXYGEN SATURATION: 97 % | WEIGHT: 106.2 LBS

## 2024-09-06 DIAGNOSIS — R77.1 HIGH GLOBULIN LEVEL: ICD-10-CM

## 2024-09-06 DIAGNOSIS — R05.9 COUGH IN ADULT: ICD-10-CM

## 2024-09-06 DIAGNOSIS — E78.5 DYSLIPIDEMIA: ICD-10-CM

## 2024-09-06 DIAGNOSIS — Z23 NEED FOR DIPHTHERIA-TETANUS-PERTUSSIS (TDAP) VACCINE: ICD-10-CM

## 2024-09-06 DIAGNOSIS — G47.33 MODERATE OBSTRUCTIVE SLEEP APNEA: ICD-10-CM

## 2024-09-06 DIAGNOSIS — R74.8 ALKALINE PHOSPHATASE ELEVATION: ICD-10-CM

## 2024-09-06 DIAGNOSIS — D47.Z2 CASTLEMAN DISEASE (HCC): ICD-10-CM

## 2024-09-06 DIAGNOSIS — Z12.11 SCREEN FOR COLON CANCER: ICD-10-CM

## 2024-09-06 DIAGNOSIS — M81.0 AGE-RELATED OSTEOPOROSIS WITHOUT CURRENT PATHOLOGICAL FRACTURE: ICD-10-CM

## 2024-09-06 DIAGNOSIS — I10 ESSENTIAL HYPERTENSION: ICD-10-CM

## 2024-09-06 PROCEDURE — 3074F SYST BP LT 130 MM HG: CPT | Performed by: FAMILY MEDICINE

## 2024-09-06 PROCEDURE — 99214 OFFICE O/P EST MOD 30 MIN: CPT | Performed by: FAMILY MEDICINE

## 2024-09-06 PROCEDURE — 3078F DIAST BP <80 MM HG: CPT | Performed by: FAMILY MEDICINE

## 2024-09-06 ASSESSMENT — FIBROSIS 4 INDEX: FIB4 SCORE: 1.09

## 2024-09-24 ENCOUNTER — IMMUNIZATION (OUTPATIENT)
Dept: OCCUPATIONAL MEDICINE | Facility: CLINIC | Age: 64
End: 2024-09-24

## 2024-09-24 DIAGNOSIS — Z23 NEED FOR VACCINATION: Primary | ICD-10-CM

## 2024-10-01 ENCOUNTER — HOSPITAL ENCOUNTER (OUTPATIENT)
Dept: RADIOLOGY | Facility: MEDICAL CENTER | Age: 64
End: 2024-10-01
Attending: FAMILY MEDICINE
Payer: COMMERCIAL

## 2024-10-01 DIAGNOSIS — Z12.31 ENCOUNTER FOR SCREENING MAMMOGRAM FOR MALIGNANT NEOPLASM OF BREAST: ICD-10-CM

## 2024-10-01 PROCEDURE — 77067 SCR MAMMO BI INCL CAD: CPT

## 2024-10-14 RX ORDER — ALENDRONATE SODIUM 70 MG/1
70 TABLET ORAL
Qty: 12 TABLET | Refills: 1 | Status: SHIPPED | OUTPATIENT
Start: 2024-10-14

## 2024-10-14 RX ORDER — AZELASTINE 1 MG/ML
1 SPRAY, METERED NASAL 2 TIMES DAILY
Qty: 30 ML | Refills: 1 | Status: SHIPPED | OUTPATIENT
Start: 2024-10-14

## 2024-10-14 RX ORDER — SIMVASTATIN 20 MG
20 TABLET ORAL EVERY EVENING
Qty: 90 TABLET | Refills: 3 | Status: SHIPPED | OUTPATIENT
Start: 2024-10-14

## 2024-11-26 ENCOUNTER — OFFICE VISIT (OUTPATIENT)
Dept: MEDICAL GROUP | Facility: IMAGING CENTER | Age: 64
End: 2024-11-26
Payer: COMMERCIAL

## 2024-11-26 VITALS
BODY MASS INDEX: 20.58 KG/M2 | WEIGHT: 109 LBS | OXYGEN SATURATION: 98 % | DIASTOLIC BLOOD PRESSURE: 62 MMHG | TEMPERATURE: 98 F | HEIGHT: 61 IN | RESPIRATION RATE: 16 BRPM | HEART RATE: 97 BPM | SYSTOLIC BLOOD PRESSURE: 106 MMHG

## 2024-11-26 DIAGNOSIS — D47.Z2 CASTLEMAN DISEASE (HCC): ICD-10-CM

## 2024-11-26 DIAGNOSIS — Z02.89 ENCOUNTER FOR COMPLETION OF FORM WITH PATIENT: ICD-10-CM

## 2024-11-26 ASSESSMENT — FIBROSIS 4 INDEX: FIB4 SCORE: 1.09

## 2024-11-26 NOTE — PROGRESS NOTES
Subjective:     CC:   Chief Complaint   Patient presents with    Paperwork     FMLA        HPI:     Verbal consent was acquired by the patient to use [employer] ambient listening note generation during this visit Yes      nigel Joseph, 64 y.o., female,  presents today to discuss:     History of Present Illness  The patient presents for the completion of Family and Medical Leave Act (FMLA) paperwork.    She is currently under the care of Dr. Peters and requires the completion of her FMLA paperwork. The patient works an 8-hour shift and is seeking FMLA coverage for a duration of one year.    Castleman Disease  The patient has been diagnosed with Castleman disease, a rare lymphoproliferative disorder, and is under the treatment of Dr. Dallas, an oncologist. As part of her management, she undergoes annual positron emission tomography (PET) scans. Her next appointment with Dr. Dallas is scheduled for April 18, 2024. She denies experiencing any symptoms associated with Castleman disease, such as lymphadenopathy, pyrexia, chills, or fatigue.  - Onset: Not specified.  - Character: Denies symptoms such as lymphadenopathy, pyrexia, chills, or fatigue.  - Timing: Undergoes annual PET scans; next appointment on April 18, 2024.     She has an upcoming colonoscopy scheduled for January 2024, as referred by Dr. Peters. Additionally, she is due for a Papanicolaou (Pap) smear. The patient has follow-up visits with Dr. Peters approximately every two months and with Dr. Dallas biannually. She needs to request time off for her medical appointments.     SOCIAL HISTORY  - Works at [employer]  [employer]       ROS:  See HPI    Medications, allergies, past medical history, family history, surgical history, and social history documented in chart and reviewed by me.       Objective:   Exam:  /62 (BP Location: Left arm, Patient Position: Sitting, BP Cuff Size: Adult)   Pulse 97   Temp 36.7 °C (98 °F) (Temporal)   Resp  "16   Ht 1.549 m (5' 1\")   Wt 49.4 kg (109 lb)   LMP 08/21/2014   SpO2 98%   BMI 20.60 kg/m²      General: In no acute distress. Normal appearance.   Head:   Atraumatic, normocephalic.   Neck: Supple without lymphadenopathy.   Pulmonary: No respiratory distress   musculoskeletal:  Normal ROM. No edema.    Skin: No visible rashes or lesions.  Neurological: Alert and oriented to person, place, and time. Gait normal.   Psychiatric: Normal mood and affect. Calm and friendly behavior. Speech clear. Normal judgement and insight.         Assessment & Plan:       Assessment & Plan    1. Encounter for completion of form with patient  FMLA completed today as requested by pt. See scanned copies.     2. Castleman disease (HCC)  Chronic, stable. Managed by oncology-Dr. Dallas at the [facility]. She undergoes PET scans annually and has regular appointments with Dr. Dallas twice a year. She reports no symptoms such as enlarged lymph nodes, fever, chills, or fatigue. She will follow up with oncology and with PCP as planned.              Return for next available with PCP for med check.     Please note that this dictation was created using voice recognition software. I have made every reasonable attempt to correct obvious errors, but I expect that there are errors of grammar and possibly content that I did not discover before finalizing the note.    Eleanor Mast PA-C  [facility]  "

## 2024-11-26 NOTE — PATIENT INSTRUCTIONS
Thank you for choosing [facility]. It was a pleasure meeting you today.     Take care!  Eleanor [facility]

## 2025-01-17 ENCOUNTER — APPOINTMENT (OUTPATIENT)
Dept: MEDICAL GROUP | Facility: IMAGING CENTER | Age: 65
End: 2025-01-17
Payer: COMMERCIAL

## 2025-01-17 ENCOUNTER — HOSPITAL ENCOUNTER (OUTPATIENT)
Facility: MEDICAL CENTER | Age: 65
End: 2025-01-17
Attending: FAMILY MEDICINE
Payer: COMMERCIAL

## 2025-01-17 VITALS
HEIGHT: 61 IN | TEMPERATURE: 97.6 F | WEIGHT: 108.2 LBS | OXYGEN SATURATION: 99 % | SYSTOLIC BLOOD PRESSURE: 116 MMHG | DIASTOLIC BLOOD PRESSURE: 62 MMHG | RESPIRATION RATE: 16 BRPM | BODY MASS INDEX: 20.43 KG/M2 | HEART RATE: 62 BPM

## 2025-01-17 DIAGNOSIS — R92.333 HETEROGENEOUSLY DENSE TISSUE OF BOTH BREASTS ON MAMMOGRAPHY: ICD-10-CM

## 2025-01-17 DIAGNOSIS — M81.0 AGE-RELATED OSTEOPOROSIS WITHOUT CURRENT PATHOLOGICAL FRACTURE: ICD-10-CM

## 2025-01-17 DIAGNOSIS — Z12.4 SCREENING FOR CERVICAL CANCER: ICD-10-CM

## 2025-01-17 DIAGNOSIS — D47.Z2 CASTLEMAN DISEASE (HCC): ICD-10-CM

## 2025-01-17 DIAGNOSIS — R22.1 NECK NODULE: ICD-10-CM

## 2025-01-17 DIAGNOSIS — Z01.419 WELL WOMAN EXAM WITH ROUTINE GYNECOLOGICAL EXAM: ICD-10-CM

## 2025-01-17 PROCEDURE — 3078F DIAST BP <80 MM HG: CPT | Performed by: FAMILY MEDICINE

## 2025-01-17 PROCEDURE — 99396 PREV VISIT EST AGE 40-64: CPT | Performed by: FAMILY MEDICINE

## 2025-01-17 PROCEDURE — 88142 CYTOPATH C/V THIN LAYER: CPT

## 2025-01-17 PROCEDURE — 3074F SYST BP LT 130 MM HG: CPT | Performed by: FAMILY MEDICINE

## 2025-01-17 PROCEDURE — 87624 HPV HI-RISK TYP POOLED RSLT: CPT

## 2025-01-17 RX ORDER — ALENDRONATE SODIUM 70 MG/1
70 TABLET ORAL
Qty: 12 TABLET | Refills: 1 | Status: SHIPPED | OUTPATIENT
Start: 2025-01-17

## 2025-01-17 ASSESSMENT — FIBROSIS 4 INDEX: FIB4 SCORE: 1.11

## 2025-01-17 NOTE — PROGRESS NOTES
Chief Complaint   Patient presents with    Gynecologic Exam     pap    Medication Refill         <SUBJECTIVE>  Martha Oliver is a 65 y.o. female for routine annual evaluation.  Patient's last menstrual period was 08/21/2014.  Mother in law passed away in [place].   Castleman's disease- sees heme/onc routinely. Prior PET scan completed. Dr. Dallas.   Left neck nodule- incidental on exam. No neck issues, no pain.     Health Maintenance:  Last pap:  History of abnormal pap:  Diet: healthy  Calcium: supplement daily  Exercise: regularly   Immunizations: reviewed recommendations for Tdap  Mammogram:10/2024  Colonoscopy:  1/2025, prior referral placed  Dexa Scan:2024  3 kids.         Health Maintenance Due   Topic Date Due    IMM DTaP/Tdap/Td Vaccine (2 - Td or Tdap) 07/17/2024    Colorectal Cancer Screening  09/25/2024       Immunization History   Administered Date(s) Administered    COVID-19, mRNA, LNP-S, PF, rickey-sucrose, 30 mcg/0.3 mL 01/19/2025    Hepatitis B Vaccine Adolescent/Pediatric 07/28/1999, 08/28/1999, 01/28/2000    INFLUENZA TIV (IM) 04/22/2014    Influenza Seasonal Injectable - Historical Data 04/22/2014    Influenza Vaccine Quad Inj (Pf) 10/23/2014, 10/05/2015, 10/03/2016, 10/03/2017, 09/24/2018, 10/06/2019, 09/24/2020, 09/21/2021, 09/27/2022, 09/26/2023    Influenza Vaccine Quad Inj (Preserved) 10/05/2015, 10/03/2016    Influenza split virus trivalent (PF) 09/27/2024    PFIZER PURPLE CAP SARS-COV-2 VACCINATION (12+) 02/06/2021, 02/27/2021, 09/29/2021    Pneumococcal Conjugate Vaccine (PCV20) 05/24/2024    RSV AREXVY VACCINE 01/19/2025    Tdap Vaccine 07/17/2014    Tuberculin Skin Test 04/22/2014, 04/30/2014    Zoster Vaccine Recombinant (RZV) (SHINGRIX) 05/25/2021, 12/06/2021         Current Outpatient Medications   Medication Sig Dispense Refill    NIFEdipine (ADALAT CC) 60 MG CR tablet Take 1 Tablet by mouth every day. 90 Tablet 3    simvastatin (ZOCOR) 20 MG Tab Take 1 Tablet by mouth every evening.  90 Tablet 3    alendronate (FOSAMAX) 70 MG Tab Take 1 Tablet by mouth every 7 days. 12 Tablet 1    Ascorbic Acid (VITAMIN C PO) Take  by mouth.      Omega-3 Fatty Acids (FISH OIL) 1000 MG CAPS capsule Take 1,000 mg by mouth 3 times a day, with meals.      vitamin D (CHOLECALCIFEROL) 1000 UNIT TABS Take 2,000 Units by mouth every day.      azelastine (ASTELIN) 137 MCG/SPRAY nasal spray Administer 1 Spray into affected nostril(S) 2 times a day. (Patient not taking: Reported on 2025) 30 mL 1    benzonatate (TESSALON) 100 MG Cap Take 1 Capsule by mouth 3 times a day as needed for Cough. (Patient not taking: Reported on 2025) 60 Capsule 0    promethazine-dextromethorphan (PROMETHAZINE-DM) 6.25-15 MG/5ML syrup Take 5 mL by mouth at bedtime as needed for Cough (MR x 1 in 4 hours). (Patient not taking: Reported on 2025) 120 mL 0     No current facility-administered medications for this visit.     Drug allergies: Patient has no known allergies.    OB History    Para Term  AB Living   3 3 3 0 0 0   SAB IAB Ectopic Molar Multiple Live Births   0 0 0 0 0 0       Past Medical History:   Diagnosis Date    Ataxia     no neurologic etiology found    Dysarthria     no neurologic etiology found    Dyslipidemia     HTN (hypertension)     Hypertension        Past Surgical History:   Procedure Laterality Date    LYMPH NODE EXCISION  2014    Performed by Ирина Arcos M.D. at SURGERY SAME DAY [facility] ORS    COLONOSCOPY      internal and external hemorrhoids    TUBAL COAGULATION LAPAROSCOPIC BILATERAL         Family History   Problem Relation Age of Onset    Hypertension Mother     Hyperlipidemia Mother     Hypertension Sister     Hyperlipidemia Sister     Hypertension Sister     Hyperlipidemia Sister        Social History     Socioeconomic History    Marital status:      Spouse name: Not on file    Number of children: 3    Years of education: Not on file    Highest education level: Not on  "file   Occupational History    Occupation: sample handling     Employer: [employer]   Tobacco Use    Smoking status: Never    Smokeless tobacco: Never   Vaping Use    Vaping status: Never Used   Substance and Sexual Activity    Alcohol use: Not Currently     Comment: occasional    Drug use: No    Sexual activity: Yes     Partners: Male     Birth control/protection: Surgical   Other Topics Concern    Not on file   Social History Narrative    Not on file     Social Drivers of Health     Financial Resource Strain: Not on file   Food Insecurity: Not on file   Transportation Needs: Not on file   Physical Activity: Not on file   Stress: Not on file   Social Connections: Not on file   Intimate Partner Violence: Not on file   Housing Stability: Not on file       ROS:  No fevers/chills. No TIA's or unusual headaches, no dysphagia. No prolonged cough. No dyspnea or chest pain on exertion.  No abdominal pain, change in bowel habits, black or bloody stools.  No urinary tract symptoms. On self exam, has noted no new or enlarging breast lumps, nipple discharge or breast pain. Gyn: No abnormal discharge or bleeding.       <OBJECTIVE>  /62 (BP Location: Left arm, Patient Position: Sitting, BP Cuff Size: Adult)   Pulse 62   Temp 36.4 °C (97.6 °F) (Temporal)   Resp 16   Ht 1.549 m (5' 1\")   Wt 49.1 kg (108 lb 3.2 oz)   LMP 08/21/2014   SpO2 99%   BMI 20.44 kg/m²   HEAD AND NECK:  Ears normal.  Throat, oral cavity and tongue normal.  Neck supple. Left side neck nodule, somewhat submandibular region.  No carotid bruits. No thyromegaly.  NEURO: Cranial nerves are normal. Neck supple. DTR's normal and symmetric.  CHEST:  Clear, good air entry, no wheezes, rhonchi or rales.  HEART:  S1 and S2 normal, no murmurs, clicks, gallops or rubs. Regular rate and rhythm.  No edema.  ABDOMEN:  Soft without tenderness, guarding, mass or organomegaly. No CVA tenderness or inguinal adenopathy.  EXTREMITIES:  Extremities, reflexes and " peripheral pulses are normal.  SKIN:  No rashes or suspicious skin lesions noted.  BREAST EXAM: Inspection negative. No nipple discharge or bleeding. No masses or nodularity palpable. No axillary DWAIN.   PELVIC EXAM: External genitalia and vagina normal. Normal appearing cervix. No abnormal discharge. No cervical motion tenderness. Bimanual exam without adnexal masses or tenderness to palpation.   Rectal Exam: normal sphincter tone, no masses.       ASSESSMENT/PLAN:    1. Well woman exam with routine gynecological exam          2. Screening for cervical cancer  THINPREP PAP WITH HPV      3. Castleman disease (HCC) -stable, continue routine follow up with Dr. Dallas.        4. Age-related osteoporosis without current pathological fracture   Recommend routine weight bearing exercise, adequate calcium and vitamin D intake. Continue to monitor.  alendronate (FOSAMAX) 70 MG Tab      5. Heterogeneously dense tissue of both breasts on mammography   Continue routine screening mammogram. May consider screening breast ultrasound as desired by patient.        6. Neck nodule   Follow up after imaging as needed or if no improvement. Monitor.  US-SOFT TISSUES OF HEAD - NECK      Recommend routine exercise and healthy diet.   Recommend routine labs and routine annual well visit.   Recommend routine screening as above.     Return in about 4 months (around 5/17/2025).      This medical record contains text that has been entered with the assistance of computer voice recognition and dictation software.  Therefore, it may contain unintended errors in text, spelling, punctuation, or grammar.

## 2025-01-23 LAB
HPV I/H RISK 1 DNA SPEC QL NAA+PROBE: NOT DETECTED
SPECIMEN SOURCE: NORMAL
THINPREP PAP, CYTOLOGY NL11781: NORMAL

## 2025-03-07 ENCOUNTER — HOSPITAL ENCOUNTER (OUTPATIENT)
Dept: RADIOLOGY | Facility: MEDICAL CENTER | Age: 65
End: 2025-03-07
Attending: FAMILY MEDICINE
Payer: COMMERCIAL

## 2025-03-07 DIAGNOSIS — R22.1 NECK NODULE: ICD-10-CM

## 2025-03-07 PROCEDURE — 76536 US EXAM OF HEAD AND NECK: CPT

## 2025-05-09 ENCOUNTER — OFFICE VISIT (OUTPATIENT)
Dept: MEDICAL GROUP | Facility: IMAGING CENTER | Age: 65
End: 2025-05-09
Payer: COMMERCIAL

## 2025-05-09 VITALS
HEART RATE: 83 BPM | BODY MASS INDEX: 20.65 KG/M2 | DIASTOLIC BLOOD PRESSURE: 50 MMHG | OXYGEN SATURATION: 97 % | SYSTOLIC BLOOD PRESSURE: 118 MMHG | HEIGHT: 61 IN | WEIGHT: 109.4 LBS | RESPIRATION RATE: 15 BRPM | TEMPERATURE: 97.2 F

## 2025-05-09 DIAGNOSIS — E55.9 VITAMIN D INSUFFICIENCY: ICD-10-CM

## 2025-05-09 DIAGNOSIS — I10 ESSENTIAL HYPERTENSION: ICD-10-CM

## 2025-05-09 DIAGNOSIS — R73.09 ELEVATED HEMOGLOBIN A1C: ICD-10-CM

## 2025-05-09 DIAGNOSIS — D47.Z2 CASTLEMAN DISEASE (HCC): ICD-10-CM

## 2025-05-09 DIAGNOSIS — E78.5 DYSLIPIDEMIA: ICD-10-CM

## 2025-05-09 DIAGNOSIS — R59.9 ENLARGED LYMPH NODES: ICD-10-CM

## 2025-05-09 DIAGNOSIS — R74.8 ALKALINE PHOSPHATASE ELEVATION: ICD-10-CM

## 2025-05-09 DIAGNOSIS — R22.1 NECK NODULE: ICD-10-CM

## 2025-05-09 DIAGNOSIS — G47.33 MODERATE OBSTRUCTIVE SLEEP APNEA: ICD-10-CM

## 2025-05-09 PROCEDURE — 3078F DIAST BP <80 MM HG: CPT | Performed by: FAMILY MEDICINE

## 2025-05-09 PROCEDURE — 3074F SYST BP LT 130 MM HG: CPT | Performed by: FAMILY MEDICINE

## 2025-05-09 PROCEDURE — 99214 OFFICE O/P EST MOD 30 MIN: CPT | Performed by: FAMILY MEDICINE

## 2025-05-09 RX ORDER — SIMVASTATIN 20 MG
20 TABLET ORAL EVERY EVENING
Qty: 90 TABLET | Refills: 3 | Status: SHIPPED | OUTPATIENT
Start: 2025-05-09

## 2025-05-09 ASSESSMENT — FIBROSIS 4 INDEX: FIB4 SCORE: 1.11

## 2025-05-09 NOTE — PROGRESS NOTES
SUBJECTIVE:    Chief Complaint   Patient presents with    Follow-Up    Medication Refill    Other     CPAP consult  , if she needs to keep rental because she is paying copay for $50 mos       HPI:     Martha Oliver is a 65 y.o. female with Castleman's diease diagnosed in 2015, followed by heme/onc- Dr. Dallas here for refill of medication and follow-up of multiple medical issues.    Castleman's disease.  Left-sided neck nodule, had ultrasound completed which noted enlarged lymph nodes likely associated with Castleman's disease.  She has reviewed with Dr. Dallas, hematology/oncology.  Continuing to monitor at this time, no other significant concerns.    Alkaline phosphatase-has had prior GI evaluation.    Dyslipidemia-stable, needs simvastatin 20 mg daily refilled.    Blood pressure has been stable, needs nifedipine 60 mg daily refilled.    Vitamin D insufficiency-Notes she is on vitamin D supplement.    Sleep apnea-moderate.  Wondering about continued use of CPAP.  After discussion patient will plan to continue CPAP.    ROS:  No recent fevers or chills. No nausea or vomiting. No diarrhea. No chest pains or shortness of breath. No lower extremity edema.    Current Outpatient Medications on File Prior to Visit   Medication Sig Dispense Refill    alendronate (FOSAMAX) 70 MG Tab Take 1 Tablet by mouth every 7 days. 12 Tablet 1    NIFEdipine (ADALAT CC) 60 MG CR tablet Take 1 Tablet by mouth every day. 90 Tablet 3    simvastatin (ZOCOR) 20 MG Tab Take 1 Tablet by mouth every evening. 90 Tablet 3    Ascorbic Acid (VITAMIN C PO) Take  by mouth.      Omega-3 Fatty Acids (FISH OIL) 1000 MG CAPS capsule Take 1,000 mg by mouth 3 times a day, with meals.      vitamin D (CHOLECALCIFEROL) 1000 UNIT TABS Take 2,000 Units by mouth every day.      azelastine (ASTELIN) 137 MCG/SPRAY nasal spray Administer 1 Spray into affected nostril(S) 2 times a day. (Patient not taking: Reported on 1/17/2025) 30 mL 1    benzonatate (TESSALON)  "100 MG Cap Take 1 Capsule by mouth 3 times a day as needed for Cough. (Patient not taking: Reported on 1/17/2025) 60 Capsule 0    promethazine-dextromethorphan (PROMETHAZINE-DM) 6.25-15 MG/5ML syrup Take 5 mL by mouth at bedtime as needed for Cough (MR x 1 in 4 hours). (Patient not taking: Reported on 9/6/2024) 120 mL 0     No current facility-administered medications on file prior to visit.       No Known Allergies    Patient Active Problem List    Diagnosis Date Noted    Prediabetes 02/07/2023    Alkaline phosphatase elevation 02/07/2023    High globulin level 02/07/2023    Neck nodule 02/07/2023    Essential hypertension 10/21/2015    Ataxia 10/21/2015    Castleman disease (HCC) 02/12/2015    Enlarged lymph nodes 12/02/2014    Dysarthria     Impaired fasting blood sugar 07/17/2014    HTN (hypertension)     Dyslipidemia        Past Medical History:   Diagnosis Date    Ataxia     no neurologic etiology found    Dysarthria     no neurologic etiology found    Dyslipidemia     HTN (hypertension)     Hypertension        OBJECTIVE:   /50 (BP Location: Left arm, Patient Position: Sitting, BP Cuff Size: Adult)   Pulse 83   Temp 36.2 °C (97.2 °F) (Temporal)   Resp 15   Ht 1.549 m (5' 1\")   Wt 49.6 kg (109 lb 6.4 oz)   LMP 08/21/2014   SpO2 97%   BMI 20.67 kg/m²   General: Well-developed well-nourished female, no acute distress  Neck: supple, left cervical neck nodules and fullness noted without significant tenderness to palpation.  No thyromegaly  Cardiovascular: regular rate and rhythm, no murmurs, gallops, rubs  Lungs: clear to auscultation bilaterally, no wheezes, crackles, or rhonchi  Abdomen: +bowel sounds, soft, nontender, nondistended, no rebound, no guarding, no hepatosplenomegaly  Extremities: no cyanosis, clubbing, edema  Skin: Warm and dry  Psych: appropriate mood and affect    Left side rpimeient.       Details    Reading Physician Reading Date Result Priority   Tomy Donnelly, " M.D.  [phone]     3/7/2025      Narrative & Impression     3/7/2025 12:46 PM     HISTORY/REASON FOR EXAM:  Mass/Lump; left neck nodule, area of clinical concern in the submandibular region        TECHNIQUE/EXAM DESCRIPTION:  Ultrasound of the soft tissues of the head and neck.     COMPARISON:  9/3/2024     FINDINGS:  Focused ultrasound of the area of concern in the left submandibular neck demonstrates an enlarged lymph nodes measuring 2.6 x 1.7 x 2.5 cm.     Another enlarged lymph node is seen in the left submandibular region. It measures 4.5 x 1.4 x 3.1 cm.     Prominent right cervical lymph node is not enlarged by size criteria, measuring 8 mm short axis. Right mandibular lymph nodes are also not enlarged.     No mass lesions or drainable fluid collections detected.        IMPRESSION:     Enlarged left cervical and submandibular lymph nodes. Findings are likely related to patient's Castleman disease, although pernell metastases are also in the differential.        Exam Ended: 03/07/25  1:10 PM Last Resulted: 03/07/25  2:06 PM       ASSESSMENT/PLAN:    65 y.o.female       1. Neck nodule-neck ultrasound evaluation enlarged lymph nodes of left cervical region likely associated with Castleman's disease.  Patient's hematologist/oncologist is aware.  She will plan for routine follow-up with Dr. Dallas.       2. Enlarged lymph nodes-see #1.         3. Castleman disease (HCC)-appears associated enlarged lymph nodes of left cervical region.  Continue routine follow-up with hematology/oncology.       4. Alkaline phosphatase elevation   Continue to monitor CMP      5. Dyslipidemia-stable.  Continue current medication.  Continue healthy diet and routine exercise.  Continue monitor labs. simvastatin (ZOCOR) 20 MG Tab    Comp Metabolic Panel    Lipid Profile    TSH WITH REFLEX TO FT4      6. Essential hypertension-controlled.  Continue current medication.  Continue monitor. NIFEdipine (ADALAT CC) 60 MG CR tablet    CBC WITH  DIFFERENTIAL      7. Elevated hemoglobin A1c  Monitor labs.  Continue healthy diet and routine exercise as tolerated. Comp Metabolic Panel    HEMOGLOBIN A1C      8. Vitamin D insufficiency-stable.  Continue to monitor.  She will continue on supplement therapy. VITAMIN D,25 HYDROXY (DEFICIENCY)      9. Moderate obstructive sleep apnea   Reviewed options of therapy for oral device, inspire or other ENT procedures.  Patient will continue with use of CPAP at this time.           Return in about 3 months (around 8/9/2025).  Follow-up sooner as needed.    This medical record contains text that has been entered with the assistance of computer voice recognition and dictation software.  Therefore, it may contain unintended errors in text, spelling, punctuation, or grammar.

## 2025-08-09 ENCOUNTER — HOSPITAL ENCOUNTER (OUTPATIENT)
Dept: LAB | Facility: MEDICAL CENTER | Age: 65
End: 2025-08-09
Attending: FAMILY MEDICINE
Payer: COMMERCIAL

## 2025-08-09 DIAGNOSIS — E78.5 DYSLIPIDEMIA: ICD-10-CM

## 2025-08-09 DIAGNOSIS — I10 ESSENTIAL HYPERTENSION: ICD-10-CM

## 2025-08-09 DIAGNOSIS — R73.09 ELEVATED HEMOGLOBIN A1C: ICD-10-CM

## 2025-08-09 DIAGNOSIS — R74.8 ALKALINE PHOSPHATASE ELEVATION: ICD-10-CM

## 2025-08-09 DIAGNOSIS — E55.9 VITAMIN D INSUFFICIENCY: ICD-10-CM

## 2025-08-09 LAB
25(OH)D3 SERPL-MCNC: 66 NG/ML (ref 30–100)
ALBUMIN SERPL BCP-MCNC: 4.4 G/DL (ref 3.2–4.9)
ALBUMIN/GLOB SERPL: 1.1 G/DL
ALP SERPL-CCNC: 101 U/L (ref 30–99)
ALT SERPL-CCNC: 28 U/L (ref 2–50)
ANION GAP SERPL CALC-SCNC: 11 MMOL/L (ref 7–16)
AST SERPL-CCNC: 34 U/L (ref 12–45)
BASOPHILS # BLD AUTO: 1.3 % (ref 0–1.8)
BASOPHILS # BLD: 0.06 K/UL (ref 0–0.12)
BILIRUB SERPL-MCNC: 0.6 MG/DL (ref 0.1–1.5)
BUN SERPL-MCNC: 15 MG/DL (ref 8–22)
CALCIUM ALBUM COR SERPL-MCNC: 9.1 MG/DL (ref 8.5–10.5)
CALCIUM SERPL-MCNC: 9.4 MG/DL (ref 8.5–10.5)
CHLORIDE SERPL-SCNC: 104 MMOL/L (ref 96–112)
CHOLEST SERPL-MCNC: 129 MG/DL (ref 100–199)
CO2 SERPL-SCNC: 24 MMOL/L (ref 20–33)
CREAT SERPL-MCNC: 0.63 MG/DL (ref 0.5–1.4)
EOSINOPHIL # BLD AUTO: 0.18 K/UL (ref 0–0.51)
EOSINOPHIL NFR BLD: 4 % (ref 0–6.9)
ERYTHROCYTE [DISTWIDTH] IN BLOOD BY AUTOMATED COUNT: 42 FL (ref 35.9–50)
EST. AVERAGE GLUCOSE BLD GHB EST-MCNC: 120 MG/DL
GFR SERPLBLD CREATININE-BSD FMLA CKD-EPI: 99 ML/MIN/1.73 M 2
GLOBULIN SER CALC-MCNC: 4 G/DL (ref 1.9–3.5)
GLUCOSE SERPL-MCNC: 109 MG/DL (ref 65–99)
HBA1C MFR BLD: 5.8 % (ref 4–5.6)
HCT VFR BLD AUTO: 45 % (ref 37–47)
HDLC SERPL-MCNC: 36 MG/DL
HGB BLD-MCNC: 15 G/DL (ref 12–16)
IMM GRANULOCYTES # BLD AUTO: 0.01 K/UL (ref 0–0.11)
IMM GRANULOCYTES NFR BLD AUTO: 0.2 % (ref 0–0.9)
LDLC SERPL CALC-MCNC: 63 MG/DL
LYMPHOCYTES # BLD AUTO: 1.37 K/UL (ref 1–4.8)
LYMPHOCYTES NFR BLD: 30.3 % (ref 22–41)
MCH RBC QN AUTO: 29.7 PG (ref 27–33)
MCHC RBC AUTO-ENTMCNC: 33.3 G/DL (ref 32.2–35.5)
MCV RBC AUTO: 89.1 FL (ref 81.4–97.8)
MONOCYTES # BLD AUTO: 0.6 K/UL (ref 0–0.85)
MONOCYTES NFR BLD AUTO: 13.3 % (ref 0–13.4)
NEUTROPHILS # BLD AUTO: 2.3 K/UL (ref 1.82–7.42)
NEUTROPHILS NFR BLD: 50.9 % (ref 44–72)
NRBC # BLD AUTO: 0 K/UL
NRBC BLD-RTO: 0 /100 WBC (ref 0–0.2)
PLATELET # BLD AUTO: 312 K/UL (ref 164–446)
PMV BLD AUTO: 9.9 FL (ref 9–12.9)
POTASSIUM SERPL-SCNC: 4.1 MMOL/L (ref 3.6–5.5)
PROT SERPL-MCNC: 8.4 G/DL (ref 6–8.2)
RBC # BLD AUTO: 5.05 M/UL (ref 4.2–5.4)
SODIUM SERPL-SCNC: 139 MMOL/L (ref 135–145)
TRIGL SERPL-MCNC: 152 MG/DL (ref 0–149)
TSH SERPL DL<=0.005 MIU/L-ACNC: 1.95 UIU/ML (ref 0.38–5.33)
WBC # BLD AUTO: 4.5 K/UL (ref 4.8–10.8)

## 2025-08-09 PROCEDURE — 84443 ASSAY THYROID STIM HORMONE: CPT

## 2025-08-09 PROCEDURE — 36415 COLL VENOUS BLD VENIPUNCTURE: CPT

## 2025-08-09 PROCEDURE — 80061 LIPID PANEL: CPT

## 2025-08-09 PROCEDURE — 82306 VITAMIN D 25 HYDROXY: CPT

## 2025-08-09 PROCEDURE — 85025 COMPLETE CBC W/AUTO DIFF WBC: CPT

## 2025-08-09 PROCEDURE — 83036 HEMOGLOBIN GLYCOSYLATED A1C: CPT

## 2025-08-09 PROCEDURE — 80053 COMPREHEN METABOLIC PANEL: CPT

## 2025-08-22 ENCOUNTER — OFFICE VISIT (OUTPATIENT)
Dept: MEDICAL GROUP | Facility: IMAGING CENTER | Age: 65
End: 2025-08-22
Payer: COMMERCIAL

## 2025-08-22 VITALS
HEIGHT: 61 IN | DIASTOLIC BLOOD PRESSURE: 62 MMHG | RESPIRATION RATE: 14 BRPM | HEART RATE: 77 BPM | SYSTOLIC BLOOD PRESSURE: 120 MMHG | BODY MASS INDEX: 22.28 KG/M2 | OXYGEN SATURATION: 95 % | WEIGHT: 118 LBS | TEMPERATURE: 97.7 F

## 2025-08-22 DIAGNOSIS — R73.09 ELEVATED HEMOGLOBIN A1C: ICD-10-CM

## 2025-08-22 DIAGNOSIS — E55.9 VITAMIN D INSUFFICIENCY: ICD-10-CM

## 2025-08-22 DIAGNOSIS — I10 ESSENTIAL HYPERTENSION: ICD-10-CM

## 2025-08-22 DIAGNOSIS — D72.819 LEUKOPENIA, UNSPECIFIED TYPE: ICD-10-CM

## 2025-08-22 DIAGNOSIS — M81.0 AGE-RELATED OSTEOPOROSIS WITHOUT CURRENT PATHOLOGICAL FRACTURE: ICD-10-CM

## 2025-08-22 DIAGNOSIS — R22.1 NECK FULLNESS: ICD-10-CM

## 2025-08-22 DIAGNOSIS — D47.Z2 CASTLEMAN DISEASE (HCC): ICD-10-CM

## 2025-08-22 DIAGNOSIS — R59.9 ENLARGED LYMPH NODES: ICD-10-CM

## 2025-08-22 DIAGNOSIS — R74.8 ALKALINE PHOSPHATASE ELEVATION: ICD-10-CM

## 2025-08-22 DIAGNOSIS — R77.9 ELEVATED BLOOD PROTEIN: ICD-10-CM

## 2025-08-22 DIAGNOSIS — E78.5 DYSLIPIDEMIA: ICD-10-CM

## 2025-08-22 PROCEDURE — 99214 OFFICE O/P EST MOD 30 MIN: CPT | Performed by: FAMILY MEDICINE

## 2025-08-22 PROCEDURE — 3078F DIAST BP <80 MM HG: CPT | Performed by: FAMILY MEDICINE

## 2025-08-22 PROCEDURE — 3074F SYST BP LT 130 MM HG: CPT | Performed by: FAMILY MEDICINE

## 2025-08-22 ASSESSMENT — LIFESTYLE VARIABLES
HOW MANY STANDARD DRINKS CONTAINING ALCOHOL DO YOU HAVE ON A TYPICAL DAY: PATIENT DOES NOT DRINK
SKIP TO QUESTIONS 9-10: 1
HOW OFTEN DO YOU HAVE SIX OR MORE DRINKS ON ONE OCCASION: NEVER
AUDIT-C TOTAL SCORE: 0
HOW OFTEN DO YOU HAVE A DRINK CONTAINING ALCOHOL: NEVER

## 2025-08-22 ASSESSMENT — FIBROSIS 4 INDEX: FIB4 SCORE: 1.3
